# Patient Record
Sex: MALE | Race: WHITE | NOT HISPANIC OR LATINO | Employment: UNEMPLOYED | URBAN - METROPOLITAN AREA
[De-identification: names, ages, dates, MRNs, and addresses within clinical notes are randomized per-mention and may not be internally consistent; named-entity substitution may affect disease eponyms.]

---

## 2018-11-06 ENCOUNTER — APPOINTMENT (EMERGENCY)
Dept: RADIOLOGY | Facility: HOSPITAL | Age: 58
End: 2018-11-06
Payer: COMMERCIAL

## 2018-11-06 ENCOUNTER — HOSPITAL ENCOUNTER (EMERGENCY)
Facility: HOSPITAL | Age: 58
Discharge: HOME | End: 2018-11-06
Attending: EMERGENCY MEDICINE | Admitting: EMERGENCY MEDICINE
Payer: COMMERCIAL

## 2018-11-06 VITALS
HEIGHT: 70 IN | RESPIRATION RATE: 18 BRPM | BODY MASS INDEX: 30.06 KG/M2 | DIASTOLIC BLOOD PRESSURE: 90 MMHG | WEIGHT: 210 LBS | TEMPERATURE: 98.5 F | HEART RATE: 81 BPM | SYSTOLIC BLOOD PRESSURE: 145 MMHG | OXYGEN SATURATION: 98 %

## 2018-11-06 DIAGNOSIS — M10.9 GOUTY ARTHRITIS OF GREAT TOE: Primary | ICD-10-CM

## 2018-11-06 PROCEDURE — 99283 EMERGENCY DEPT VISIT LOW MDM: CPT

## 2018-11-06 PROCEDURE — 73630 X-RAY EXAM OF FOOT: CPT

## 2018-11-06 RX ORDER — IBUPROFEN 600 MG/1
600 TABLET ORAL EVERY 6 HOURS PRN
Qty: 30 TABLET | Refills: 0 | Status: SHIPPED | OUTPATIENT
Start: 2018-11-06 | End: 2018-11-30 | Stop reason: SDUPTHER

## 2018-11-06 RX ADMIN — IBUPROFEN 600 MG: 400 TABLET ORAL at 13:55

## 2018-11-06 NOTE — ED PROVIDER NOTES
History  Chief Complaint   Patient presents with    Foot Pain     According to the patient, his left foot began start swelling and began to become painful in last week  Patient is a 73-year-old male who says he has no significant past medical history says for the past several days the pain at his left 1st MTP  He says it hurts when he walks  He says that his left foot is not swollen  He has had no fevers chills  Patient at this time the pain becoming better but it has not resolved completely and now he is having some mild discomfort in the left ankle so presents emergency department  Patient thinks that he may have gout  None       Past Medical History:   Diagnosis Date    Osteoarthritis        History reviewed  No pertinent surgical history  History reviewed  No pertinent family history  I have reviewed and agree with the history as documented  Social History   Substance Use Topics    Smoking status: Current Every Day Smoker     Packs/day: 3 00     Types: Cigarettes    Smokeless tobacco: Never Used    Alcohol use Yes      Comment: occasionally        Review of Systems   Constitutional: Negative for chills, fatigue, fever and unexpected weight change  HENT: Negative for congestion and nosebleeds  Eyes: Negative for visual disturbance  Respiratory: Negative for chest tightness and shortness of breath  Cardiovascular: Negative for chest pain, palpitations and leg swelling  Gastrointestinal: Negative for abdominal pain, blood in stool, diarrhea, nausea and vomiting  Endocrine: Negative for cold intolerance and heat intolerance  Genitourinary: Negative for difficulty urinating  Musculoskeletal: Negative for arthralgias, back pain, gait problem, joint swelling and myalgias  Skin: Negative for rash  Neurological: Negative for dizziness, speech difficulty, weakness and headaches     Psychiatric/Behavioral: Negative for behavioral problems, confusion, self-injury and suicidal ideas  All other systems reviewed and are negative  Physical Exam  Physical Exam   Constitutional: He is oriented to person, place, and time  He appears well-developed and well-nourished  HENT:   Head: Normocephalic and atraumatic  Nose: Nose normal    Eyes: Pupils are equal, round, and reactive to light  EOM are normal    Neck: Normal range of motion  Neck supple  Cardiovascular: Normal rate, regular rhythm and normal heart sounds  Exam reveals no gallop and no friction rub  No murmur heard  Pulmonary/Chest: Effort normal and breath sounds normal  No respiratory distress  He has no wheezes  He has no rales  Abdominal: Soft  He exhibits no distension  There is no tenderness  There is no rebound and no guarding  Musculoskeletal: Normal range of motion  He exhibits tenderness (Left 1st MTP  It is mildly warm  There is mild erythema)  He exhibits no edema  Neurological: He is alert and oriented to person, place, and time  Skin: Skin is warm and dry  Psychiatric: He has a normal mood and affect  His behavior is normal  Judgment and thought content normal    Nursing note and vitals reviewed  Vital Signs  ED Triage Vitals [11/06/18 1222]   Temperature Pulse Respirations Blood Pressure SpO2   99 4 °F (37 4 °C) 94 18 (!) 178/98 99 %      Temp Source Heart Rate Source Patient Position - Orthostatic VS BP Location FiO2 (%)   Tympanic Monitor Lying Left arm --      Pain Score       8           Vitals:    11/06/18 1222 11/06/18 1349   BP: (!) 178/98 145/90   Pulse: 94 81   Patient Position - Orthostatic VS: Lying Lying       Visual Acuity      ED Medications  Medications   ibuprofen (MOTRIN) tablet 600 mg (not administered)       Diagnostic Studies  Results Reviewed     None                 XR foot 3+ views LEFT   Final Result by Elvira Vang (11/06 1329)    Mild degenerative changes are seen in the first metatarsophalangeal   joint  No acute fracture or subluxation is seen  Signed by Drew Quintero MD                 Procedures  Procedures       Phone Contacts  ED Phone Contact    ED Course  ED Course as of Nov 06 1356   Tue Nov 06, 2018   1339 Patient likely has gout  This exacerbation is at the tail end  Will discharge on a nonsteroidal and follow up with his PMD    1340 XR foot 3+ views LEFT                               [facility]  CritCare Time    Disposition  Final diagnoses:   Gouty arthritis of great toe     Time reflects when diagnosis was documented in both MDM as applicable and the Disposition within this note     Time User Action Codes Description Comment    11/6/2018  1:40 PM Mary Ann Isidra Add [M10 9] Gouty arthritis of great toe       ED Disposition     ED Disposition Condition Comment    Discharge  [address] discharge to home/self care  Condition at discharge: Good        Follow-up Information    None         Patient's Medications   Discharge Prescriptions    IBUPROFEN (MOTRIN) 600 MG TABLET    Take 1 tablet (600 mg total) by mouth every 6 (six) hours as needed for moderate pain       Start Date: 11/6/2018 End Date: --       Order Dose: 600 mg       Quantity: 30 tablet    Refills: 0     No discharge procedures on file      ED Provider  Electronically Signed by           Ihsan Hutchinson MD  11/06/18 3283

## 2018-11-06 NOTE — DISCHARGE INSTRUCTIONS
Gout   WHAT YOU NEED TO KNOW:   Gout is a form of arthritis that causes severe joint pain, redness, swelling, and stiffness  Acute gout pain starts suddenly, gets worse quickly, and stops on its own  Acute gout can become chronic and cause permanent damage to the joints  DISCHARGE INSTRUCTIONS:   Return to the emergency department if:   · You have severe pain in one or more of your joints that you cannot tolerate  · You have a fever or redness that spreads beyond the joint area  Contact your healthcare provider if:   · You have new symptoms, such as a rash, after you start gout treatment  · Your joint pain and swelling do not go away, even after treatment  · You are not urinating as much or as often as you usually do  · You have trouble taking your gout medicines  · You have questions or concerns about your condition or care  Medicines: You may need any of the following:  · Prescription pain medicine  may be given  Ask your healthcare provider how to take this medicine safely  Some prescription pain medicines contain acetaminophen  Do not take other medicines that contain acetaminophen without talking to your healthcare provider  Too much acetaminophen may cause liver damage  Prescription pain medicine may cause constipation  Ask your healthcare provider how to prevent or treat constipation  · NSAIDs , such as ibuprofen, help decrease swelling, pain, and fever  This medicine is available with or without a doctor's order  NSAIDs can cause stomach bleeding or kidney problems in certain people  If you take blood thinner medicine, always ask your healthcare provider if NSAIDs are safe for you  Always read the medicine label and follow directions  · Gout medicine  decreases joint pain and swelling  It may also be given to prevent new gout attacks  · Steroids  reduce inflammation and can help your joint stiffness and pain during gout attacks      · Uric acid medicine  may be given to reduce the amount of uric acid your body makes  Some medicines may help you pass more uric acid when you urinate  · Take your medicine as directed  Contact your healthcare provider if you think your medicine is not helping or if you have side effects  Tell him or her if you are allergic to any medicine  Keep a list of the medicines, vitamins, and herbs you take  Include the amounts, and when and why you take them  Bring the list or the pill bottles to follow-up visits  Carry your medicine list with you in case of an emergency  Follow up with your healthcare provider as directed:  Write down your questions so you remember to ask them during your visits  Manage gout:   · Rest your painful joint so it can heal   Your healthcare provider may recommend crutches or a walker if the affected joint is in a leg  · Apply ice to your joint  Ice decreases pain and swelling  Use an ice pack, or put crushed ice in a plastic bag  Cover the ice pack or bag with a towel before you apply it to your painful joint  Apply ice for 15 to 20 minutes every hour, or as directed  · Elevate your joint  Elevation helps reduce swelling and pain  Raise your joint above the level of your heart as often as you can  Prop your painful joint on pillows to keep it above your heart comfortably  · Go to physical therapy if directed  A physical therapist can teach you exercises to improve flexibility and range of motion  Prevent gout attacks:   · Do not eat high-purine foods  These foods include meats, seafood, asparagus, spinach, cauliflower, and some types of beans  Healthcare providers may tell you to eat more low-fat milk products, such as yogurt  Milk products may decrease your risk for gout attacks  Vitamin C and coffee may also help  Your healthcare provider or dietitian can help you create a meal plan  · Drink more liquids as directed  Liquids such as water help remove uric acid from your body   Ask how much liquid to drink each day and which liquids are best for you  · Manage your weight  Weight loss may decrease the amount of uric acid in your body  Exercise can help you lose weight  Talk to your healthcare provider about the best exercises for you  · Control your blood sugar level if you have diabetes  Keep your blood sugar level in a normal range  This can help prevent gout attacks  · Limit or do not drink alcohol as directed  Alcohol can trigger a gout attack  Alcohol also increases your risk for dehydration  Ask your healthcare provider if alcohol is safe for you  © 2017 [facility] [facility] Information is for End User's use only and may not be sold, redistributed or otherwise used for commercial purposes  All illustrations and images included in CareNotes® are the copyrighted property of A D A M [employer] Inc  or Gerry Estrada  The above information is an  only  It is not intended as medical advice for individual conditions or treatments  Talk to your doctor, nurse or pharmacist before following any medical regimen to see if it is safe and effective for you

## 2018-11-08 ENCOUNTER — OFFICE VISIT (OUTPATIENT)
Dept: FAMILY MEDICINE CLINIC | Facility: CLINIC | Age: 58
End: 2018-11-08
Payer: COMMERCIAL

## 2018-11-08 ENCOUNTER — DOCUMENTATION (OUTPATIENT)
Dept: FAMILY MEDICINE CLINIC | Facility: CLINIC | Age: 58
End: 2018-11-08

## 2018-11-08 VITALS
WEIGHT: 222 LBS | TEMPERATURE: 99.1 F | BODY MASS INDEX: 32.88 KG/M2 | DIASTOLIC BLOOD PRESSURE: 82 MMHG | HEART RATE: 97 BPM | SYSTOLIC BLOOD PRESSURE: 142 MMHG | OXYGEN SATURATION: 98 % | RESPIRATION RATE: 18 BRPM | HEIGHT: 69 IN

## 2018-11-08 DIAGNOSIS — Z00.00 ANNUAL PHYSICAL EXAM: ICD-10-CM

## 2018-11-08 DIAGNOSIS — Z13.220 SCREENING CHOLESTEROL LEVEL: ICD-10-CM

## 2018-11-08 DIAGNOSIS — M10.9 ACUTE GOUT INVOLVING TOE OF LEFT FOOT, UNSPECIFIED CAUSE: Primary | ICD-10-CM

## 2018-11-08 DIAGNOSIS — Z12.5 SCREENING PSA (PROSTATE SPECIFIC ANTIGEN): ICD-10-CM

## 2018-11-08 PROCEDURE — 99203 OFFICE O/P NEW LOW 30 MIN: CPT | Performed by: NURSE PRACTITIONER

## 2018-11-08 PROCEDURE — 99386 PREV VISIT NEW AGE 40-64: CPT | Performed by: NURSE PRACTITIONER

## 2018-11-08 PROCEDURE — 3008F BODY MASS INDEX DOCD: CPT | Performed by: NURSE PRACTITIONER

## 2018-11-08 RX ORDER — PREDNISONE 20 MG/1
20 TABLET ORAL 2 TIMES DAILY WITH MEALS
Qty: 10 TABLET | Refills: 0 | Status: SHIPPED | OUTPATIENT
Start: 2018-11-08 | End: 2018-11-13

## 2018-11-08 RX ORDER — IBUPROFEN 600 MG/1
600 TABLET ORAL EVERY 6 HOURS PRN
Qty: 90 TABLET | Refills: 1 | Status: SHIPPED | OUTPATIENT
Start: 2018-11-08 | End: 2019-05-07 | Stop reason: SDUPTHER

## 2018-11-08 NOTE — PROGRESS NOTES
HPI:  Ron Skaggs is a 63 y o  male here for his yearly health maintenance exam    There is no problem list on file for this patient  Past Medical History:   Diagnosis Date    Gout     Osteoarthritis                 Current Outpatient Prescriptions   Medication Sig Dispense Refill    ibuprofen (MOTRIN) 600 mg tablet Take 1 tablet (600 mg total) by mouth every 6 (six) hours as needed for moderate pain 30 tablet 0    ibuprofen (MOTRIN) 600 mg tablet Take 1 tablet (600 mg total) by mouth every 6 (six) hours as needed for mild pain or moderate pain 90 tablet 1    predniSONE 20 mg tablet Take 1 tablet (20 mg total) by mouth 2 (two) times a day with meals for 5 days 10 tablet 0     No current facility-administered medications for this visit  No Known Allergies    There is no immunization history on file for this patient  Patient Care Team:  Griffin Canavan as PCP - General (Family Medicine)    Review of Systems   Constitutional: Negative for activity change, diaphoresis, fatigue and fever  HENT: Negative for congestion, facial swelling, hearing loss, rhinorrhea, sinus pain, sinus pressure, sneezing, sore throat and voice change  Eyes: Negative for discharge and visual disturbance  Respiratory: Negative for cough, choking, chest tightness, shortness of breath, wheezing and stridor  Cardiovascular: Negative for chest pain, palpitations and leg swelling  Gastrointestinal: Negative for abdominal distention, abdominal pain, constipation, diarrhea, nausea and vomiting  Endocrine: Negative for polydipsia, polyphagia and polyuria  Genitourinary: Negative for difficulty urinating, dysuria, frequency and urgency  Musculoskeletal: Positive for arthralgias (see acute note- gout left great toe), gait problem and joint swelling  Negative for back pain, myalgias, neck pain and neck stiffness  Skin: Negative for color change, rash and wound     Neurological: Negative for dizziness, syncope, speech difficulty, weakness, light-headedness and headaches  Hematological: Negative for adenopathy  Does not bruise/bleed easily  Psychiatric/Behavioral: Negative for agitation, behavioral problems, confusion, hallucinations, sleep disturbance and suicidal ideas  The patient is not nervous/anxious  Physical Exam :  Physical Exam   Constitutional: He is oriented to person, place, and time  He appears well-developed and well-nourished  No distress  HENT:   Head: Normocephalic and atraumatic  Right Ear: Tympanic membrane, external ear and ear canal normal    Left Ear: Tympanic membrane, external ear and ear canal normal    Nose: Nose normal  Right sinus exhibits no maxillary sinus tenderness and no frontal sinus tenderness  Left sinus exhibits no maxillary sinus tenderness and no frontal sinus tenderness  Mouth/Throat: Uvula is midline, oropharynx is clear and moist and mucous membranes are normal  No oropharyngeal exudate  Eyes: Pupils are equal, round, and reactive to light  Conjunctivae and EOM are normal  Right eye exhibits no discharge  Left eye exhibits no discharge  Cardiovascular: Normal rate, regular rhythm and normal heart sounds  Exam reveals no gallop and no friction rub  No murmur heard  Pulmonary/Chest: Effort normal  No respiratory distress  He has wheezes (throughout)  He has no rales  Abdominal: Soft  Bowel sounds are normal  He exhibits distension (round, large abd)  Musculoskeletal: Normal range of motion  He exhibits tenderness (left foot/great toe- see acute note)  He exhibits no edema or deformity  Neurological: He is alert and oriented to person, place, and time  No cranial nerve deficit  Coordination normal    Skin: Skin is warm, dry and intact  No rash noted  He is not diaphoretic  No erythema  Psychiatric: He has a normal mood and affect   His speech is normal and behavior is normal  Judgment and thought content normal  Cognition and memory are normal    Nursing note and vitals reviewed  Reviewed Updated [facility] Prior Wellness Visits:   Last Health Maintenance visit information was reviewed, patient interviewed , no change since last HM visit yes  Last HM visit information was reviewed, patient interviewed and updates made to the record today yes    Assessment and Plan:  1  Annual physical exam     2  Acute gout involving toe of left foot, unspecified cause  predniSONE 20 mg tablet    ibuprofen (MOTRIN) 600 mg tablet    Comprehensive metabolic panel    Uric acid   3  Screening cholesterol level  Lipid panel   4  Screening PSA (prostate specific antigen)  PSA Total, Diagnostic       There are no preventive care reminders to display for this patient

## 2018-11-08 NOTE — PROGRESS NOTES
[address] [facility]        NAME: Mini Ferguson is a 63 y o  male  : 1960    MRN: [id number]  DATE: 2018  TIME: 10:45 AM    Assessment and Plan   Acute gout involving toe of left foot, unspecified cause [M10 9]  1  Acute gout involving toe of left foot, unspecified cause  predniSONE 20 mg tablet    ibuprofen (MOTRIN) 600 mg tablet    Comprehensive metabolic panel    Uric acid   2  Screening cholesterol level  Lipid panel   3  Annual physical exam     4  Screening PSA (prostate specific antigen)  PSA Total, Diagnostic         Patient Instructions     Patient Instructions   Start Prednisone as directed, continue Ibuprofen  Get labs drawn in 3 weeks from now- after 18- fast for 8-12 hours  If any problems, call or return           Chief Complaint     Chief Complaint   Patient presents with    Establish Care     Follow up ER Gout Right foot great toe         History of Present Illness       Patient is here today as a f/u from ED for gout attack  Has had this multiple times over the past few years  Has not seen a doctor in "years"  Is a heavy smoker and admits he hates coming to the doctor  Pt  Does agree to go for routine screening labs if he only has to go once  Review of Systems   Review of Systems   Constitutional: Negative for activity change, diaphoresis, fatigue and fever  HENT: Negative for congestion, facial swelling, hearing loss, rhinorrhea, sinus pain, sinus pressure, sneezing, sore throat and voice change  Eyes: Negative for discharge and visual disturbance  Respiratory: Negative for cough, choking, chest tightness, shortness of breath, wheezing and stridor  Cardiovascular: Negative for chest pain, palpitations and leg swelling  Gastrointestinal: Negative for abdominal distention, abdominal pain, constipation, diarrhea, nausea and vomiting  Endocrine: Negative for polydipsia, polyphagia and polyuria     Genitourinary: Negative for difficulty urinating, dysuria, frequency and urgency  Musculoskeletal: Positive for arthralgias (left ankle/foot/great toe), gait problem and joint swelling  Negative for back pain, myalgias, neck pain and neck stiffness  Skin: Negative for color change, rash and wound  Neurological: Negative for dizziness, syncope, speech difficulty, weakness, light-headedness and headaches  Hematological: Negative for adenopathy  Does not bruise/bleed easily  Psychiatric/Behavioral: Negative for agitation, behavioral problems, confusion, hallucinations, sleep disturbance and suicidal ideas  The patient is not nervous/anxious  Current Medications       Current Outpatient Prescriptions:     ibuprofen (MOTRIN) 600 mg tablet, Take 1 tablet (600 mg total) by mouth every 6 (six) hours as needed for moderate pain, Disp: 30 tablet, Rfl: 0    ibuprofen (MOTRIN) 600 mg tablet, Take 1 tablet (600 mg total) by mouth every 6 (six) hours as needed for mild pain or moderate pain, Disp: 90 tablet, Rfl: 1    predniSONE 20 mg tablet, Take 1 tablet (20 mg total) by mouth 2 (two) times a day with meals for 5 days, Disp: 10 tablet, Rfl: 0    Current Allergies     Allergies as of 11/08/2018    (No Known Allergies)            The following portions of the patient's history were reviewed and updated as appropriate: allergies, current medications, past family history, past medical history, past social history, past surgical history and problem list      Past Medical History:   Diagnosis Date    Gout     Osteoarthritis        History reviewed  No pertinent surgical history  Family History   Problem Relation Age of Onset    Arthritis Mother     Coronary artery disease Father          Medications have been verified          Objective   /82   Pulse 97   Temp 99 1 °F (37 3 °C) (Tympanic)   Resp 18   Ht 5' 9" (1 753 m)   Wt 101 kg (222 lb)   SpO2 98%   BMI 32 78 kg/m²        Physical Exam     Physical Exam   Constitutional: He is oriented to person, place, and time  He appears well-developed and well-nourished  No distress  HENT:   Head: Normocephalic and atraumatic  Right Ear: Tympanic membrane, external ear and ear canal normal    Left Ear: Tympanic membrane, external ear and ear canal normal    Nose: Nose normal  Right sinus exhibits no maxillary sinus tenderness and no frontal sinus tenderness  Left sinus exhibits no maxillary sinus tenderness and no frontal sinus tenderness  Mouth/Throat: Uvula is midline, oropharynx is clear and moist and mucous membranes are normal  No oropharyngeal exudate  Eyes: Pupils are equal, round, and reactive to light  Conjunctivae and EOM are normal  Right eye exhibits no discharge  Left eye exhibits no discharge  Cardiovascular: Normal rate, regular rhythm and normal heart sounds  Exam reveals no gallop and no friction rub  No murmur heard  Pulmonary/Chest: Effort normal  No respiratory distress  He has wheezes (throughout)  He has no rales  Abdominal: He exhibits distension (large round abdomen)  Musculoskeletal: Normal range of motion  He exhibits tenderness  He exhibits no edema or deformity  Left foot: There is bony tenderness and swelling (bounding pedal pulse left foot, redness of left great toe joint)  There is normal range of motion, no deformity and no laceration  Neurological: He is alert and oriented to person, place, and time  No cranial nerve deficit  Coordination normal    Skin: Skin is warm, dry and intact  No rash noted  He is not diaphoretic  No erythema  Psychiatric: He has a normal mood and affect  His speech is normal and behavior is normal  Judgment and thought content normal  Cognition and memory are normal    Nursing note and vitals reviewed

## 2018-11-08 NOTE — PATIENT INSTRUCTIONS
Start Prednisone as directed, continue Ibuprofen  Get labs drawn in 3 weeks from now- after 11/29/18- fast for 8-12 hours  If any problems, call or return

## 2018-11-23 ENCOUNTER — TRANSCRIBE ORDERS (OUTPATIENT)
Dept: ADMINISTRATIVE | Facility: HOSPITAL | Age: 58
End: 2018-11-23

## 2018-11-23 ENCOUNTER — LAB (OUTPATIENT)
Dept: LAB | Facility: HOSPITAL | Age: 58
End: 2018-11-23
Payer: COMMERCIAL

## 2018-11-23 DIAGNOSIS — M10.9 ACUTE GOUT INVOLVING TOE OF LEFT FOOT, UNSPECIFIED CAUSE: ICD-10-CM

## 2018-11-23 DIAGNOSIS — Z13.220 SCREENING CHOLESTEROL LEVEL: ICD-10-CM

## 2018-11-23 DIAGNOSIS — Z12.5 SCREENING PSA (PROSTATE SPECIFIC ANTIGEN): ICD-10-CM

## 2018-11-23 LAB
ALBUMIN SERPL BCP-MCNC: 4.4 G/DL (ref 3.5–5.7)
ALP SERPL-CCNC: 70 U/L (ref 40–150)
ALT SERPL W P-5'-P-CCNC: 17 U/L (ref 7–52)
ANION GAP SERPL CALCULATED.3IONS-SCNC: 6 MMOL/L (ref 4–13)
AST SERPL W P-5'-P-CCNC: 13 U/L (ref 13–39)
BILIRUB SERPL-MCNC: 0.3 MG/DL (ref 0.2–1)
BUN SERPL-MCNC: 19 MG/DL (ref 7–25)
CALCIUM SERPL-MCNC: 9.4 MG/DL (ref 8.6–10.5)
CHLORIDE SERPL-SCNC: 104 MMOL/L (ref 98–107)
CHOLEST SERPL-MCNC: 232 MG/DL (ref 0–200)
CO2 SERPL-SCNC: 25 MMOL/L (ref 21–31)
CREAT SERPL-MCNC: 1.11 MG/DL (ref 0.7–1.3)
GFR SERPL CREATININE-BSD FRML MDRD: 73 ML/MIN/1.73SQ M
GLUCOSE P FAST SERPL-MCNC: 97 MG/DL (ref 65–99)
HDLC SERPL-MCNC: 46 MG/DL (ref 40–60)
LDLC SERPL CALC-MCNC: 153 MG/DL (ref 0–100)
NONHDLC SERPL-MCNC: 186 MG/DL
POTASSIUM SERPL-SCNC: 4.3 MMOL/L (ref 3.5–5.5)
PROT SERPL-MCNC: 8 G/DL (ref 6.4–8.9)
PSA SERPL-MCNC: 0.6 NG/ML (ref 0–4)
SODIUM SERPL-SCNC: 135 MMOL/L (ref 134–143)
TRIGL SERPL-MCNC: 163 MG/DL (ref 44–166)
URATE SERPL-MCNC: 6.7 MG/DL (ref 2.3–7.6)

## 2018-11-23 PROCEDURE — 84550 ASSAY OF BLOOD/URIC ACID: CPT

## 2018-11-23 PROCEDURE — 84153 ASSAY OF PSA TOTAL: CPT

## 2018-11-23 PROCEDURE — 36415 COLL VENOUS BLD VENIPUNCTURE: CPT

## 2018-11-23 PROCEDURE — 80053 COMPREHEN METABOLIC PANEL: CPT

## 2018-11-23 PROCEDURE — 80061 LIPID PANEL: CPT

## 2018-11-30 ENCOUNTER — OFFICE VISIT (OUTPATIENT)
Dept: FAMILY MEDICINE CLINIC | Facility: CLINIC | Age: 58
End: 2018-11-30
Payer: COMMERCIAL

## 2018-11-30 VITALS
DIASTOLIC BLOOD PRESSURE: 78 MMHG | WEIGHT: 217 LBS | HEART RATE: 69 BPM | TEMPERATURE: 99.6 F | BODY MASS INDEX: 31.07 KG/M2 | SYSTOLIC BLOOD PRESSURE: 138 MMHG | RESPIRATION RATE: 20 BRPM | HEIGHT: 70 IN | OXYGEN SATURATION: 98 %

## 2018-11-30 DIAGNOSIS — Z12.11 COLON CANCER SCREENING: ICD-10-CM

## 2018-11-30 DIAGNOSIS — Z00.00 PREVENTATIVE HEALTH CARE: ICD-10-CM

## 2018-11-30 DIAGNOSIS — M1A.9XX0 CHRONIC GOUT INVOLVING TOE OF LEFT FOOT WITHOUT TOPHUS, UNSPECIFIED CAUSE: Primary | ICD-10-CM

## 2018-11-30 PROCEDURE — 3008F BODY MASS INDEX DOCD: CPT | Performed by: NURSE PRACTITIONER

## 2018-11-30 PROCEDURE — 99213 OFFICE O/P EST LOW 20 MIN: CPT | Performed by: NURSE PRACTITIONER

## 2018-11-30 RX ORDER — ALLOPURINOL 100 MG/1
100 TABLET ORAL DAILY
Qty: 90 TABLET | Refills: 0 | Status: SHIPPED | OUTPATIENT
Start: 2018-11-30 | End: 2019-01-07 | Stop reason: ALTCHOICE

## 2018-11-30 NOTE — PATIENT INSTRUCTIONS
Start Allopurinol as directed  Return to office in 3 months for [facility]  Encouraged Colonoscopy- pt  declines  Call or return for problems/concerns

## 2018-11-30 NOTE — PROGRESS NOTES
[address] [facility]        NAME: Ericka Mosher is a 63 y o  male  : 1960    MRN: [id number]  DATE: 2018  TIME: 3:22 PM    Assessment and Plan   Chronic gout involving toe of left foot without tophus, unspecified cause [M1A 9XX0]  1  Chronic gout involving toe of left foot without tophus, unspecified cause  allopurinol (ZYLOPRIM) 100 mg tablet   2  Colon cancer screening  Ambulatory referral to Gastroenterology   3  Preventative health care  Ambulatory referral to Gastroenterology         Patient Instructions     Patient Instructions   Start Allopurinol as directed  Return to office in 3 months for [facility]  Encouraged Colonoscopy- pt  declines  Call or return for problems/concerns          Chief Complaint     Chief Complaint   Patient presents with    Gout     Left foot and leg         History of Present Illness       Patient here to review recent labs and follow-up on left foot gout attack  Uric acid level high normal level  Foot pain and swelling improved since last visit  Patient not interested in colonoscopy screening at this time  Pt  Reports he has not had alcohol since his most recent gout attack  Reports his grandfather had many gout attacks        Review of Systems   Review of Systems   Constitutional: Negative for activity change, diaphoresis, fatigue and fever  HENT: Negative for congestion, facial swelling, hearing loss, rhinorrhea, sinus pain, sinus pressure, sneezing, sore throat and voice change  Eyes: Negative for discharge and visual disturbance  Respiratory: Negative for cough, choking, chest tightness, shortness of breath, wheezing and stridor  Cardiovascular: Negative for chest pain, palpitations and leg swelling  Gastrointestinal: Negative for abdominal distention, abdominal pain, constipation, diarrhea, nausea and vomiting  Endocrine: Negative for polydipsia, polyphagia and polyuria     Genitourinary: Negative for difficulty urinating, dysuria, frequency and urgency  Musculoskeletal: Negative for arthralgias, back pain, gait problem, joint swelling, myalgias, neck pain and neck stiffness  See hpi   Skin: Negative for color change, rash and wound  Neurological: Negative for dizziness, syncope, speech difficulty, weakness, light-headedness and headaches  Hematological: Negative for adenopathy  Does not bruise/bleed easily  Psychiatric/Behavioral: Negative for agitation, behavioral problems, confusion, hallucinations, sleep disturbance and suicidal ideas  The patient is not nervous/anxious  Current Medications       Current Outpatient Prescriptions:     ibuprofen (MOTRIN) 600 mg tablet, Take 1 tablet (600 mg total) by mouth every 6 (six) hours as needed for mild pain or moderate pain, Disp: 90 tablet, Rfl: 1    allopurinol (ZYLOPRIM) 100 mg tablet, Take 1 tablet (100 mg total) by mouth daily, Disp: 90 tablet, Rfl: 0    Current Allergies     Allergies as of 11/30/2018    (No Known Allergies)            The following portions of the patient's history were reviewed and updated as appropriate: allergies, current medications, past family history, past medical history, past social history, past surgical history and problem list      Past Medical History:   Diagnosis Date    Gout     Osteoarthritis        History reviewed  No pertinent surgical history  Family History   Problem Relation Age of Onset    Arthritis Mother     Coronary artery disease Father          Medications have been verified  Objective   /78   Pulse 69   Temp 99 6 °F (37 6 °C) (Tympanic)   Resp 20   Ht 5' 10" (1 778 m)   Wt 98 4 kg (217 lb)   SpO2 98%   BMI 31 14 kg/m²        Physical Exam     Physical Exam   Constitutional: He is oriented to person, place, and time  He appears well-developed and well-nourished  No distress  Cardiovascular: Normal rate, regular rhythm and normal heart sounds  No murmur heard    Pulmonary/Chest: Effort normal and breath sounds normal  No respiratory distress  He has no wheezes  Musculoskeletal: Normal range of motion  Feet:    Neurological: He is alert and oriented to person, place, and time  Skin: Skin is warm and dry  He is not diaphoretic  Psychiatric: He has a normal mood and affect  His behavior is normal  Judgment and thought content normal    Nursing note and vitals reviewed

## 2018-12-26 ENCOUNTER — HOSPITAL ENCOUNTER (EMERGENCY)
Facility: HOSPITAL | Age: 58
Discharge: HOME | End: 2018-12-26
Attending: EMERGENCY MEDICINE | Admitting: EMERGENCY MEDICINE
Payer: COMMERCIAL

## 2018-12-26 ENCOUNTER — APPOINTMENT (EMERGENCY)
Dept: RADIOLOGY | Facility: HOSPITAL | Age: 58
End: 2018-12-26
Payer: COMMERCIAL

## 2018-12-26 VITALS
OXYGEN SATURATION: 97 % | BODY MASS INDEX: 30.06 KG/M2 | RESPIRATION RATE: 17 BRPM | HEIGHT: 70 IN | HEART RATE: 91 BPM | TEMPERATURE: 98.5 F | WEIGHT: 210 LBS | SYSTOLIC BLOOD PRESSURE: 160 MMHG | DIASTOLIC BLOOD PRESSURE: 90 MMHG

## 2018-12-26 DIAGNOSIS — M25.462 KNEE EFFUSION, LEFT: ICD-10-CM

## 2018-12-26 DIAGNOSIS — M17.12 DEGENERATIVE JOINT DISEASE OF LEFT KNEE: Primary | ICD-10-CM

## 2018-12-26 PROCEDURE — 73564 X-RAY EXAM KNEE 4 OR MORE: CPT

## 2018-12-26 PROCEDURE — 99283 EMERGENCY DEPT VISIT LOW MDM: CPT

## 2018-12-26 NOTE — ED PROVIDER NOTES
History  Chief Complaint   Patient presents with    Knee Pain     No injury reported    Knee Swelling     Patient reports to the emergency department with complaint of left knee pain and swelling  Patient awoke with left knee pain and swelling 2 days ago in the morning and has had pain getting worse and swelling getting worse since  Patient has no increasing pain with weight-bearing of the left knee however the patient attempts flexion of the left knee she has pain to the posterior making  Patient cannot remember any injury  Patient does have gout and arthritis diagnosed in his left ankle recently  History provided by:  Patient      Prior to Admission Medications   Prescriptions Last Dose Informant Patient Reported? Taking?   allopurinol (ZYLOPRIM) 100 mg tablet   No No   Sig: Take 1 tablet (100 mg total) by mouth daily   ibuprofen (MOTRIN) 600 mg tablet   No No   Sig: Take 1 tablet (600 mg total) by mouth every 6 (six) hours as needed for mild pain or moderate pain      Facility-Administered Medications: None       Past Medical History:   Diagnosis Date    Gout     Osteoarthritis        History reviewed  No pertinent surgical history  Family History   Problem Relation Age of Onset    Arthritis Mother     Coronary artery disease Father      I have reviewed and agree with the history as documented  Social History   Substance Use Topics    Smoking status: Current Every Day Smoker     Packs/day: 3 00     Years: 15 00     Types: Cigarettes    Smokeless tobacco: Never Used    Alcohol use Yes      Comment: occasionally        Review of Systems   Constitutional: Negative for chills and fever  HENT: Negative for rhinorrhea and sore throat  Eyes: Negative for visual disturbance  Respiratory: Negative for cough and shortness of breath  Cardiovascular: Negative for chest pain and leg swelling  Gastrointestinal: Negative for abdominal pain, diarrhea, nausea and vomiting     Genitourinary: Negative for dysuria  Musculoskeletal: Positive for arthralgias and gait problem  Negative for back pain and myalgias  Left knee pain, worse with range of motion   Skin: Negative for rash  Neurological: Negative for dizziness and headaches  Psychiatric/Behavioral: Negative for confusion  All other systems reviewed and are negative  Physical Exam  Physical Exam   Constitutional: He is oriented to person, place, and time  He appears well-developed and well-nourished  HENT:   Nose: Nose normal    Mouth/Throat: Oropharynx is clear and moist  No oropharyngeal exudate  Eyes: Pupils are equal, round, and reactive to light  Conjunctivae and EOM are normal  No scleral icterus  Neck: Normal range of motion  Neck supple  No JVD present  No tracheal deviation present  Pulmonary/Chest: Effort normal  No respiratory distress  Musculoskeletal: He exhibits edema  He exhibits no tenderness or deformity  Limited range of motion of the left knee due to pain with any attempted flexion to the posterior knee  There is edema of the left knee anteriorly but no erythema and no tenderness to the anterior knee  There is no tenderness to the posterior knee with palpation only with flexion  Left hip and left ankle on the foot are unaffected  Neurological: He is alert and oriented to person, place, and time  No cranial nerve deficit or sensory deficit  He exhibits normal muscle tone  5/5 motor, nl sens   Skin: Skin is warm and dry  No rash noted  No erythema  Psychiatric: He has a normal mood and affect  His behavior is normal    Nursing note and vitals reviewed        Vital Signs  ED Triage Vitals [12/26/18 1044]   Temperature Pulse Respirations Blood Pressure SpO2   98 5 °F (36 9 °C) 91 17 160/90 97 %      Temp Source Heart Rate Source Patient Position - Orthostatic VS BP Location FiO2 (%)   Tympanic Monitor Sitting Left arm --      Pain Score       7           Vitals:    12/26/18 1044   BP: 160/90 Pulse: 91   Patient Position - Orthostatic VS: Sitting       Visual Acuity      ED Medications  Medications - No data to display    Diagnostic Studies  Results Reviewed     None                 XR knee 4+ vw left injury   Final Result by Denisse Watts (12/26 1211)   Joint effusion  Mild DJD behind the patella  Signed by Chacho Yoo MD                 Procedures  Procedures       Phone Contacts  ED Phone Contact    ED Course  ED Course as of Dec 26 1438   Wed Dec 26, 2018   1206 Four views of the left knee reviewed by me  We await radiologist's reading for disposition  MDM  CritCare Time    Disposition  Final diagnoses:   Degenerative joint disease of left knee   Knee effusion, left     Time reflects when diagnosis was documented in both MDM as applicable and the Disposition within this note     Time User Action Codes Description Comment    12/26/2018 12:18 PM Marilyn Pouch Add [M17 12] Degenerative joint disease of left knee     12/26/2018 12:18 PM [place] Pouch Add [M25 462] Knee effusion, left       ED Disposition     ED Disposition Condition Comment    Discharge  Aspen Tim discharge to home/self care  Condition at discharge: Stable        Follow-up Information     Follow up With Specialties Details Why [address], [address] [place] [address], Nurse Practitioner, Emergency Medicine In 3 days  [address] [facility] [address] [phone]            Discharge Medication List as of 12/26/2018 12:19 PM      CONTINUE these medications which have NOT CHANGED    Details   allopurinol (ZYLOPRIM) 100 mg tablet Take 1 tablet (100 mg total) by mouth daily, Starting Fri 11/30/2018, Normal      ibuprofen (MOTRIN) 600 mg tablet Take 1 tablet (600 mg total) by mouth every 6 (six) hours as needed for mild pain or moderate pain, Starting Thu 11/8/2018, Normal           No discharge procedures on file      ED Provider  Electronically Signed by           Donovan Boeck, DO  12/26/18 1430

## 2018-12-26 NOTE — DISCHARGE INSTRUCTIONS
Osteoarthritis   WHAT YOU NEED TO KNOW:   What is osteoarthritis? Osteoarthritis (OA) occurs when cartilage (tissue that cushions a joint) wears away slowly and causes the bones to rub together  OA is a long-term condition that often affects the hands, neck, lower back, knees, and hips  OA is also called arthrosis or degenerative joint disease  What increases my risk for OA? · Age older than 48 years    · A family history of OA    · Obesity, high blood pressure, or high blood sugar    · A joint injury or infection    · Repetitive movements of your joints at work or during sports  What are the signs and symptoms of OA? · Joint pain that gets worse when you move the joint     · Joint stiffness that decreases after you move the joint     · Decreased range of movement     · Hard, bony enlargement on your fingers or toes    · A grinding or cracking sound when you move your joint  How is OA diagnosed? X-rays are pictures of the bones in your joint  Contrast liquid may be injected into your joint before the x-ray  The liquid will help your joint show up better on the x-ray  Tell the healthcare provider if you have ever had an allergic reaction to contrast liquid  How is OA treated? The goals of treatment are to decrease pain, increase strength, and improve movement  You may need any of the following:  · Medicines:      ¨ Acetaminophen  is used to decrease pain  It is available without a doctor's order  Ask how much to take and how often to take it  Follow directions  Acetaminophen can cause liver damage if not taken correctly  ¨ NSAIDs , such as ibuprofen, help decrease swelling, pain, and fever  This medicine is available with or without a doctor's order  NSAIDs can cause stomach bleeding or kidney problems in certain people  If you take blood thinner medicine, always ask your healthcare provider if NSAIDs are safe for you  Always read the medicine label and follow directions      ¨ Capsaicin cream  may help decrease pain in your joint  ¨ Prescription pain medicine  may be given to decrease severe pain if other medicines do not work  Take the medicine as directed  Do not wait until the pain is severe before you take your medicine  ¨ A steroid injection  may be given if your symptoms get worse  · Physical therapy  may be used to teach you exercises to help improve movement and strength, and to decrease pain  · Surgery  may be needed if other treatments do not work  How can I manage my symptoms? · Stay active  Physical activity may reduce your pain and improve your ability to do daily activities  Avoid activities that cause pain  Ask your healthcare provider what type of exercise would be best for you  · Maintain a healthy weight  This helps decrease the strain on the joints in your back, hips, knees, ankles, and feet  Ask your healthcare provider how much you should weigh  Ask him to help you create a weight loss plan if you are overweight  · Use heat or ice  on your joints as directed  Heat and ice help decrease pain, swelling, and muscle spasms  Use a heating pad on a low setting or take a warm bath  Use an ice pack, or put crushed ice in a plastic bag  Cover it with a towel  · Massage  the muscles around the joint to relieve pain and stiffness  · Use a cane, crutches, or a walker  to protect and relieve pressure on your ankle, knee, and hip joints  You may also be prescribed shoe inserts to decrease pressure in your joints  · Wear flat or low-heeled shoes  This will help decrease pain and reduce pressure on your ankle, knee, and hip joints  When should I seek immediate care? · You have severe pain  · You cannot move your joint  When should I contact my healthcare provider? · You have a fever  · Your joint is red and tender  · You have questions or concerns about your condition or care  CARE AGREEMENT:   You have the right to help plan your care   Learn about your health condition and how it may be treated  Discuss treatment options with your caregivers to decide what care you want to receive  You always have the right to refuse treatment  The above information is an  only  It is not intended as medical advice for individual conditions or treatments  Talk to your doctor, nurse or pharmacist before following any medical regimen to see if it is safe and effective for you  © 2017 [employer] Information is for End User's use only and may not be sold, redistributed or otherwise used for commercial purposes  All illustrations and images included in CareNotes® are the copyrighted property of [employer]  or [employer]  Knee Immobilizer   WHAT YOU NEED TO KNOW:   A knee immobilizer limits knee movement  It is used after an injury or surgery to help your knee, muscles, or tendons heal    DISCHARGE INSTRUCTIONS:   How to safely use a knee immobilizer:   · Have your knee immobilizer fitted by your healthcare provider  It is important that your knee immobilizer is the right size for you and that it fits properly  · Wear your knee immobilizer as directed  It can be worn over your clothing  Check the fit of the knee immobilizer often  If it does not fit properly or slips out of place, it could cause further injury  · Use crutches as directed  You may need to avoid putting weight on your injured leg  Your healthcare provider will tell you if you need crutches and for how long  · Inspect your knee immobilizer often  Do not wear your knee immobilizer if it is damaged or broken  You may need to replace it if it becomes worn  · Ask your healthcare provider how to care for your knee immobilizer  You may be able to hand wash the fabric with mild soap and water  Do not place it in the washer or dryer  · Go to physical therapy as directed    A physical therapist can help you strengthen the muscles in your leg and help your knee heal   Contact your healthcare provider if:   · Your knee pain becomes worse when you wear your knee immobilizer  · Your skin is sore or raw after you wear your knee immobilizer  · Your leg feels numb or swells while you wear your knee immobilizer  · Your knee immobilizer is damaged  · You have questions or concerns about your condition or care  Return to the emergency department if:   · You have severe swelling or pain in your leg or knee  © 2017 [employer]  Information is for End User's use only and may not be sold, redistributed or otherwise used for commercial purposes  All illustrations and images included in CareNotes® are the copyrighted property of [employer] M [employer] Inc  or Gerry [employer]  The above information is an  only  It is not intended as medical advice for individual conditions or treatments  Talk to your doctor, nurse or pharmacist before following any medical regimen to see if it is safe and effective for you  Crutch Instructions   WHAT YOU NEED TO KNOW:   Crutches are tools that provide support and balance when you walk  You may need 1 or 2 crutches to help support your body weight  You may need crutches if you had surgery or an injury that affects your ability to walk  DISCHARGE INSTRUCTIONS:   How to use crutches safely:   · Support your weight with your arms and hands  Do not support your weight with your armpits  This could hurt the nerves that are in your underarms  Keep your elbow bent when the crutch is in place under your arm  · Walk slowly and carefully with crutches  Go up and down stairs and ramps slowly, and stop to rest when you feel tired  Get up slowly to a sitting or standing position  This will help prevent dizziness and fainting  Use your crutches only on firm ground  Use caution when you walk on ice or snow  Wet or waxed floors and smooth cement floors can be slippery   Watch out for small rugs or cords   How to walk with crutches:   · Place both crutches under your arms, and place your hands on the hand  of the crutches  Place your crutches slightly in front of you  · The top of the crutches should be about 2 fingers [id number] (about 1½ inches) below your armpits  Place your weight on your hands  The top of the crutches should not press into your armpits  · If you have one leg that is injured, keep it off the floor by bending your knee  · Lift the crutches and move them a step ahead of you  Put the rubber ends of the crutches firmly on the ground  Move the foot that is not injured between the crutches  Place that heel down first     · If you are using your crutches for balance, move your right foot and left crutch forward  Then move your left foot and right crutch forward  Keep walking this way  How to go up stairs with crutches:   · Face the stairs  Put the crutches close to the first step  · Push onto the crutches and put your uninjured leg on the first step  · Put your weight on your uninjured leg that is on the first step  Bring both crutches and the injured leg onto the step at the same time  · When you hold onto a railing with one arm, put both crutches under the other arm  Use the railing to help you go up stairs  How to go down stairs with crutches:   · Stand with the toes of your uninjured leg close to the edge of the step  · Bend the knee of your uninjured leg  Slowly lower both crutches along with the injured leg onto the next step  · Lean on your crutches  Slowly lower your uninjured leg onto the same step  · Place both crutches under one arm while you hold onto the railing with the other arm  How to sit in a chair with crutches:   · Turn and back up to the chair until you feel the edge of it against the back of your legs  Keep your injured leg forward  · Take your crutches out from under your arms   Sit while bending your uninjured knee        How to get up from a chair with crutches:   · Sit on the edge of your chair  · Push up with your hands using the crutches or arms of the chair  Put your weight on your uninjured foot as you get up  · Keep your injured leg bent at the knee and off the floor  Contact your healthcare provider if:   · Your crutches do not fit  · One crutch is longer than the other  · Your crutches break or get lost     · The rubber tips of your crutches are split or loose  · You get blisters or painful calluses on your hands or armpits  · Your armpit is red, sore, or has bumps or pimples  · Your arm muscles get weaker the longer you use the crutches  · You have questions or concerns about your condition or care  Return to the emergency department if:   · You have sudden numbness in a hand or arm  · Your fingers feel cold or have cramping pain  © 2017 [address] [facility] Information is for End User's use only and may not be sold, redistributed or otherwise used for commercial purposes  All illustrations and images included in CareNotes® are the copyrighted property of [employer]  or Gerry Estrada  The above information is an  only  It is not intended as medical advice for individual conditions or treatments  Talk to your doctor, nurse or pharmacist before following any medical regimen to see if it is safe and effective for you

## 2018-12-28 ENCOUNTER — OFFICE VISIT (OUTPATIENT)
Dept: FAMILY MEDICINE CLINIC | Facility: CLINIC | Age: 58
End: 2018-12-28
Payer: COMMERCIAL

## 2018-12-28 VITALS
RESPIRATION RATE: 18 BRPM | DIASTOLIC BLOOD PRESSURE: 84 MMHG | HEIGHT: 70 IN | OXYGEN SATURATION: 98 % | SYSTOLIC BLOOD PRESSURE: 138 MMHG | WEIGHT: 217 LBS | BODY MASS INDEX: 31.07 KG/M2 | HEART RATE: 75 BPM | TEMPERATURE: 99 F

## 2018-12-28 DIAGNOSIS — M25.462 EFFUSION OF BURSA OF LEFT KNEE: Primary | ICD-10-CM

## 2018-12-28 DIAGNOSIS — E66.09 CLASS 1 OBESITY DUE TO EXCESS CALORIES WITHOUT SERIOUS COMORBIDITY WITH BODY MASS INDEX (BMI) OF 31.0 TO 31.9 IN ADULT: ICD-10-CM

## 2018-12-28 PROCEDURE — 99213 OFFICE O/P EST LOW 20 MIN: CPT | Performed by: NURSE PRACTITIONER

## 2018-12-28 NOTE — PROGRESS NOTES
[facility]        NAME: Gadiel Jeff is a 63 y o  male  : 1960    MRN: [id number]  DATE: 2018  TIME: 10:03 AM    Assessment and Plan   Effusion of bursa of left knee [M25 462]  1  Effusion of bursa of left knee  Ambulatory referral to Sports Medicine   2  Class 1 obesity due to excess calories without serious comorbidity with body mass index (BMI) of 31 0 to 31 9 in adult           Patient Instructions     Patient Instructions   Referral to ortho if pain returns/continues  Wear brace for comfort  Call or return for problems/concerns          Chief Complaint     Chief Complaint   Patient presents with    Knee Pain     left knee pain, has pain         History of Present Illness       Pt  Recently seen in ER for left knee pain- woke up on [place] with pain and swelling in back of left knee  Has been wearing the brace they gave him, not using crutches  Pt  Reports the pain is improving  He had an xray- dx with DJD and left knee effusion        Review of Systems   Review of Systems   Constitutional: Negative for activity change, diaphoresis, fatigue and fever  HENT: Negative for congestion, facial swelling, hearing loss, rhinorrhea, sinus pain, sinus pressure, sneezing, sore throat and voice change  Eyes: Negative for discharge and visual disturbance  Respiratory: Negative for cough, choking, chest tightness, shortness of breath, wheezing and stridor  Cardiovascular: Negative for chest pain, palpitations and leg swelling  Gastrointestinal: Negative for abdominal distention, abdominal pain, constipation, diarrhea, nausea and vomiting  Endocrine: Negative for polydipsia, polyphagia and polyuria  Genitourinary: Negative for difficulty urinating, dysuria, frequency and urgency  Musculoskeletal: Positive for arthralgias, gait problem and joint swelling  Negative for back pain, myalgias, neck pain and neck stiffness     Skin: Negative for color change, rash and wound    Neurological: Negative for dizziness, syncope, speech difficulty, weakness, light-headedness and headaches  Hematological: Negative for adenopathy  Does not bruise/bleed easily  Psychiatric/Behavioral: Negative for agitation, behavioral problems, confusion, hallucinations, sleep disturbance and suicidal ideas  The patient is not nervous/anxious  Current Medications       Current Outpatient Medications:     ibuprofen (MOTRIN) 600 mg tablet, Take 1 tablet (600 mg total) by mouth every 6 (six) hours as needed for mild pain or moderate pain, Disp: 90 tablet, Rfl: 1    Current Allergies     Allergies as of 12/28/2018    (No Known Allergies)            The following portions of the patient's history were reviewed and updated as appropriate: allergies, current medications, past family history, past medical history, past social history, past surgical history and problem list      Past Medical History:   Diagnosis Date    Gout     Osteoarthritis        History reviewed  No pertinent surgical history  Family History   Problem Relation Age of Onset    Arthritis Mother     Coronary artery disease Father          Medications have been verified  Objective   /84 (BP Location: Right arm, Patient Position: Sitting, Cuff Size: Adult)   Pulse 75   Temp 99 °F (37 2 °C) (Tympanic)   Resp 18   Ht 5' 10" (1 778 m)   Wt 98 4 kg (217 lb)   SpO2 98%   BMI 31 14 kg/m²        Physical Exam     Physical Exam   Constitutional: He is oriented to person, place, and time  He appears well-developed and well-nourished  No distress  Cardiovascular: Normal rate, regular rhythm and normal heart sounds  No murmur heard  Pulmonary/Chest: Effort normal  No respiratory distress  He has no decreased breath sounds  He has wheezes in the right upper field, the right lower field, the left upper field and the left lower field     Musculoskeletal:        Left knee: He exhibits decreased range of motion and swelling (mild)  He exhibits no ecchymosis, no deformity, no laceration, no erythema, normal alignment, no LCL laxity, normal patellar mobility, no bony tenderness and no MCL laxity  No tenderness (denies tenderness at this time) found  Neurological: He is alert and oriented to person, place, and time  Skin: Skin is warm and dry  He is not diaphoretic  Psychiatric: He has a normal mood and affect  His behavior is normal  Judgment and thought content normal    Nursing note and vitals reviewed  BMI Counseling: Body mass index is 31 14 kg/m²  Discussed the patient's BMI with him  The BMI is above average  BMI counseling and education was provided to the patient  Nutrition recommendations include moderation in carbohydrate intake and increasing intake of lean protein

## 2018-12-28 NOTE — PATIENT INSTRUCTIONS
Referral to ortho if pain returns/continues  Wear brace for comfort  Call or return for problems/concerns

## 2019-01-03 ENCOUNTER — TELEPHONE (OUTPATIENT)
Dept: FAMILY MEDICINE CLINIC | Facility: CLINIC | Age: 59
End: 2019-01-03

## 2019-01-03 NOTE — TELEPHONE ENCOUNTER
John Anthony called just to let you know the medication you put on for his gout made his legs hurt real bad so he stopped taking it  He was happy to report the Gout has cleared up

## 2019-01-07 ENCOUNTER — OFFICE VISIT (OUTPATIENT)
Dept: OBGYN CLINIC | Facility: CLINIC | Age: 59
End: 2019-01-07
Payer: COMMERCIAL

## 2019-01-07 VITALS
DIASTOLIC BLOOD PRESSURE: 91 MMHG | RESPIRATION RATE: 16 BRPM | WEIGHT: 217 LBS | HEART RATE: 99 BPM | SYSTOLIC BLOOD PRESSURE: 139 MMHG | HEIGHT: 70 IN | BODY MASS INDEX: 31.07 KG/M2

## 2019-01-07 DIAGNOSIS — M25.462 KNEE EFFUSION, LEFT: ICD-10-CM

## 2019-01-07 DIAGNOSIS — M25.562 ACUTE PAIN OF LEFT KNEE: Primary | ICD-10-CM

## 2019-01-07 PROCEDURE — 99203 OFFICE O/P NEW LOW 30 MIN: CPT | Performed by: EMERGENCY MEDICINE

## 2019-01-07 NOTE — PROGRESS NOTES
Assessment/Plan:    Diagnoses and all orders for this visit:    Acute pain of left knee    Knee effusion, left  -     Ambulatory referral to Sports Medicine    Patient is much improved since onset  He does have effusion for which he declines aspiration today  Patient to return if worsening symptoms, follow up with PCP for gout treatment  DDx: Acute gouty arthritis vs OA vs Lyme  No signs septic arthritis    Return if symptoms worsen or fail to improve  Chief Complaint:   Left knee pain and swelling    Subjective:   Patient ID: Gaby Trevino is a 63 y o  male  NP presents for about 1-2 of left knee swelling and pain with no injury  He was evaluated in ER and with PCP, was taking allopurinol, ibuprofen and acetaminophen  Denied any fevers  He does have a hx of gout and OA  However, today he states he is much improved with no pain  He states he stopped taking allopurinol as he believes the pain was caused by this, and he notes migrating arthralgias  Review of Systems   Constitutional: Negative for fever  Respiratory: Negative for shortness of breath  Cardiovascular: Negative for chest pain  Gastrointestinal: Negative for abdominal pain  Musculoskeletal: Positive for arthralgias and joint swelling  Neurological: Negative for weakness  The following portions of the patient's chart were reviewed and updated as appropriate: Allergy:  No Known Allergies      Past Medical History:   Diagnosis Date    Gout     Osteoarthritis        History reviewed  No pertinent surgical history  Social History     Social History    Marital status: Single     Spouse name: N/A    Number of children: N/A    Years of education: N/A     Occupational History    Not on file       Social History Main Topics    Smoking status: Current Every Day Smoker     Packs/day: 3 00     Years: 15 00     Types: Cigarettes    Smokeless tobacco: Never Used    Alcohol use Yes      Comment: occasionally    Drug use: No    Sexual activity: Not on file     Other Topics Concern    Not on file     Social History Narrative    No narrative on file       Family History   Problem Relation Age of Onset    Arthritis Mother     Coronary artery disease Father        Medications:    Current Outpatient Prescriptions:     ibuprofen (MOTRIN) 600 mg tablet, Take 1 tablet (600 mg total) by mouth every 6 (six) hours as needed for mild pain or moderate pain (Patient not taking: Reported on 1/7/2019 ), Disp: 90 tablet, Rfl: 1    There is no problem list on file for this patient  Objective:  Right Knee Exam     Other   Swelling: none  Other tests: no effusion present      Left Knee Exam     Tenderness   The patient is experiencing no tenderness  Range of Motion   Extension: abnormal   Flexion: abnormal     Other   Erythema: absent  Sensation: normal  Pulse: present  Swelling: mild  Effusion: effusion present          Observations   Left Knee   Positive for effusion  Right Knee   Negative for effusion  Physical Exam   Musculoskeletal:        Right knee: He exhibits no effusion  Left knee: He exhibits effusion  Neurologic Exam    Procedures    I have personally reviewed pertinent films in PACS    Prior Xray Knee: + effusion

## 2019-05-07 DIAGNOSIS — M10.9 ACUTE GOUT INVOLVING TOE OF LEFT FOOT, UNSPECIFIED CAUSE: ICD-10-CM

## 2019-05-07 DIAGNOSIS — M17.10 OSTEOARTHRITIS OF KNEE, UNSPECIFIED LATERALITY, UNSPECIFIED OSTEOARTHRITIS TYPE: Primary | ICD-10-CM

## 2019-05-07 RX ORDER — IBUPROFEN 600 MG/1
600 TABLET ORAL EVERY 6 HOURS PRN
Qty: 90 TABLET | Refills: 1 | Status: SHIPPED | OUTPATIENT
Start: 2019-05-07 | End: 2019-11-29 | Stop reason: SDUPTHER

## 2019-10-16 ENCOUNTER — TELEPHONE (OUTPATIENT)
Dept: FAMILY MEDICINE CLINIC | Facility: CLINIC | Age: 59
End: 2019-10-16

## 2019-10-16 NOTE — TELEPHONE ENCOUNTER
Patient called he is having swelling and pain in knee same as last year what do you recommend the patient to do?   It only seems to happen in this kind of weather      please advice

## 2019-10-17 ENCOUNTER — TELEPHONE (OUTPATIENT)
Dept: FAMILY MEDICINE CLINIC | Facility: CLINIC | Age: 59
End: 2019-10-17

## 2019-10-17 NOTE — TELEPHONE ENCOUNTER
Patient called complaining of knee swelling  Wanted to know if he could take pain medication  I advised the patient he will need to have an evaluation  Appointment scheduled

## 2019-10-18 ENCOUNTER — OFFICE VISIT (OUTPATIENT)
Dept: FAMILY MEDICINE CLINIC | Facility: CLINIC | Age: 59
End: 2019-10-18
Payer: COMMERCIAL

## 2019-10-18 VITALS
SYSTOLIC BLOOD PRESSURE: 144 MMHG | RESPIRATION RATE: 18 BRPM | WEIGHT: 227 LBS | BODY MASS INDEX: 32.5 KG/M2 | TEMPERATURE: 98 F | DIASTOLIC BLOOD PRESSURE: 82 MMHG | OXYGEN SATURATION: 98 % | HEART RATE: 105 BPM | HEIGHT: 70 IN

## 2019-10-18 DIAGNOSIS — M10.9 ACUTE GOUT OF RIGHT KNEE, UNSPECIFIED CAUSE: Primary | ICD-10-CM

## 2019-10-18 DIAGNOSIS — Z12.11 SCREENING FOR COLON CANCER: ICD-10-CM

## 2019-10-18 DIAGNOSIS — Z11.59 ENCOUNTER FOR HEPATITIS C SCREENING TEST FOR LOW RISK PATIENT: ICD-10-CM

## 2019-10-18 DIAGNOSIS — E66.09 CLASS 1 OBESITY DUE TO EXCESS CALORIES WITHOUT SERIOUS COMORBIDITY WITH BODY MASS INDEX (BMI) OF 32.0 TO 32.9 IN ADULT: ICD-10-CM

## 2019-10-18 DIAGNOSIS — Z12.5 SCREENING FOR PROSTATE CANCER: ICD-10-CM

## 2019-10-18 PROCEDURE — 3008F BODY MASS INDEX DOCD: CPT | Performed by: NURSE PRACTITIONER

## 2019-10-18 PROCEDURE — 99213 OFFICE O/P EST LOW 20 MIN: CPT | Performed by: NURSE PRACTITIONER

## 2019-10-18 RX ORDER — PREDNISONE 20 MG/1
TABLET ORAL
Qty: 15 TABLET | Refills: 0 | Status: SHIPPED | OUTPATIENT
Start: 2019-10-18 | End: 2020-09-22

## 2019-10-18 NOTE — PATIENT INSTRUCTIONS
Prednisone and Ibuprofen as directed  Return or call for orthopedic consult if no improvement  Get stool sample kit done for colon cancer screening

## 2019-10-18 NOTE — PROGRESS NOTES
[address] [facility]        NAME: Avtar Oneal is a 63 y o  male  : 1960    MRN: [id number]  DATE: 2019  TIME: 12:45 PM    Assessment and Plan   Acute gout of right knee, unspecified cause [M10 9]  1  Acute gout of right knee, unspecified cause  predniSONE 20 mg tablet    Comprehensive metabolic panel    Uric acid   2  Screening for colon cancer  Occult Blood, Fecal Immunochemical    Lipid panel   3  Encounter for hepatitis C screening test for low risk patient     4  Screening for prostate cancer  PSA, total and free   5  Class 1 obesity due to excess calories without serious comorbidity with body mass index (BMI) of 32 0 to 32 9 in adult           Patient Instructions     Patient Instructions   Prednisone and Ibuprofen as directed  Return or call for orthopedic consult if no improvement  Get stool sample kit done for colon cancer screening            Chief Complaint     Chief Complaint   Patient presents with    Joint Swelling         History of Present Illness       Right knee pain and swelling x a few days, no injury  Has history of gout      Review of Systems   Review of Systems   Constitutional: Negative for activity change, diaphoresis, fatigue and fever  HENT: Negative for congestion, facial swelling, hearing loss, rhinorrhea, sinus pressure, sinus pain, sneezing, sore throat and voice change  Eyes: Negative for discharge and visual disturbance  Respiratory: Negative for cough, choking, chest tightness, shortness of breath, wheezing and stridor  Cardiovascular: Negative for chest pain, palpitations and leg swelling  Gastrointestinal: Negative for abdominal distention, abdominal pain, constipation, diarrhea, nausea and vomiting  Endocrine: Negative for polydipsia, polyphagia and polyuria  Genitourinary: Negative for difficulty urinating, dysuria, frequency and urgency  Musculoskeletal: Positive for arthralgias and gait problem   Negative for back pain, joint swelling, myalgias, neck pain and neck stiffness  Skin: Negative for color change, rash and wound  Neurological: Negative for dizziness, syncope, speech difficulty, weakness, light-headedness and headaches  Hematological: Negative for adenopathy  Does not bruise/bleed easily  Psychiatric/Behavioral: Negative for agitation, behavioral problems, confusion, hallucinations, sleep disturbance and suicidal ideas  The patient is not nervous/anxious  Current Medications       Current Outpatient Medications:     ibuprofen (MOTRIN) 600 mg tablet, Take 1 tablet (600 mg total) by mouth every 6 (six) hours as needed for mild pain or moderate pain, Disp: 90 tablet, Rfl: 1    predniSONE 20 mg tablet, 20mg PO twice daily x 5 days then 20mg PO daily x 5 days, Disp: 15 tablet, Rfl: 0    Current Allergies     Allergies as of 10/18/2019    (No Known Allergies)            The following portions of the patient's history were reviewed and updated as appropriate: allergies, current medications, past family history, past medical history, past social history, past surgical history and problem list      Past Medical History:   Diagnosis Date    Gout     Osteoarthritis        History reviewed  No pertinent surgical history  Family History   Problem Relation Age of Onset    Arthritis Mother     Coronary artery disease Father          Medications have been verified  Objective   /82   Pulse 105   Temp 98 °F (36 7 °C) (Tympanic)   Resp 18   Ht 5' 10" (1 778 m)   Wt 103 kg (227 lb)   SpO2 98%   BMI 32 57 kg/m²        Physical Exam     Physical Exam   Constitutional: He is oriented to person, place, and time  He appears well-developed and well-nourished  No distress  Cardiovascular: Normal rate, regular rhythm and normal heart sounds  No murmur heard  Pulmonary/Chest: Effort normal  No respiratory distress   He has wheezes in the right upper field, the right lower field, the left upper field and the left lower field  Musculoskeletal:        Right knee: He exhibits decreased range of motion, swelling and abnormal patellar mobility  He exhibits no ecchymosis, no deformity, no laceration, no erythema, normal alignment and no LCL laxity  Tenderness (posterior) found  Neurological: He is alert and oriented to person, place, and time  Skin: Skin is warm and dry  He is not diaphoretic  Psychiatric: He has a normal mood and affect  His behavior is normal  Judgment and thought content normal    Nursing note and vitals reviewed  PHQ-9 Depression Screening    PHQ-9:    Frequency of the following problems over the past two weeks:       Little interest or pleasure in doing things:  0 - not at all  Feeling down, depressed, or hopeless:  0 - not at all  PHQ-2 Score:  0         BMI Counseling: Body mass index is 32 57 kg/m²  The BMI is above normal  Nutrition recommendations include reducing fast food intake, consuming healthier snacks, decreasing soda and/or juice intake, moderation in carbohydrate intake and increasing intake of lean protein

## 2019-11-29 DIAGNOSIS — M17.10 OSTEOARTHRITIS OF KNEE, UNSPECIFIED LATERALITY, UNSPECIFIED OSTEOARTHRITIS TYPE: ICD-10-CM

## 2019-11-29 RX ORDER — IBUPROFEN 600 MG/1
600 TABLET ORAL EVERY 6 HOURS PRN
Qty: 90 TABLET | Refills: 1 | Status: SHIPPED | OUTPATIENT
Start: 2019-11-29 | End: 2020-09-29

## 2019-11-29 NOTE — TELEPHONE ENCOUNTER
Patient called having right knee pain and swelling again  He is requesting a refill of Motrin as it worked well for the pain

## 2020-08-18 ENCOUNTER — TELEMEDICINE (OUTPATIENT)
Dept: FAMILY MEDICINE CLINIC | Facility: CLINIC | Age: 60
End: 2020-08-18
Payer: COMMERCIAL

## 2020-08-18 DIAGNOSIS — R53.83 FATIGUE, UNSPECIFIED TYPE: ICD-10-CM

## 2020-08-18 DIAGNOSIS — I10 HYPERTENSION, UNSPECIFIED TYPE: Primary | ICD-10-CM

## 2020-08-18 DIAGNOSIS — Z72.0 TOBACCO ABUSE: ICD-10-CM

## 2020-08-18 PROCEDURE — 99213 OFFICE O/P EST LOW 20 MIN: CPT | Performed by: INTERNAL MEDICINE

## 2020-08-18 NOTE — PROGRESS NOTES
Virtual Brief Visit    Assessment/Plan:    Problem List Items Addressed This Visit     None      Visit Diagnoses     Hypertension, unspecified type    -  Primary    Reports elevated Home BP, 160/100  Will schedule for in-person RN visit for BP check here  Pt overdue for lab work as well  Advised to go to ER if chest pain, dizziness, headaches or focal weakness  Relevant Orders    CBC and differential    Comprehensive metabolic panel    Lipid Panel with Direct LDL reflex    Microalbumin / creatinine urine ratio    HEMOGLOBIN A1C W/ EAG ESTIMATION    Tobacco abuse      Counseled about smoking cessation       Fatigue, unspecified type        Unclear, no lightheadedness or SOB  Relevant Orders    CBC and differential    Comprehensive metabolic panel    TSH, 3rd generation with Free T4 reflex                Reason for visit is   Chief Complaint   Patient presents with    Virtual Brief Visit        Encounter provider Mireya Wallace MD    Provider located at [address] [place] [address] [id number]    Recent Visits  No visits were found meeting these conditions  Showing recent visits within past 7 days and meeting all other requirements     Today's Visits  Date Type Provider Dept   08/18/20 Avinash Perdue MD [facility]   Showing today's visits and meeting all other requirements     Future Appointments  No visits were found meeting these conditions  Showing future appointments within next 150 days and meeting all other requirements        After connecting through telephone, the patient was identified by name and date of birth  Lyudmila Cardozo was informed that this is a telemedicine visit and that the visit is being conducted through telephone  My office door was closed  No one else was in the room  He acknowledged consent and understanding of privacy and security of the platform   The patient has agreed to participate and understands he can discontinue the visit at any time  Patient is aware this is a billable service  Subjective    Chidi Licona is a 62 y o  male who presents for  Same, day sick visit due to fatigue and nausea  Pt reports symptoms initially began back in March, describes headache and stomach upset after smoking cigarettes  Tried cutting back and symptoms improved until recently  Reports feeling unwell last night, like he was "going to die or something"  Father check his vitals at home: Temp 98 1, /103, HR 89  Denies chest pain, lightheadedness, dyspnea or cough  Does report nausea but no abdominal pain, vomiting or diarrhea  No fevers, chills, sore throat, rhinorrhea  No sick contacts  Past Medical History:   Diagnosis Date    Gout     Osteoarthritis        No past surgical history on file  Current Outpatient Medications   Medication Sig Dispense Refill    ibuprofen (MOTRIN) 600 mg tablet Take 1 tablet (600 mg total) by mouth every 6 (six) hours as needed for mild pain or moderate pain 90 tablet 1    predniSONE 20 mg tablet 20mg PO twice daily x 5 days then 20mg PO daily x 5 days 15 tablet 0     No current facility-administered medications for this visit  No Known Allergies    Review of Systems   Constitutional: Positive for appetite change and fatigue  Negative for chills, diaphoresis and fever  Respiratory: Negative for cough, chest tightness, shortness of breath and wheezing  Cardiovascular: Negative for chest pain, palpitations and leg swelling  Gastrointestinal: Positive for nausea  Negative for abdominal distention, abdominal pain, blood in stool, constipation, diarrhea and vomiting  Neurological: Positive for headaches  Negative for dizziness, tremors, speech difficulty, weakness, light-headedness and numbness  There were no vitals filed for this visit        I spent 10 minutes directly with the patient during this visit    VIRTUAL VISIT [address] [place] acknowledges that he has consented to an online visit or consultation  He understands that the online visit is based solely on information provided by him, and that, in the absence of a face-to-face physical evaluation by the physician, the diagnosis he receives is both limited and provisional in terms of accuracy and completeness  This is not intended to replace a full medical face-to-face evaluation by the physician  Chris Zambrano understands and accepts these terms

## 2020-08-19 ENCOUNTER — LAB (OUTPATIENT)
Dept: LAB | Facility: CLINIC | Age: 60
End: 2020-08-19
Payer: COMMERCIAL

## 2020-08-19 ENCOUNTER — APPOINTMENT (OUTPATIENT)
Dept: LAB | Facility: CLINIC | Age: 60
End: 2020-08-19
Payer: COMMERCIAL

## 2020-08-19 DIAGNOSIS — Z12.5 SCREENING FOR PROSTATE CANCER: ICD-10-CM

## 2020-08-19 DIAGNOSIS — Z12.11 SCREENING FOR COLON CANCER: ICD-10-CM

## 2020-08-19 DIAGNOSIS — I10 HYPERTENSION, UNSPECIFIED TYPE: ICD-10-CM

## 2020-08-19 DIAGNOSIS — R53.83 FATIGUE, UNSPECIFIED TYPE: ICD-10-CM

## 2020-08-19 DIAGNOSIS — M10.9 ACUTE GOUT OF RIGHT KNEE, UNSPECIFIED CAUSE: ICD-10-CM

## 2020-08-19 LAB
ALBUMIN SERPL BCP-MCNC: 3.6 G/DL (ref 3.5–5)
ALP SERPL-CCNC: 97 U/L (ref 46–116)
ALT SERPL W P-5'-P-CCNC: 54 U/L (ref 12–78)
ANION GAP SERPL CALCULATED.3IONS-SCNC: 7 MMOL/L (ref 4–13)
AST SERPL W P-5'-P-CCNC: 27 U/L (ref 5–45)
BASOPHILS # BLD AUTO: 0.05 THOUSANDS/ΜL (ref 0–0.1)
BASOPHILS NFR BLD AUTO: 1 % (ref 0–1)
BILIRUB SERPL-MCNC: 0.27 MG/DL (ref 0.2–1)
BUN SERPL-MCNC: 13 MG/DL (ref 5–25)
CALCIUM SERPL-MCNC: 8.9 MG/DL (ref 8.3–10.1)
CHLORIDE SERPL-SCNC: 107 MMOL/L (ref 100–108)
CHOLEST SERPL-MCNC: 239 MG/DL (ref 50–200)
CO2 SERPL-SCNC: 25 MMOL/L (ref 21–32)
CREAT SERPL-MCNC: 1.12 MG/DL (ref 0.6–1.3)
CREAT UR-MCNC: 88.5 MG/DL
EOSINOPHIL # BLD AUTO: 0.11 THOUSAND/ΜL (ref 0–0.61)
EOSINOPHIL NFR BLD AUTO: 1 % (ref 0–6)
ERYTHROCYTE [DISTWIDTH] IN BLOOD BY AUTOMATED COUNT: 13.5 % (ref 11.6–15.1)
EST. AVERAGE GLUCOSE BLD GHB EST-MCNC: 123 MG/DL
GFR SERPL CREATININE-BSD FRML MDRD: 72 ML/MIN/1.73SQ M
GLUCOSE P FAST SERPL-MCNC: 115 MG/DL (ref 65–99)
HBA1C MFR BLD: 5.9 %
HCT VFR BLD AUTO: 40.2 % (ref 36.5–49.3)
HDLC SERPL-MCNC: 41 MG/DL
HEMOCCULT STL QL IA: POSITIVE
HGB BLD-MCNC: 13 G/DL (ref 12–17)
IMM GRANULOCYTES # BLD AUTO: 0.07 THOUSAND/UL (ref 0–0.2)
IMM GRANULOCYTES NFR BLD AUTO: 1 % (ref 0–2)
LDLC SERPL CALC-MCNC: 161 MG/DL (ref 0–100)
LYMPHOCYTES # BLD AUTO: 2.75 THOUSANDS/ΜL (ref 0.6–4.47)
LYMPHOCYTES NFR BLD AUTO: 26 % (ref 14–44)
MCH RBC QN AUTO: 31.3 PG (ref 26.8–34.3)
MCHC RBC AUTO-ENTMCNC: 32.3 G/DL (ref 31.4–37.4)
MCV RBC AUTO: 97 FL (ref 82–98)
MICROALBUMIN UR-MCNC: <5 MG/L (ref 0–20)
MICROALBUMIN/CREAT 24H UR: <6 MG/G CREATININE (ref 0–30)
MONOCYTES # BLD AUTO: 0.56 THOUSAND/ΜL (ref 0.17–1.22)
MONOCYTES NFR BLD AUTO: 5 % (ref 4–12)
NEUTROPHILS # BLD AUTO: 6.92 THOUSANDS/ΜL (ref 1.85–7.62)
NEUTS SEG NFR BLD AUTO: 66 % (ref 43–75)
NRBC BLD AUTO-RTO: 0 /100 WBCS
PLATELET # BLD AUTO: 315 THOUSANDS/UL (ref 149–390)
PMV BLD AUTO: 10.8 FL (ref 8.9–12.7)
POTASSIUM SERPL-SCNC: 4 MMOL/L (ref 3.5–5.3)
PROT SERPL-MCNC: 8.1 G/DL (ref 6.4–8.2)
RBC # BLD AUTO: 4.16 MILLION/UL (ref 3.88–5.62)
SODIUM SERPL-SCNC: 139 MMOL/L (ref 136–145)
TRIGL SERPL-MCNC: 186 MG/DL
TSH SERPL DL<=0.05 MIU/L-ACNC: 0.8 UIU/ML (ref 0.36–3.74)
URATE SERPL-MCNC: 8.2 MG/DL (ref 4.2–8)
WBC # BLD AUTO: 10.46 THOUSAND/UL (ref 4.31–10.16)

## 2020-08-19 PROCEDURE — 84550 ASSAY OF BLOOD/URIC ACID: CPT

## 2020-08-19 PROCEDURE — 84154 ASSAY OF PSA FREE: CPT

## 2020-08-19 PROCEDURE — 82043 UR ALBUMIN QUANTITATIVE: CPT | Performed by: INTERNAL MEDICINE

## 2020-08-19 PROCEDURE — 36415 COLL VENOUS BLD VENIPUNCTURE: CPT

## 2020-08-19 PROCEDURE — G0328 FECAL BLOOD SCRN IMMUNOASSAY: HCPCS

## 2020-08-19 PROCEDURE — 85025 COMPLETE CBC W/AUTO DIFF WBC: CPT

## 2020-08-19 PROCEDURE — 82570 ASSAY OF URINE CREATININE: CPT | Performed by: INTERNAL MEDICINE

## 2020-08-19 PROCEDURE — 80061 LIPID PANEL: CPT

## 2020-08-19 PROCEDURE — 84153 ASSAY OF PSA TOTAL: CPT

## 2020-08-19 PROCEDURE — 80053 COMPREHEN METABOLIC PANEL: CPT

## 2020-08-19 PROCEDURE — 83036 HEMOGLOBIN GLYCOSYLATED A1C: CPT

## 2020-08-19 PROCEDURE — 84443 ASSAY THYROID STIM HORMONE: CPT

## 2020-08-20 DIAGNOSIS — E78.2 MIXED HYPERLIPIDEMIA: ICD-10-CM

## 2020-08-20 DIAGNOSIS — R73.03 PREDIABETES: Primary | ICD-10-CM

## 2020-08-20 RX ORDER — ATORVASTATIN CALCIUM 10 MG/1
10 TABLET, FILM COATED ORAL
Qty: 30 TABLET | Refills: 0 | Status: SHIPPED | OUTPATIENT
Start: 2020-08-20 | End: 2020-09-29 | Stop reason: SDUPTHER

## 2020-08-20 NOTE — RESULT ENCOUNTER NOTE
Please advise pt that his labs show pre-diabetes, elevated cholesterol and elevated liver enzymes  I'd like him to start atorvastatin 10mg at bedtime to reduce risk of heart attack and stroke, as well as metformin daily with dinner  Recommend smoking cessation as well  Discuss further with PCP In September

## 2020-08-21 LAB
PSA FREE MFR SERPL: 30 %
PSA FREE SERPL-MCNC: 0.12 NG/ML
PSA SERPL-MCNC: 0.4 NG/ML (ref 0–4)

## 2020-09-03 ENCOUNTER — TELEPHONE (OUTPATIENT)
Dept: FAMILY MEDICINE CLINIC | Facility: CLINIC | Age: 60
End: 2020-09-03

## 2020-09-03 NOTE — TELEPHONE ENCOUNTER
Patient called explaining he takes Atorvastatin 10mg 1 daily and Metformin 500mg 1 daily with dinner  Problem is last few days he has not been sleeping and was wondering if he could take an over the counter sleep aid to help him gets some sleep  Any questions call him at [phone]  If you allow he can just pick some up at the store

## 2020-09-08 ENCOUNTER — OFFICE VISIT (OUTPATIENT)
Dept: FAMILY MEDICINE CLINIC | Facility: CLINIC | Age: 60
End: 2020-09-08
Payer: COMMERCIAL

## 2020-09-08 VITALS
HEIGHT: 70 IN | OXYGEN SATURATION: 98 % | BODY MASS INDEX: 33.21 KG/M2 | HEART RATE: 84 BPM | WEIGHT: 232 LBS | SYSTOLIC BLOOD PRESSURE: 134 MMHG | TEMPERATURE: 97.3 F | DIASTOLIC BLOOD PRESSURE: 82 MMHG | RESPIRATION RATE: 20 BRPM

## 2020-09-08 DIAGNOSIS — Z71.6 ENCOUNTER FOR TOBACCO USE CESSATION COUNSELING: ICD-10-CM

## 2020-09-08 DIAGNOSIS — I10 HYPERTENSION, UNSPECIFIED TYPE: ICD-10-CM

## 2020-09-08 DIAGNOSIS — Z12.11 COLON CANCER SCREENING: ICD-10-CM

## 2020-09-08 DIAGNOSIS — E66.09 CLASS 1 OBESITY DUE TO EXCESS CALORIES WITH SERIOUS COMORBIDITY AND BODY MASS INDEX (BMI) OF 33.0 TO 33.9 IN ADULT: ICD-10-CM

## 2020-09-08 DIAGNOSIS — R73.03 PREDIABETES: ICD-10-CM

## 2020-09-08 DIAGNOSIS — E78.2 MIXED HYPERLIPIDEMIA: ICD-10-CM

## 2020-09-08 DIAGNOSIS — Z11.59 ENCOUNTER FOR HEPATITIS C SCREENING TEST FOR LOW RISK PATIENT: ICD-10-CM

## 2020-09-08 DIAGNOSIS — Z00.00 ANNUAL PHYSICAL EXAM: Primary | ICD-10-CM

## 2020-09-08 PROCEDURE — 99396 PREV VISIT EST AGE 40-64: CPT | Performed by: NURSE PRACTITIONER

## 2020-09-08 PROCEDURE — 99214 OFFICE O/P EST MOD 30 MIN: CPT | Performed by: NURSE PRACTITIONER

## 2020-09-08 RX ORDER — BUPROPION HYDROCHLORIDE 150 MG/1
150 TABLET, EXTENDED RELEASE ORAL 2 TIMES DAILY
Qty: 180 TABLET | Refills: 3 | Status: SHIPPED | OUTPATIENT
Start: 2020-09-08 | End: 2020-10-22 | Stop reason: SDUPTHER

## 2020-09-08 NOTE — PROGRESS NOTES
[address] [place] [address] Primary Care        NAME: Ron Skaggs is a 62 y o  male  : 1960    MRN: [id number]  DATE: 2020  TIME: 10:15 AM    Assessment and Plan   Hypertension, unspecified type [I10]  1  Hypertension, unspecified type  Comprehensive metabolic panel   2  Encounter for hepatitis C screening test for low risk patient  Hepatitis C antibody   3  Encounter for tobacco use cessation counseling  buPROPion (WELLBUTRIN SR) 150 mg 12 hr tablet   4  Prediabetes  HEMOGLOBIN A1C W/ EAG ESTIMATION    Comprehensive metabolic panel   5  Mixed hyperlipidemia  Lipid panel   6  Colon cancer screening  Cologuard         Patient Instructions     Patient Instructions   Get bloodwork done after 2020- fast for 8-10 hours  Continue same medications  Wellbutrin SR twice daily as directed for smoking cessation  Medcheck/lab review in 4 months or sooner if needed    Meal Planning with Diabetes Exchanges   AMBULATORY CARE:   Diabetes exchanges  are servings of food that contain similar amounts of carbohydrate, fat, protein, and calories within a food group  The exchanges can be used to develop a healthy meal plan that helps to keep your blood sugar within the recommended levels  A meal plan with the right amount of carbohydrates is especially important  Your blood sugar naturally rises after you eat carbohydrates  Too many carbohydrates in 1 meal or snack can raise your blood sugar level  Carbohydrates are found in starches, fruit, milk, yogurt, and sweets  How to create a meal plan with exchanges:  A dietitian will work with you to develop a healthy meal plan that is right for you  This meal plan will include the amount of exchanges you can have from each food group throughout the day  Follow your meal plan by keeping track of the amount of exchanges you eat for each meal and snack  Your meal plan will be based on your age, weight, blood sugar levels, medicine, and activity level     Starch food group exchanges:  Each exchange below contains about 15 grams of carbohydrate , 3 grams of protein, 1 gram of fat, and 80 calories  · 1 ounce of white, whole wheat or rye bread (1 slice)    · 1 ounce of bagel (about ¼ of a bagel)    · 1 6-inch flour or corn tortilla or 1 4-inch pancake (about ¼ inch thick)    · ? cup of cooked pasta or rice    · ¾ cup of dry, ready-to-eat cereal with no sugar added     · ½ cup of cooked cereal, such as oatmeal    · 3 dylon cracker squares or 8 animal crackers    · 6 saltine-type crackers or     · 3 cups of popcorn or ¾ ounce of pretzels     · Starchy vegetables and cooked legumes:      ¨ ½ cup of corn, green peas, sweet potatoes, or mashed potatoes     ¨ ¼ of a large baked potato     ¨ 1 cup of acorn, butternut squash, or pumpkin     ¨ ½ cup of beans, lentils, or peas (such as har[place], kidney, or black-eyed)    ¨ ? cup of lima beans  Fruit group exchanges:  Each exchange contains about 15 grams of carbohydrate  and 60 calories  · 1 small (4 ounce) apple, banana orange, or nectarine    · ½ cup of canned or fresh fruit    · ½ cup (4 ounces) of unsweetened fruit juice    · 2 tablespoons of dried fruit  Milk group exchanges:  Each exchange contains about 12 grams of carbohydrate  and 8 grams of protein  The amount of fat and calories in each serving depends on the type of milk (such as whole, low-fat, or fat-free)  · 1 cup fat-free or low-fat milk    · ¾ cup of plain, nonfat yogurt    · 1 cup fat-free, flavored yogurt with artificial (no calorie) sweetener  Non-starchy vegetable group exchanges:  Each exchange contains about 5 grams of carbohydrate , 2 grams of protein, and 25 calories  Examples include beets, broccoli, cabbage, carrots, cauliflower, cucumber, mushrooms, tomatoes, and zucchini    · ½ cup of cooked vegetables or 1 cup of raw vegetables     · ½ cup of vegetable juice  Meat and meat substitute group exchanges:  Each exchange of a lean meat  listed below contains about 7 grams of protein, 0 to 3 grams of fat, and 45 calories  The meat and meat substitutes food group does not contain any carbohydrates  Medium and high-fat meats have more calories  · 1 ounce of chicken or turkey without skin, or 1 ounce of fish (not breaded or fried)     · 1 ounce of lean beef, pork, or lamb     · 1-inch cube or 1 ounce of low-fat cheese     · 2 egg whites or ¼ cup of egg substitute     · ½ cup of tofu  Sweets, desserts, and other carbohydrate group exchanges:   · Sweets and other desserts:  Each exchange has about 15 grams of carbohydrate   ¨ 1 ounce of mildred food cake or 2-inch square cake (unfrosted)    ¨ 2 small cookies     ¨ ½ cup of sugar-free, fat-free ice cream    ¨ 1 tablespoon of syrup, jam, jelly, table sugar, or honey    · Combination foods:     ¨ 1 cup of an entrée, such as lasagna, spaghetti with meatballs, macaroni and cheese, and chili with beans (each serving counts as 2 carbohydrate exchanges )     ¨ 1 cup of tomato or vegetable beef soup (each serving counts as 1 carbohydrate exchange )  Fat group exchanges:  Each exchange contains 5 grams of fat and 45 calories  · 1 teaspoon of oil (such as canola, olive, or corn oil)     · 6 almonds or cashews, 10 peanuts, or 4 pecan halves     · 2 tablespoons of avocado     · ½ tablespoon of peanut butter     · 1 teaspoon of regular margarine or 2 teaspoons of low-fat margarine     · 1 teaspoon of regular butter or 1 tablespoon of low-fat butter     · 1 teaspoon of regular mayonnaise or 1 tablespoon of low-fat mayonnaise     · 1 tablespoon of regular salad dressing or 2 tablespoons of low-fat salad dressing  Free foods: The foods on this list are called free foods because they have very few calories  Free foods usually do not increase your blood sugar if you limit them    · 1 tablespoon of catsup or taco sauce     · ¼ cup of salsa     · 2 tablespoons of sugar-free syrup or 2 teaspoons of light jam or jelly     · 1 tablespoon of fat-free salad dressing     · 4 tablespoons of fat-free margarine or fat-free mayonnaise     · Sugar-free drinks: diet soda, sugar-free drink mixes, or mineral water     · Low-sodium bouillon or fat-free broth     · Mustard     · Seasonings such as spices, herbs, and garlic     · Sugar-free gelatin without added fruit  Other healthy nutrition guidelines:   · Eat more fiber  Choose foods that are good sources of fiber, such as fruits, vegetables, and whole grains  Cereals that contain 5 or more grams of fiber per serving are good sources of fiber  Legumes such as garbanzo, harrington beans, kidney beans, and lentils are also good sources  · Limit fat  Ask your dietitian or healthcare provider how much fat you should eat each day  Choose foods low in fat, saturated fat, trans fat, and cholesterol  Examples include turkey or chicken without the skin, fish, lean cuts of meat, and beans  Low-fat dairy foods, such as low-fat or fat-free milk and low-fat yogurt are also good choices  Omega-3 fatty acids are healthy fats that are found in canola oil, soybean oil and fatty fish  [place], albacore tuna, and sardines are good sources of omega 3 fatty acids  Eat 2 servings of these types of fish each week  Do not eat fried fish  · Limit sugar  Sugar and sweets must be counted toward the carbohydrate exchanges that you can have within your meal plan  Limit sugar and sweets because they are usually also high in calories and fat  Eat smaller portions of sweets by sharing a dessert or asking for a child-size portion at a restaurant  · Limit sodium  (salt) to about 2,300 mg per day  You may need to eat even less sodium if you have certain medical conditions  Foods high in sodium include soy sauce, potato chips, and soup  · Limit alcohol  Ask your healthcare provider if it is safe for you to drink alcohol  If alcohol is safe for you to have, eat a meal when you drink alcohol   If you drink alcohol on an empty stomach, your blood sugar may drop to a low level  Women should limit alcohol to 1 drink per day  Men should limit alcohol to 2 drinks per day  A drink of alcohol is 5 ounces of wine, 12 ounces of beer, or 1½ ounces of liquor  Other ways to manage your diabetes:   · Control your blood sugar level  Test your blood sugar level regularly and keep a record of the results  Ask your healthcare provider when and how often to test your blood sugar  You may need to check your blood sugar level at least 3 times each day  · Talk to your healthcare provider about your weight  Ask if you need to lose weight, and how much you need to lose  If you are overweight, you may need to make other changes to lose weight  Ask your healthcare provider to help you create a weight loss program      · Exercise  can help to control your blood sugar levels and decrease your risk of heart disease  It can also help you lose or maintain your weight  Get at least 30 minutes of exercise, 5 times each week  Do resistance training (using weights) 2 times each week  Do not sit for longer than 90 minutes  Work with your healthcare provider to plan the best exercise program for you  Contact your healthcare provider if:   · You have high blood sugar levels during a certain time of day, or almost all of the time  · You often have low blood sugar levels  · You have questions or concerns about your condition or care  © 2017 [employer]  Information is for End User's use only and may not be sold, redistributed or otherwise used for commercial purposes  All illustrations and images included in CareNotes® are the copyrighted property of [employer]  or G[employer]  The above information is an  only  It is not intended as medical advice for individual conditions or treatments  Talk to your doctor, nurse or pharmacist before following any medical regimen to see if it is safe and effective for you              Chief Complaint Chief Complaint   Patient presents with    Annual Exam         History of Present Illness       [facility]  Requesting to start medication for smoking cigarettes- "I smoke as many as I can but then I feel sick"  Has been taking his Metformin and Atorvastatin as directed  "I don't drink water, I drink Iced Tea"  Discussed positive stool test- patient reports he doesn't see blood that it must have been something he ate- is willing to do the cologuard      Review of Systems   Review of Systems   Constitutional: Positive for fatigue  Negative for activity change, diaphoresis and fever  HENT: Negative for congestion, facial swelling, hearing loss, rhinorrhea, sinus pressure, sinus pain, sneezing, sore throat and voice change  Eyes: Negative for discharge and visual disturbance  Respiratory: Negative for cough, choking, chest tightness, shortness of breath, wheezing and stridor  Cardiovascular: Negative for chest pain, palpitations and leg swelling  Gastrointestinal: Negative for abdominal distention, abdominal pain, constipation, diarrhea, nausea and vomiting  Endocrine: Negative for polydipsia, polyphagia and polyuria  Genitourinary: Negative for difficulty urinating, dysuria, frequency and urgency  Musculoskeletal: Negative for arthralgias, back pain, gait problem, joint swelling, myalgias, neck pain and neck stiffness  Skin: Negative for color change, rash and wound  Neurological: Negative for dizziness, syncope, speech difficulty, weakness, light-headedness and headaches  Hematological: Negative for adenopathy  Does not bruise/bleed easily  Psychiatric/Behavioral: Negative for agitation, behavioral problems, confusion, hallucinations, sleep disturbance and suicidal ideas  The patient is not nervous/anxious            Current Medications       Current Outpatient Medications:     atorvastatin (LIPITOR) 10 mg tablet, Take 1 tablet (10 mg total) by mouth daily at bedtime, Disp: 30 tablet, Rfl: 0    buPROPion (WELLBUTRIN SR) 150 mg 12 hr tablet, Take 1 tablet (150 mg total) by mouth 2 (two) times a day, Disp: 180 tablet, Rfl: 3    ibuprofen (MOTRIN) 600 mg tablet, Take 1 tablet (600 mg total) by mouth every 6 (six) hours as needed for mild pain or moderate pain, Disp: 90 tablet, Rfl: 1    metFORMIN (GLUCOPHAGE) 500 mg tablet, Take 1 tablet (500 mg total) by mouth daily with dinner, Disp: 30 tablet, Rfl: 0    predniSONE 20 mg tablet, 20mg PO twice daily x 5 days then 20mg PO daily x 5 days, Disp: 15 tablet, Rfl: 0    Current Allergies     Allergies as of 09/08/2020    (No Known Allergies)            The following portions of the patient's history were reviewed and updated as appropriate: allergies, current medications, past family history, past medical history, past social history, past surgical history and problem list      Past Medical History:   Diagnosis Date    Gout     Osteoarthritis        No past surgical history on file  Family History   Problem Relation Age of Onset    Arthritis Mother     Coronary artery disease Father          Medications have been verified  Objective   /82   Pulse 84   Temp (!) 97 3 °F (36 3 °C) (Tympanic)   Resp 20   Ht 5' 10" (1 778 m)   Wt 105 kg (232 lb)   SpO2 98%   BMI 33 29 kg/m²        Physical Exam     Physical Exam  Vitals signs and nursing note reviewed  Constitutional:       General: He is not in acute distress  Appearance: He is well-developed  He is not diaphoretic  Neck:      Musculoskeletal: Normal range of motion and neck supple  Thyroid: No thyromegaly  Trachea: No tracheal deviation  Cardiovascular:      Rate and Rhythm: Normal rate and regular rhythm  Heart sounds: Normal heart sounds  No murmur  Comments: Negative carotid bruits bilateral  Pulmonary:      Effort: Pulmonary effort is normal  No respiratory distress  Breath sounds: Wheezing present     Musculoskeletal: Normal range of motion  General: No tenderness or deformity  Skin:     General: Skin is warm and dry  Neurological:      Mental Status: He is alert and oriented to person, place, and time  Psychiatric:         Speech: Speech normal          Behavior: Behavior normal          Thought Content:  Thought content normal          Judgment: Judgment normal

## 2020-09-08 NOTE — PROGRESS NOTES
HPI:  Sandy Guzman is a 62 y o  male here for his yearly health maintenance exam    Patient Active Problem List   Diagnosis    Gout     Past Medical History:   Diagnosis Date    Gout     Osteoarthritis        BMI Counseling: Body mass index is 33 29 kg/m²  The BMI is above normal  Nutrition recommendations include decreasing portion sizes, encouraging healthy choices of fruits and vegetables, decreasing fast food intake, consuming healthier snacks, limiting drinks that contain sugar, moderation in carbohydrate intake and increasing intake of lean protein  PHQ-9 Depression Screening    PHQ-9:    Frequency of the following problems over the past two weeks:       Little interest or pleasure in doing things:  0 - not at all  Feeling down, depressed, or hopeless:  0 - not at all  PHQ-2 Score:  0           Current Outpatient Medications   Medication Sig Dispense Refill    atorvastatin (LIPITOR) 10 mg tablet Take 1 tablet (10 mg total) by mouth daily at bedtime 30 tablet 0    buPROPion (WELLBUTRIN SR) 150 mg 12 hr tablet Take 1 tablet (150 mg total) by mouth 2 (two) times a day 180 tablet 3    ibuprofen (MOTRIN) 600 mg tablet Take 1 tablet (600 mg total) by mouth every 6 (six) hours as needed for mild pain or moderate pain 90 tablet 1    metFORMIN (GLUCOPHAGE) 500 mg tablet Take 1 tablet (500 mg total) by mouth daily with dinner 30 tablet 0    predniSONE 20 mg tablet 20mg PO twice daily x 5 days then 20mg PO daily x 5 days 15 tablet 0     No current facility-administered medications for this visit  No Known Allergies    There is no immunization history on file for this patient  Patient Care Team:  Millie Sanchez as PCP - General (Family Medicine)    Review of Systems   Constitutional: Positive for fatigue  Negative for activity change, diaphoresis and fever     HENT: Negative for congestion, facial swelling, hearing loss, rhinorrhea, sinus pressure, sinus pain, sneezing, sore throat and voice change  Eyes: Negative for discharge and visual disturbance  Respiratory: Negative for cough, choking, chest tightness, shortness of breath, wheezing and stridor  Cardiovascular: Negative for chest pain, palpitations and leg swelling  Gastrointestinal: Negative for abdominal distention, abdominal pain, constipation, diarrhea, nausea and vomiting  Endocrine: Negative for polydipsia, polyphagia and polyuria  Genitourinary: Negative for difficulty urinating, dysuria, frequency and urgency  Musculoskeletal: Negative for arthralgias, back pain, gait problem, joint swelling, myalgias, neck pain and neck stiffness  Skin: Negative for color change, rash and wound  Neurological: Negative for dizziness, syncope, speech difficulty, weakness, light-headedness and headaches  Hematological: Negative for adenopathy  Does not bruise/bleed easily  Psychiatric/Behavioral: Negative for agitation, behavioral problems, confusion, hallucinations, sleep disturbance and suicidal ideas  The patient is not nervous/anxious  Physical Exam :  Physical Exam  Vitals signs and nursing note reviewed  Constitutional:       General: He is not in acute distress  Appearance: He is well-developed  He is not diaphoretic  Neck:      Musculoskeletal: Normal range of motion and neck supple  Thyroid: No thyromegaly  Trachea: No tracheal deviation  Cardiovascular:      Rate and Rhythm: Normal rate and regular rhythm  Heart sounds: Normal heart sounds, S1 normal and S2 normal  No murmur  Comments: Negative carotid bruit b/l  Pulmonary:      Effort: Pulmonary effort is normal  No respiratory distress  Breath sounds: Wheezing present  Musculoskeletal: Normal range of motion  General: No tenderness or deformity  Skin:     General: Skin is warm and dry  Neurological:      Mental Status: He is alert and oriented to person, place, and time     Psychiatric:         Mood and Affect: Mood normal          Speech: Speech normal          Behavior: Behavior normal          Thought Content: Thought content normal          Judgment: Judgment normal            Reviewed Updated [facility] Prior Wellness Visits:   Last Health Maintenance visit information was reviewed, patient interviewed , no change since last HM visit no  Last HM visit information was reviewed, patient interviewed and updates made to the record today no    Assessment and Plan:  1  Annual physical exam     2  Encounter for hepatitis C screening test for low risk patient  Hepatitis C antibody   3  Encounter for tobacco use cessation counseling  buPROPion (WELLBUTRIN SR) 150 mg 12 hr tablet   4  Prediabetes  HEMOGLOBIN A1C W/ EAG ESTIMATION    Comprehensive metabolic panel   5  Mixed hyperlipidemia  Lipid panel   6  Hypertension, unspecified type  Comprehensive metabolic panel   7  Colon cancer screening  Cologuastephanie   8   Class 1 obesity due to excess calories with serious comorbidity and body mass index (BMI) of 33 0 to 33 9 in adult         Health Maintenance Due   Topic Date Due    Hepatitis C Screening  1960    Pneumococcal Vaccine: Pediatrics (0 to 5 Years) and At-Risk Patients (6 to 59 Years) (1 of 1 - PPSV23) 04/22/1967    HIV Screening  04/22/1976    DTaP,Tdap,and Td Vaccines (1 - Tdap) 04/22/1982    Annual Physical  11/08/2019    Influenza Vaccine  07/01/2020    Depression Screening PHQ  10/18/2020    BMI: Followup Plan  01/10/2021

## 2020-09-08 NOTE — PATIENT INSTRUCTIONS
Get bloodwork done after 12/8/2020- fast for 8-10 hours  Continue same medications  Wellbutrin SR twice daily as directed for smoking cessation  Medcheck/lab review in 4 months or sooner if needed    Meal Planning with Diabetes Exchanges   AMBULATORY CARE:   Diabetes exchanges  are servings of food that contain similar amounts of carbohydrate, fat, protein, and calories within a food group  The exchanges can be used to develop a healthy meal plan that helps to keep your blood sugar within the recommended levels  A meal plan with the right amount of carbohydrates is especially important  Your blood sugar naturally rises after you eat carbohydrates  Too many carbohydrates in 1 meal or snack can raise your blood sugar level  Carbohydrates are found in starches, fruit, milk, yogurt, and sweets  How to create a meal plan with exchanges:  A dietitian will work with you to develop a healthy meal plan that is right for you  This meal plan will include the amount of exchanges you can have from each food group throughout the day  Follow your meal plan by keeping track of the amount of exchanges you eat for each meal and snack  Your meal plan will be based on your age, weight, blood sugar levels, medicine, and activity level  Starch food group exchanges:  Each exchange below contains about 15 grams of carbohydrate , 3 grams of protein, 1 gram of fat, and 80 calories  · 1 ounce of white, whole wheat or rye bread (1 slice)    · 1 ounce of bagel (about ¼ of a bagel)    · 1 6-inch flour or corn tortilla or 1 4-inch pancake (about ¼ inch thick)    · ?  cup of cooked pasta or rice    · ¾ cup of dry, ready-to-eat cereal with no sugar added     · ½ cup of cooked cereal, such as oatmeal    · 3 dylon cracker squares or 8 animal crackers    · 6 saltine-type crackers or     · 3 cups of popcorn or ¾ ounce of pretzels     · Starchy vegetables and cooked legumes:      ¨ ½ cup of corn, green peas, sweet potatoes, or mashed potatoes ¨ ¼ of a large baked potato     ¨ 1 cup of acorn, butternut squash, or pumpkin     ¨ ½ cup of beans, lentils, or peas (such as harrington, kidney, or black-eyed)    ¨ ? cup of lima beans  Fruit group exchanges:  Each exchange contains about 15 grams of carbohydrate  and 60 calories  · 1 small (4 ounce) apple, banana orange, or nectarine    · ½ cup of canned or fresh fruit    · ½ cup (4 ounces) of unsweetened fruit juice    · 2 tablespoons of dried fruit  Milk group exchanges:  Each exchange contains about 12 grams of carbohydrate  and 8 grams of protein  The amount of fat and calories in each serving depends on the type of milk (such as whole, low-fat, or fat-free)  · 1 cup fat-free or low-fat milk    · ¾ cup of plain, nonfat yogurt    · 1 cup fat-free, flavored yogurt with artificial (no calorie) sweetener  Non-starchy vegetable group exchanges:  Each exchange contains about 5 grams of carbohydrate , 2 grams of protein, and 25 calories  Examples include beets, broccoli, cabbage, carrots, cauliflower, cucumber, mushrooms, tomatoes, and zucchini  · ½ cup of cooked vegetables or 1 cup of raw vegetables     · ½ cup of vegetable juice  Meat and meat substitute group exchanges:  Each exchange of a lean meat  listed below contains about 7 grams of protein, 0 to 3 grams of fat, and 45 calories  The meat and meat substitutes food group does not contain any carbohydrates  Medium and high-fat meats have more calories  · 1 ounce of chicken or turkey without skin, or 1 ounce of fish (not breaded or fried)     · 1 ounce of lean beef, pork, or lamb     · 1-inch cube or 1 ounce of low-fat cheese     · 2 egg whites or ¼ cup of egg substitute     · ½ cup of tofu  Sweets, desserts, and other carbohydrate group exchanges:   · Sweets and other desserts:  Each exchange has about 15 grams of carbohydrate       ¨ 1 ounce of mildred food cake or 2-inch square cake (unfrosted)    ¨ 2 small cookies     ¨ ½ cup of sugar-free, fat-free ice cream    ¨ 1 tablespoon of syrup, jam, jelly, table sugar, or honey    · Combination foods:     ¨ 1 cup of an entrée, such as lasagna, spaghetti with meatballs, macaroni and cheese, and chili with beans (each serving counts as 2 carbohydrate exchanges )     ¨ 1 cup of tomato or vegetable beef soup (each serving counts as 1 carbohydrate exchange )  Fat group exchanges:  Each exchange contains 5 grams of fat and 45 calories  · 1 teaspoon of oil (such as canola, olive, or corn oil)     · 6 almonds or cashews, 10 peanuts, or 4 pecan halves     · 2 tablespoons of avocado     · ½ tablespoon of peanut butter     · 1 teaspoon of regular margarine or 2 teaspoons of low-fat margarine     · 1 teaspoon of regular butter or 1 tablespoon of low-fat butter     · 1 teaspoon of regular mayonnaise or 1 tablespoon of low-fat mayonnaise     · 1 tablespoon of regular salad dressing or 2 tablespoons of low-fat salad dressing  Free foods: The foods on this list are called free foods because they have very few calories  Free foods usually do not increase your blood sugar if you limit them  · 1 tablespoon of catsup or taco sauce     · ¼ cup of salsa     · 2 tablespoons of sugar-free syrup or 2 teaspoons of light jam or jelly     · 1 tablespoon of fat-free salad dressing     · 4 tablespoons of fat-free margarine or fat-free mayonnaise     · Sugar-free drinks: diet soda, sugar-free drink mixes, or mineral water     · Low-sodium bouillon or fat-free broth     · Mustard     · Seasonings such as spices, herbs, and garlic     · Sugar-free gelatin without added fruit  Other healthy nutrition guidelines:   · Eat more fiber  Choose foods that are good sources of fiber, such as fruits, vegetables, and whole grains  Cereals that contain 5 or more grams of fiber per serving are good sources of fiber  Legumes such as garbanzo, harrington beans, kidney beans, and lentils are also good sources  · Limit fat    Ask your dietitian or healthcare provider how much fat you should eat each day  Choose foods low in fat, saturated fat, trans fat, and cholesterol  Examples include turkey or chicken without the skin, fish, lean cuts of meat, and beans  Low-fat dairy foods, such as low-fat or fat-free milk and low-fat yogurt are also good choices  Omega-3 fatty acids are healthy fats that are found in canola oil, soybean oil and fatty fish  [place], albacore tuna, and sardines are good sources of omega 3 fatty acids  Eat 2 servings of these types of fish each week  Do not eat fried fish  · Limit sugar  Sugar and sweets must be counted toward the carbohydrate exchanges that you can have within your meal plan  Limit sugar and sweets because they are usually also high in calories and fat  Eat smaller portions of sweets by sharing a dessert or asking for a child-size portion at a restaurant  · Limit sodium  (salt) to about 2,300 mg per day  You may need to eat even less sodium if you have certain medical conditions  Foods high in sodium include soy sauce, potato chips, and soup  · Limit alcohol  Ask your healthcare provider if it is safe for you to drink alcohol  If alcohol is safe for you to have, eat a meal when you drink alcohol  If you drink alcohol on an empty stomach, your blood sugar may drop to a low level  Women should limit alcohol to 1 drink per day  Men should limit alcohol to 2 drinks per day  A drink of alcohol is 5 ounces of wine, 12 ounces of beer, or 1½ ounces of liquor  Other ways to manage your diabetes:   · Control your blood sugar level  Test your blood sugar level regularly and keep a record of the results  Ask your healthcare provider when and how often to test your blood sugar  You may need to check your blood sugar level at least 3 times each day  · Talk to your healthcare provider about your weight  Ask if you need to lose weight, and how much you need to lose  If you are overweight, you may need to make other changes to lose weight   Ask your healthcare provider to help you create a weight loss program      · Exercise  can help to control your blood sugar levels and decrease your risk of heart disease  It can also help you lose or maintain your weight  Get at least 30 minutes of exercise, 5 times each week  Do resistance training (using weights) 2 times each week  Do not sit for longer than 90 minutes  Work with your healthcare provider to plan the best exercise program for you  Contact your healthcare provider if:   · You have high blood sugar levels during a certain time of day, or almost all of the time  · You often have low blood sugar levels  · You have questions or concerns about your condition or care  © 2017 [facility] Information is for End User's use only and may not be sold, redistributed or otherwise used for commercial purposes  All illustrations and images included in CareNotes® are the copyrighted property of A D A M , Inc  or Gerry Estrada  The above information is an  only  It is not intended as medical advice for individual conditions or treatments  Talk to your doctor, nurse or pharmacist before following any medical regimen to see if it is safe and effective for you

## 2020-09-22 ENCOUNTER — OFFICE VISIT (OUTPATIENT)
Dept: FAMILY MEDICINE CLINIC | Facility: CLINIC | Age: 60
End: 2020-09-22
Payer: COMMERCIAL

## 2020-09-22 VITALS
OXYGEN SATURATION: 97 % | DIASTOLIC BLOOD PRESSURE: 88 MMHG | HEART RATE: 84 BPM | TEMPERATURE: 97.9 F | BODY MASS INDEX: 33.64 KG/M2 | RESPIRATION RATE: 18 BRPM | WEIGHT: 235 LBS | HEIGHT: 70 IN | SYSTOLIC BLOOD PRESSURE: 148 MMHG

## 2020-09-22 DIAGNOSIS — Z72.0 TOBACCO ABUSE: Primary | ICD-10-CM

## 2020-09-22 DIAGNOSIS — R03.0 ELEVATED BP WITHOUT DIAGNOSIS OF HYPERTENSION: ICD-10-CM

## 2020-09-22 PROCEDURE — 99214 OFFICE O/P EST MOD 30 MIN: CPT | Performed by: NURSE PRACTITIONER

## 2020-09-22 NOTE — PROGRESS NOTES
[address] [place] [address] Primary Care        NAME: Josie Farnsworth is a 62 y o  male  : 1960    MRN: [id number]  DATE: 2020  TIME: 2:31 PM    Assessment and Plan   Tobacco abuse [Z72 0]  1  Tobacco abuse     2  Elevated BP without diagnosis of hypertension       1  Tobacco abuse  Tobacco cessation discussed  Continue wellbutrin    2  Elevated BP without diagnosis of hypertension  Recheck BP at nurse visit next week  Follow up with PCP if elevated next week  Sora[place] completed  Patient Instructions     Patient Instructions     Give Wellbutrin a few more weeks to start working  Make an active effort to stop smoking  Follow up next week for a nurse visit BP check  If your BP is elevated at the nurse visit, you should schedule a follow up visit with [facility] before January  Cigarette Smoking and Your Health   AMBULATORY CARE:   Risks to your health if you smoke:  Nicotine and other chemicals found in tobacco damage every cell in your body  Even if you are a light smoker, you have an increased risk for cancer, heart disease, and lung disease  If you are pregnant or have diabetes, smoking increases your risk for complications  Benefits to your health if you stop smoking:   · You decrease respiratory symptoms such as coughing, wheezing, and shortness of breath  · You reduce your risk for cancers of the lung, mouth, throat, kidney, bladder, pancreas, stomach, and cervix  If you already have cancer, you increase the benefits of chemotherapy  You also reduce your risk for cancer returning or a second cancer from developing  · You reduce your risk for heart disease, blood clots, heart attack, and stroke  · You reduce your risk for lung infections, and diseases such as pneumonia, asthma, chronic bronchitis, and emphysema  · Your circulation improves  More oxygen can be delivered to your body   If you have diabetes, you lower your risk for complications, such as kidney, artery, and eye diseases  You also lower your risk for nerve damage  Nerve damage can lead to amputations, poor vision, and blindness  · You improve your body's ability to heal and to fight infections  Benefits to the health of others if you stop smoking:  Tobacco is harmful to nonsmokers who breathe in your secondhand smoke  The following are ways the health of others around you may improve when you stop smoking:  · You lower the risks for lung cancer and heart disease in nonsmoking adults  · If you are pregnant, you lower the risk for miscarriage, early delivery, low birth weight, and stillbirth  You also lower your baby's risk for SIDS, obesity, developmental delay, and neurobehavioral problems, such as ADHD  · If you have children, you lower their risk for ear infections, colds, pneumonia, bronchitis, and asthma  For more information and support to stop smoking:   · Smokefree  gov  Phone: 8- 738 - 626-6912  Web Address: www [employer]  Follow up with your healthcare provider as directed:  Write down your questions so you remember to ask them during your visits  © 2017 [facility] Information is for End User's use only and may not be sold, redistributed or otherwise used for commercial purposes  All illustrations and images included in CareNotes® are the copyrighted property of [employer] M [employer]  or Gerry Estrada  The above information is an  only  It is not intended as medical advice for individual conditions or treatments  Talk to your doctor, nurse or pharmacist before following any medical regimen to see if it is safe and effective for you  Chief Complaint     Chief Complaint   Patient presents with    Follow-up     Pt reports he feels "hung over" from smoking cigarettes, doesn't feel that wellbutrin is helping  History of Present Illness       Patient here for follow up visit for smoking   Reports that when he smokes a bunch of cigarette then he feel sick and hangover, reports "I smoked about 6 this morning "  Patient reports he hasn't smoked any cigarettes in the last 3 hours  Elevated BP today, rechecked in office with BP of 148/88  No history of HTN  Checks his BP with his fathers cuff  Brought in cologuard test with him today  Review of Systems   Review of Systems   Constitutional: Negative for activity change, appetite change, chills, fatigue and fever  HENT: Negative for congestion, ear pain, nosebleeds, rhinorrhea and sore throat  Eyes: Negative for photophobia, pain, redness and visual disturbance  Respiratory: Negative for cough, shortness of breath and wheezing  Cardiovascular: Negative  Negative for chest pain  Gastrointestinal: Negative  Negative for abdominal pain, constipation, diarrhea and vomiting  Endocrine: Negative  Genitourinary: Negative for difficulty urinating, dysuria and flank pain  Musculoskeletal: Negative  Skin: Negative for color change and rash  Neurological: Negative for dizziness, weakness, numbness and headaches  Hematological: Negative for adenopathy  Psychiatric/Behavioral: Negative for agitation and confusion  The patient is not nervous/anxious          PHQ-9 Depression Screening    PHQ-9:    Frequency of the following problems over the past two weeks:       Little interest or pleasure in doing things:  0 - not at all  Feeling down, depressed, or hopeless:  0 - not at all  PHQ-2 Score:  0        Current Medications       Current Outpatient Medications:     atorvastatin (LIPITOR) 10 mg tablet, Take 1 tablet (10 mg total) by mouth daily at bedtime, Disp: 30 tablet, Rfl: 0    buPROPion (WELLBUTRIN SR) 150 mg 12 hr tablet, Take 1 tablet (150 mg total) by mouth 2 (two) times a day, Disp: 180 tablet, Rfl: 3    ibuprofen (MOTRIN) 600 mg tablet, Take 1 tablet (600 mg total) by mouth every 6 (six) hours as needed for mild pain or moderate pain, Disp: 90 tablet, Rfl: 1    metFORMIN (GLUCOPHAGE) 500 mg tablet, Take 1 tablet (500 mg total) by mouth daily with dinner, Disp: 30 tablet, Rfl: 0    Current Allergies     Allergies as of 09/22/2020    (No Known Allergies)            The following portions of the patient's history were reviewed and updated as appropriate: allergies, current medications, past family history, past medical history, past social history, past surgical history and problem list      Past Medical History:   Diagnosis Date    Gout     Osteoarthritis        History reviewed  No pertinent surgical history  Family History   Problem Relation Age of Onset    Arthritis Mother     Coronary artery disease Father          Medications have been verified  Objective   /88   Pulse 84   Temp 97 9 °F (36 6 °C) (Temporal)   Resp 18   Ht 5' 10" (1 778 m)   Wt 107 kg (235 lb)   SpO2 97%   BMI 33 72 kg/m²        Physical Exam     Physical Exam  Vitals signs and nursing note reviewed  Constitutional:       General: He is not in acute distress  Appearance: Normal appearance  He is well-developed  He is not ill-appearing  Eyes:      General: Lids are normal    Cardiovascular:      Rate and Rhythm: Normal rate and regular rhythm  Heart sounds: Normal heart sounds, S1 normal and S2 normal  No murmur  No friction rub  No gallop  Pulmonary:      Effort: Pulmonary effort is normal  No respiratory distress  Breath sounds: Normal breath sounds  No decreased breath sounds or wheezing  Musculoskeletal: Normal range of motion  General: No tenderness or deformity  Skin:     General: Skin is warm  Findings: No erythema or rash  Neurological:      Mental Status: He is alert and oriented to person, place, and time  Psychiatric:         Behavior: Behavior normal  Behavior is cooperative  Thought Content:  Thought content normal

## 2020-09-22 NOTE — PATIENT INSTRUCTIONS
Give Wellbutrin a few more weeks to start working  Make an active effort to stop smoking  Follow up next week for a nurse visit BP check  If your BP is elevated at the nurse visit, you should schedule a follow up visit with [facility] before January  Cigarette Smoking and Your Health   AMBULATORY CARE:   Risks to your health if you smoke:  Nicotine and other chemicals found in tobacco damage every cell in your body  Even if you are a light smoker, you have an increased risk for cancer, heart disease, and lung disease  If you are pregnant or have diabetes, smoking increases your risk for complications  Benefits to your health if you stop smoking:   · You decrease respiratory symptoms such as coughing, wheezing, and shortness of breath  · You reduce your risk for cancers of the lung, mouth, throat, kidney, bladder, pancreas, stomach, and cervix  If you already have cancer, you increase the benefits of chemotherapy  You also reduce your risk for cancer returning or a second cancer from developing  · You reduce your risk for heart disease, blood clots, heart attack, and stroke  · You reduce your risk for lung infections, and diseases such as pneumonia, asthma, chronic bronchitis, and emphysema  · Your circulation improves  More oxygen can be delivered to your body  If you have diabetes, you lower your risk for complications, such as kidney, artery, and eye diseases  You also lower your risk for nerve damage  Nerve damage can lead to amputations, poor vision, and blindness  · You improve your body's ability to heal and to fight infections  Benefits to the health of others if you stop smoking:  Tobacco is harmful to nonsmokers who breathe in your secondhand smoke  The following are ways the health of others around you may improve when you stop smoking:  · You lower the risks for lung cancer and heart disease in nonsmoking adults       · If you are pregnant, you lower the risk for miscarriage, early delivery, low birth weight, and stillbirth  You also lower your baby's risk for SIDS, obesity, developmental delay, and neurobehavioral problems, such as ADHD  · If you have children, you lower their risk for ear infections, colds, pneumonia, bronchitis, and asthma  For more information and support to stop smoking:   · Smokefree  gov  Phone: 7- 973 - 477-4423  Web Address: www [place]  Follow up with your healthcare provider as directed:  Write down your questions so you remember to ask them during your visits  © 2017 [address] [employer] Information is for End User's use only and may not be sold, redistributed or otherwise used for commercial purposes  All illustrations and images included in CareNotes® are the copyrighted property of [employer]  or Gerry Estrada  The above information is an  only  It is not intended as medical advice for individual conditions or treatments  Talk to your doctor, nurse or pharmacist before following any medical regimen to see if it is safe and effective for you

## 2020-09-29 ENCOUNTER — OFFICE VISIT (OUTPATIENT)
Dept: FAMILY MEDICINE CLINIC | Facility: CLINIC | Age: 60
End: 2020-09-29
Payer: COMMERCIAL

## 2020-09-29 VITALS — DIASTOLIC BLOOD PRESSURE: 100 MMHG | SYSTOLIC BLOOD PRESSURE: 140 MMHG

## 2020-09-29 DIAGNOSIS — R73.03 PREDIABETES: ICD-10-CM

## 2020-09-29 DIAGNOSIS — I10 ESSENTIAL HYPERTENSION: Primary | ICD-10-CM

## 2020-09-29 DIAGNOSIS — E78.2 MIXED HYPERLIPIDEMIA: ICD-10-CM

## 2020-09-29 PROCEDURE — 99211 OFF/OP EST MAY X REQ PHY/QHP: CPT | Performed by: INTERNAL MEDICINE

## 2020-09-29 PROCEDURE — 4004F PT TOBACCO SCREEN RCVD TLK: CPT | Performed by: INTERNAL MEDICINE

## 2020-09-29 PROCEDURE — 3080F DIAST BP >= 90 MM HG: CPT | Performed by: INTERNAL MEDICINE

## 2020-09-29 RX ORDER — ATORVASTATIN CALCIUM 10 MG/1
10 TABLET, FILM COATED ORAL
Qty: 30 TABLET | Refills: 3 | Status: SHIPPED | OUTPATIENT
Start: 2020-09-29 | End: 2021-01-20 | Stop reason: SDUPTHER

## 2020-09-29 RX ORDER — AMLODIPINE BESYLATE 5 MG/1
5 TABLET ORAL DAILY
Qty: 30 TABLET | Refills: 3 | Status: SHIPPED | OUTPATIENT
Start: 2020-09-29 | End: 2020-11-10

## 2020-10-22 ENCOUNTER — OFFICE VISIT (OUTPATIENT)
Dept: FAMILY MEDICINE CLINIC | Facility: CLINIC | Age: 60
End: 2020-10-22
Payer: COMMERCIAL

## 2020-10-22 VITALS
WEIGHT: 232.2 LBS | DIASTOLIC BLOOD PRESSURE: 88 MMHG | OXYGEN SATURATION: 98 % | TEMPERATURE: 98.6 F | HEART RATE: 89 BPM | HEIGHT: 70 IN | SYSTOLIC BLOOD PRESSURE: 134 MMHG | RESPIRATION RATE: 18 BRPM | BODY MASS INDEX: 33.24 KG/M2

## 2020-10-22 DIAGNOSIS — F33.0 MILD EPISODE OF RECURRENT MAJOR DEPRESSIVE DISORDER (HCC): ICD-10-CM

## 2020-10-22 DIAGNOSIS — I10 ESSENTIAL HYPERTENSION: Primary | ICD-10-CM

## 2020-10-22 DIAGNOSIS — Z71.6 ENCOUNTER FOR TOBACCO USE CESSATION COUNSELING: ICD-10-CM

## 2020-10-22 PROCEDURE — 99214 OFFICE O/P EST MOD 30 MIN: CPT | Performed by: NURSE PRACTITIONER

## 2020-10-22 RX ORDER — BUPROPION HYDROCHLORIDE 200 MG/1
200 TABLET, EXTENDED RELEASE ORAL 2 TIMES DAILY
Qty: 180 TABLET | Refills: 1 | Status: SHIPPED | OUTPATIENT
Start: 2020-10-22 | End: 2021-01-20 | Stop reason: SDUPTHER

## 2020-10-26 DIAGNOSIS — R11.0 NAUSEA: Primary | ICD-10-CM

## 2020-10-26 RX ORDER — ONDANSETRON 4 MG/1
4 TABLET, FILM COATED ORAL EVERY 8 HOURS PRN
Qty: 20 TABLET | Refills: 0 | Status: SHIPPED | OUTPATIENT
Start: 2020-10-26 | End: 2020-11-16 | Stop reason: SDUPTHER

## 2020-11-05 ENCOUNTER — OFFICE VISIT (OUTPATIENT)
Dept: FAMILY MEDICINE CLINIC | Facility: CLINIC | Age: 60
End: 2020-11-05
Payer: COMMERCIAL

## 2020-11-05 VITALS
TEMPERATURE: 97.3 F | WEIGHT: 227.4 LBS | RESPIRATION RATE: 20 BRPM | DIASTOLIC BLOOD PRESSURE: 92 MMHG | OXYGEN SATURATION: 99 % | HEIGHT: 70 IN | HEART RATE: 81 BPM | SYSTOLIC BLOOD PRESSURE: 146 MMHG | BODY MASS INDEX: 32.56 KG/M2

## 2020-11-05 DIAGNOSIS — L03.113 CELLULITIS OF RIGHT HAND: ICD-10-CM

## 2020-11-05 DIAGNOSIS — M10.041 ACUTE IDIOPATHIC GOUT OF RIGHT HAND: Primary | ICD-10-CM

## 2020-11-05 PROCEDURE — 4004F PT TOBACCO SCREEN RCVD TLK: CPT | Performed by: NURSE PRACTITIONER

## 2020-11-05 PROCEDURE — 99214 OFFICE O/P EST MOD 30 MIN: CPT | Performed by: NURSE PRACTITIONER

## 2020-11-05 RX ORDER — CEPHALEXIN 500 MG/1
500 CAPSULE ORAL EVERY 8 HOURS SCHEDULED
Qty: 30 CAPSULE | Refills: 0 | Status: SHIPPED | OUTPATIENT
Start: 2020-11-05 | End: 2020-11-10

## 2020-11-05 RX ORDER — PREDNISONE 20 MG/1
20 TABLET ORAL 2 TIMES DAILY WITH MEALS
Qty: 10 TABLET | Refills: 0 | Status: SHIPPED | OUTPATIENT
Start: 2020-11-05 | End: 2020-11-10

## 2020-11-10 ENCOUNTER — OFFICE VISIT (OUTPATIENT)
Dept: FAMILY MEDICINE CLINIC | Facility: CLINIC | Age: 60
End: 2020-11-10
Payer: COMMERCIAL

## 2020-11-10 VITALS
SYSTOLIC BLOOD PRESSURE: 144 MMHG | HEART RATE: 88 BPM | DIASTOLIC BLOOD PRESSURE: 88 MMHG | OXYGEN SATURATION: 98 % | BODY MASS INDEX: 32.81 KG/M2 | TEMPERATURE: 97.5 F | RESPIRATION RATE: 20 BRPM | WEIGHT: 229.2 LBS | HEIGHT: 70 IN

## 2020-11-10 DIAGNOSIS — I10 ESSENTIAL HYPERTENSION: Primary | ICD-10-CM

## 2020-11-10 DIAGNOSIS — M79.641 RIGHT HAND PAIN: ICD-10-CM

## 2020-11-10 PROCEDURE — 99213 OFFICE O/P EST LOW 20 MIN: CPT | Performed by: NURSE PRACTITIONER

## 2020-11-10 RX ORDER — AMLODIPINE BESYLATE 10 MG/1
10 TABLET ORAL DAILY
Qty: 90 TABLET | Refills: 3 | Status: SHIPPED | OUTPATIENT
Start: 2020-11-10 | End: 2021-05-11

## 2020-11-16 DIAGNOSIS — R11.0 NAUSEA: ICD-10-CM

## 2020-11-16 RX ORDER — ONDANSETRON 4 MG/1
4 TABLET, FILM COATED ORAL EVERY 8 HOURS PRN
Qty: 20 TABLET | Refills: 0 | Status: SHIPPED | OUTPATIENT
Start: 2020-11-16 | End: 2021-04-23 | Stop reason: SDUPTHER

## 2020-11-24 ENCOUNTER — OFFICE VISIT (OUTPATIENT)
Dept: FAMILY MEDICINE CLINIC | Facility: CLINIC | Age: 60
End: 2020-11-24
Payer: COMMERCIAL

## 2020-11-24 VITALS
HEART RATE: 95 BPM | TEMPERATURE: 96.7 F | SYSTOLIC BLOOD PRESSURE: 138 MMHG | DIASTOLIC BLOOD PRESSURE: 84 MMHG | HEIGHT: 70 IN | OXYGEN SATURATION: 99 % | BODY MASS INDEX: 33.3 KG/M2 | RESPIRATION RATE: 20 BRPM | WEIGHT: 232.6 LBS

## 2020-11-24 DIAGNOSIS — I10 ESSENTIAL HYPERTENSION: Primary | ICD-10-CM

## 2020-11-24 DIAGNOSIS — E78.00 HYPERCHOLESTEREMIA: ICD-10-CM

## 2020-11-24 PROCEDURE — 99213 OFFICE O/P EST LOW 20 MIN: CPT | Performed by: NURSE PRACTITIONER

## 2020-11-24 PROCEDURE — 3008F BODY MASS INDEX DOCD: CPT | Performed by: NURSE PRACTITIONER

## 2020-12-10 DIAGNOSIS — M10.9 GOUT, UNSPECIFIED CAUSE, UNSPECIFIED CHRONICITY, UNSPECIFIED SITE: Primary | ICD-10-CM

## 2020-12-11 RX ORDER — ALLOPURINOL 100 MG/1
100 TABLET ORAL DAILY
Qty: 30 TABLET | Refills: 5 | Status: SHIPPED | OUTPATIENT
Start: 2020-12-11 | End: 2021-05-11

## 2020-12-11 RX ORDER — PREDNISONE 10 MG/1
10 TABLET ORAL 2 TIMES DAILY WITH MEALS
Qty: 10 TABLET | Refills: 0 | Status: SHIPPED | OUTPATIENT
Start: 2020-12-11 | End: 2021-01-08

## 2021-01-06 ENCOUNTER — LAB (OUTPATIENT)
Dept: LAB | Facility: CLINIC | Age: 61
End: 2021-01-06
Payer: COMMERCIAL

## 2021-01-06 DIAGNOSIS — Z11.59 ENCOUNTER FOR HEPATITIS C SCREENING TEST FOR LOW RISK PATIENT: ICD-10-CM

## 2021-01-06 DIAGNOSIS — R73.03 PREDIABETES: ICD-10-CM

## 2021-01-06 DIAGNOSIS — I10 HYPERTENSION, UNSPECIFIED TYPE: ICD-10-CM

## 2021-01-06 DIAGNOSIS — E78.2 MIXED HYPERLIPIDEMIA: ICD-10-CM

## 2021-01-06 LAB
ALBUMIN SERPL BCP-MCNC: 3.7 G/DL (ref 3.5–5)
ALP SERPL-CCNC: 100 U/L (ref 46–116)
ALT SERPL W P-5'-P-CCNC: 24 U/L (ref 12–78)
ANION GAP SERPL CALCULATED.3IONS-SCNC: 5 MMOL/L (ref 4–13)
AST SERPL W P-5'-P-CCNC: 15 U/L (ref 5–45)
BILIRUB SERPL-MCNC: 0.37 MG/DL (ref 0.2–1)
BUN SERPL-MCNC: 13 MG/DL (ref 5–25)
CALCIUM SERPL-MCNC: 9.2 MG/DL (ref 8.3–10.1)
CHLORIDE SERPL-SCNC: 105 MMOL/L (ref 100–108)
CHOLEST SERPL-MCNC: 217 MG/DL (ref 50–200)
CO2 SERPL-SCNC: 26 MMOL/L (ref 21–32)
CREAT SERPL-MCNC: 1.28 MG/DL (ref 0.6–1.3)
EST. AVERAGE GLUCOSE BLD GHB EST-MCNC: 126 MG/DL
GFR SERPL CREATININE-BSD FRML MDRD: 61 ML/MIN/1.73SQ M
GLUCOSE P FAST SERPL-MCNC: 123 MG/DL (ref 65–99)
HBA1C MFR BLD: 6 %
HCV AB SER QL: NORMAL
HDLC SERPL-MCNC: 40 MG/DL
LDLC SERPL CALC-MCNC: 151 MG/DL (ref 0–100)
NONHDLC SERPL-MCNC: 177 MG/DL
POTASSIUM SERPL-SCNC: 4 MMOL/L (ref 3.5–5.3)
PROT SERPL-MCNC: 8.4 G/DL (ref 6.4–8.2)
SODIUM SERPL-SCNC: 136 MMOL/L (ref 136–145)
TRIGL SERPL-MCNC: 129 MG/DL

## 2021-01-06 PROCEDURE — 3044F HG A1C LEVEL LT 7.0%: CPT | Performed by: NURSE PRACTITIONER

## 2021-01-06 PROCEDURE — 80053 COMPREHEN METABOLIC PANEL: CPT

## 2021-01-06 PROCEDURE — 86803 HEPATITIS C AB TEST: CPT

## 2021-01-06 PROCEDURE — 36415 COLL VENOUS BLD VENIPUNCTURE: CPT

## 2021-01-06 PROCEDURE — 83036 HEMOGLOBIN GLYCOSYLATED A1C: CPT

## 2021-01-06 PROCEDURE — 80061 LIPID PANEL: CPT

## 2021-01-08 ENCOUNTER — OFFICE VISIT (OUTPATIENT)
Dept: FAMILY MEDICINE CLINIC | Facility: CLINIC | Age: 61
End: 2021-01-08
Payer: COMMERCIAL

## 2021-01-08 VITALS
WEIGHT: 238 LBS | RESPIRATION RATE: 20 BRPM | HEIGHT: 70 IN | HEART RATE: 88 BPM | TEMPERATURE: 98.8 F | DIASTOLIC BLOOD PRESSURE: 88 MMHG | BODY MASS INDEX: 34.07 KG/M2 | SYSTOLIC BLOOD PRESSURE: 138 MMHG | OXYGEN SATURATION: 99 %

## 2021-01-08 DIAGNOSIS — E11.9 CONTROLLED TYPE 2 DIABETES MELLITUS WITHOUT COMPLICATION, WITHOUT LONG-TERM CURRENT USE OF INSULIN (HCC): Primary | ICD-10-CM

## 2021-01-08 DIAGNOSIS — M1A.09X0 CHRONIC GOUT OF MULTIPLE SITES, UNSPECIFIED CAUSE: ICD-10-CM

## 2021-01-08 DIAGNOSIS — E11.9 DIABETIC EYE EXAM (HCC): ICD-10-CM

## 2021-01-08 DIAGNOSIS — I10 ESSENTIAL HYPERTENSION: ICD-10-CM

## 2021-01-08 DIAGNOSIS — E66.09 CLASS 1 OBESITY DUE TO EXCESS CALORIES WITH SERIOUS COMORBIDITY AND BODY MASS INDEX (BMI) OF 34.0 TO 34.9 IN ADULT: ICD-10-CM

## 2021-01-08 DIAGNOSIS — E78.00 HYPERCHOLESTEREMIA: ICD-10-CM

## 2021-01-08 DIAGNOSIS — Z01.00 DIABETIC EYE EXAM (HCC): ICD-10-CM

## 2021-01-08 DIAGNOSIS — E11.9 ENCOUNTER FOR DIABETIC FOOT EXAM (HCC): ICD-10-CM

## 2021-01-08 LAB
CREAT UR-MCNC: 97.3 MG/DL
MICROALBUMIN UR-MCNC: 5.6 MG/L (ref 0–20)
MICROALBUMIN/CREAT 24H UR: 6 MG/G CREATININE (ref 0–30)

## 2021-01-08 PROCEDURE — 3075F SYST BP GE 130 - 139MM HG: CPT | Performed by: NURSE PRACTITIONER

## 2021-01-08 PROCEDURE — 3061F NEG MICROALBUMINURIA REV: CPT | Performed by: NURSE PRACTITIONER

## 2021-01-08 PROCEDURE — 3725F SCREEN DEPRESSION PERFORMED: CPT | Performed by: NURSE PRACTITIONER

## 2021-01-08 PROCEDURE — 4004F PT TOBACCO SCREEN RCVD TLK: CPT | Performed by: NURSE PRACTITIONER

## 2021-01-08 PROCEDURE — 99214 OFFICE O/P EST MOD 30 MIN: CPT | Performed by: NURSE PRACTITIONER

## 2021-01-08 PROCEDURE — 3008F BODY MASS INDEX DOCD: CPT | Performed by: NURSE PRACTITIONER

## 2021-01-08 PROCEDURE — 3079F DIAST BP 80-89 MM HG: CPT | Performed by: NURSE PRACTITIONER

## 2021-01-08 PROCEDURE — 82570 ASSAY OF URINE CREATININE: CPT | Performed by: NURSE PRACTITIONER

## 2021-01-08 PROCEDURE — 82043 UR ALBUMIN QUANTITATIVE: CPT | Performed by: NURSE PRACTITIONER

## 2021-01-08 NOTE — PATIENT INSTRUCTIONS
Get bloodwork done in 3 months after 4/8/21  Return here for 4 month medcheck and lab review or call sooner for problems/concerns  Continue medications as directed, continue to avoid sugar and carbohydrates  Continue smoking cessation and decreasing amount of cigarettes per day  Get diabetic eye exam as scheduled 2/2/21 at 2:00pm

## 2021-01-08 NOTE — PROGRESS NOTES
[address] [place] [address] Primary Care        NAME: Guanaco Sanabria is a 62 y o  male  : 1960    MRN: [id number]  DATE: 2021  TIME: 10:12 AM    Assessment and Plan   Controlled type 2 diabetes mellitus without complication, without long-term current use of insulin (HCC) [E11 9]  1  Controlled type 2 diabetes mellitus without complication, without long-term current use of insulin (HCC)  Microalbumin / creatinine urine ratio    HEMOGLOBIN A1C W/ EAG ESTIMATION    Comprehensive metabolic panel   2  Diabetic eye exam [facility])  Ambulatory referral to Optometry   3  Encounter for diabetic foot exam ([facility] [address] )     4  Hypercholesteremia  Lipid panel   5  Essential hypertension     6  Chronic gout of multiple sites, unspecified cause  Uric acid   7   Class 1 obesity due to excess calories with serious comorbidity and body mass index (BMI) of 34 0 to 34 9 in adult           Patient Instructions     Patient Instructions   Get bloodwork done in 3 months after 21  Return here for 4 month medcheck and lab review or call sooner for problems/concerns  Continue medications as directed, continue to avoid sugar and carbohydrates  Continue smoking cessation and decreasing amount of cigarettes per day  Get diabetic eye exam as scheduled 21 at 2:00pm          Chief Complaint     Chief Complaint   Patient presents with    Follow-up     4 month follow up labs         History of Present Illness       Here for medcheck and lab review-    DM- is taking Metformin 500mg with dinner, Hga1c is 6 0  HTN- is taking Amlodipine 10mg daily, BP today 138/88  High Cholesterol- is taking his Atorvastatin 10mg, Lipids are improved, Triglycerides are much better  Gout- is not taking Allopurinol, will repeat uric acid level with next labs  Tobacco Use- has decreased amount with Wellbutrin- takes it twice a day- his sleep has improved not getting up to smoke all night- is happy with the results      Review of Systems   Review of Systems Constitutional: Negative for activity change, diaphoresis, fatigue and fever  HENT: Negative for congestion, facial swelling, hearing loss, rhinorrhea, sinus pressure, sinus pain, sneezing, sore throat and voice change  Eyes: Negative for discharge and visual disturbance  Respiratory: Negative for cough, choking, chest tightness, shortness of breath, wheezing and stridor  Cardiovascular: Negative for chest pain, palpitations and leg swelling  Gastrointestinal: Negative for abdominal distention, abdominal pain, constipation, diarrhea, nausea and vomiting  Endocrine: Negative for polydipsia, polyphagia and polyuria  Genitourinary: Negative for difficulty urinating, dysuria, frequency and urgency  Musculoskeletal: Negative for arthralgias, back pain, gait problem, joint swelling, myalgias, neck pain and neck stiffness  Skin: Negative for color change, rash and wound  Neurological: Negative for dizziness, syncope, speech difficulty, weakness, light-headedness and headaches  Hematological: Negative for adenopathy  Does not bruise/bleed easily  Psychiatric/Behavioral: Negative for agitation, behavioral problems, confusion, hallucinations, sleep disturbance and suicidal ideas  The patient is not nervous/anxious            Current Medications       Current Outpatient Medications:     amLODIPine (NORVASC) 10 mg tablet, Take 1 tablet (10 mg total) by mouth daily, Disp: 90 tablet, Rfl: 3    atorvastatin (LIPITOR) 10 mg tablet, Take 1 tablet (10 mg total) by mouth daily at bedtime, Disp: 30 tablet, Rfl: 3    buPROPion (WELLBUTRIN SR) 200 MG 12 hr tablet, Take 1 tablet (200 mg total) by mouth 2 (two) times a day, Disp: 180 tablet, Rfl: 1    metFORMIN (GLUCOPHAGE) 500 mg tablet, Take 1 tablet (500 mg total) by mouth daily with dinner, Disp: 30 tablet, Rfl: 3    ondansetron (ZOFRAN) 4 mg tablet, Take 1 tablet (4 mg total) by mouth every 8 (eight) hours as needed for nausea or vomiting, Disp: 20 tablet, Rfl: 0    allopurinol (ZYLOPRIM) 100 mg tablet, Take 1 tablet (100 mg total) by mouth daily (Patient not taking: Reported on 1/8/2021), Disp: 30 tablet, Rfl: 5    Current Allergies     Allergies as of 01/08/2021    (No Known Allergies)            The following portions of the patient's history were reviewed and updated as appropriate: allergies, current medications, past family history, past medical history, past social history, past surgical history and problem list      Past Medical History:   Diagnosis Date    Gout     Osteoarthritis        No past surgical history on file  Family History   Problem Relation Age of Onset    Arthritis Mother     Coronary artery disease Father          Medications have been verified  BMI Counseling: Body mass index is 34 15 kg/m²  The BMI is above normal  Nutrition recommendations include decreasing portion sizes, encouraging healthy choices of fruits and vegetables, decreasing fast food intake, consuming healthier snacks, limiting drinks that contain sugar, moderation in carbohydrate intake and increasing intake of lean protein  Objective   /88   Pulse 88   Temp 98 8 °F (37 1 °C)   Resp 20   Ht 5' 10" (1 778 m)   Wt 108 kg (238 lb)   SpO2 99%   BMI 34 15 kg/m²        Physical Exam     Physical Exam  Vitals signs and nursing note reviewed  Constitutional:       General: He is not in acute distress  Appearance: Normal appearance  He is well-developed  He is obese  He is not diaphoretic  Neck:      Musculoskeletal: Normal range of motion and neck supple  Thyroid: No thyromegaly  Trachea: No tracheal deviation  Cardiovascular:      Rate and Rhythm: Normal rate and regular rhythm  Pulses: no weak pulses          Dorsalis pedis pulses are 2+ on the right side and 2+ on the left side  Heart sounds: Normal heart sounds  No murmur  Pulmonary:      Effort: Pulmonary effort is normal  No respiratory distress  Breath sounds: Normal breath sounds  No wheezing  Musculoskeletal: Normal range of motion  General: No tenderness or deformity  Right lower leg: No edema  Left lower leg: No edema  Feet:      Right foot:      Skin integrity: Dry skin present  No ulcer, skin breakdown, erythema, warmth or callus  Left foot:      Skin integrity: Dry skin present  No ulcer, skin breakdown, erythema, warmth or callus  Skin:     General: Skin is warm and dry  Neurological:      Mental Status: He is alert and oriented to person, place, and time  Psychiatric:         Mood and Affect: Mood normal          Speech: Speech normal          Behavior: Behavior normal          Thought Content: Thought content normal          Judgment: Judgment normal            Patient's shoes and socks removed  Right Foot/Ankle   Right Foot Inspection  Skin Exam: skin normal, skin intact and dry skin no warmth, no callus, no erythema, no maceration, no abnormal color, no pre-ulcer, no ulcer and no callus                          Toe Exam: ROM and strength within normal limits  Sensory       Monofilament testing: intact  Vascular  Capillary refills: < 3 seconds  The right DP pulse is 2+  Left Foot/Ankle  Left Foot Inspection  Skin Exam: skin normal, skin intact and dry skinno warmth, no erythema, no maceration, normal color, no pre-ulcer, no ulcer and no callus                         Toe Exam: ROM and strength within normal limits                   Sensory       Monofilament: intact  Vascular  Capillary refills: < 3 seconds  The left DP pulse is 2+  Assign Risk Category:  No deformity present; No loss of protective sensation;  No weak pulses       Risk: 0

## 2021-01-20 DIAGNOSIS — R73.03 PREDIABETES: ICD-10-CM

## 2021-01-20 DIAGNOSIS — Z71.6 ENCOUNTER FOR TOBACCO USE CESSATION COUNSELING: ICD-10-CM

## 2021-01-20 DIAGNOSIS — E78.2 MIXED HYPERLIPIDEMIA: ICD-10-CM

## 2021-01-20 NOTE — TELEPHONE ENCOUNTER
Received call from patient requesting refills of Wellburtin, metformin and atorvastatin to be sent to [facility]  Please advise

## 2021-01-21 RX ORDER — ATORVASTATIN CALCIUM 10 MG/1
10 TABLET, FILM COATED ORAL
Qty: 30 TABLET | Refills: 3 | Status: SHIPPED | OUTPATIENT
Start: 2021-01-21 | End: 2021-05-11 | Stop reason: SDUPTHER

## 2021-01-21 RX ORDER — BUPROPION HYDROCHLORIDE 200 MG/1
200 TABLET, EXTENDED RELEASE ORAL 2 TIMES DAILY
Qty: 180 TABLET | Refills: 1 | Status: SHIPPED | OUTPATIENT
Start: 2021-01-21 | End: 2021-05-11 | Stop reason: SDUPTHER

## 2021-03-09 LAB
LEFT EYE DIABETIC RETINOPATHY: NORMAL
RIGHT EYE DIABETIC RETINOPATHY: NORMAL

## 2021-04-08 ENCOUNTER — APPOINTMENT (OUTPATIENT)
Dept: LAB | Facility: CLINIC | Age: 61
End: 2021-04-08
Payer: COMMERCIAL

## 2021-04-08 DIAGNOSIS — E11.9 CONTROLLED TYPE 2 DIABETES MELLITUS WITHOUT COMPLICATION, WITHOUT LONG-TERM CURRENT USE OF INSULIN (HCC): ICD-10-CM

## 2021-04-08 DIAGNOSIS — M1A.09X0 CHRONIC GOUT OF MULTIPLE SITES, UNSPECIFIED CAUSE: ICD-10-CM

## 2021-04-08 DIAGNOSIS — E78.00 HYPERCHOLESTEREMIA: ICD-10-CM

## 2021-04-08 LAB
ALBUMIN SERPL BCP-MCNC: 3.8 G/DL (ref 3.5–5)
ALP SERPL-CCNC: 85 U/L (ref 46–116)
ALT SERPL W P-5'-P-CCNC: 25 U/L (ref 12–78)
ANION GAP SERPL CALCULATED.3IONS-SCNC: 8 MMOL/L (ref 4–13)
AST SERPL W P-5'-P-CCNC: 17 U/L (ref 5–45)
BILIRUB SERPL-MCNC: 0.32 MG/DL (ref 0.2–1)
BUN SERPL-MCNC: 15 MG/DL (ref 5–25)
CALCIUM SERPL-MCNC: 8.7 MG/DL (ref 8.3–10.1)
CHLORIDE SERPL-SCNC: 108 MMOL/L (ref 100–108)
CHOLEST SERPL-MCNC: 164 MG/DL (ref 50–200)
CO2 SERPL-SCNC: 21 MMOL/L (ref 21–32)
CREAT SERPL-MCNC: 1.2 MG/DL (ref 0.6–1.3)
EST. AVERAGE GLUCOSE BLD GHB EST-MCNC: 120 MG/DL
GFR SERPL CREATININE-BSD FRML MDRD: 66 ML/MIN/1.73SQ M
GLUCOSE P FAST SERPL-MCNC: 112 MG/DL (ref 65–99)
HBA1C MFR BLD: 5.8 %
HDLC SERPL-MCNC: 48 MG/DL
LDLC SERPL CALC-MCNC: 103 MG/DL (ref 0–100)
NONHDLC SERPL-MCNC: 116 MG/DL
POTASSIUM SERPL-SCNC: 4.3 MMOL/L (ref 3.5–5.3)
PROT SERPL-MCNC: 7.9 G/DL (ref 6.4–8.2)
SODIUM SERPL-SCNC: 137 MMOL/L (ref 136–145)
TRIGL SERPL-MCNC: 66 MG/DL
URATE SERPL-MCNC: 5.9 MG/DL (ref 4.2–8)

## 2021-04-08 PROCEDURE — 83036 HEMOGLOBIN GLYCOSYLATED A1C: CPT

## 2021-04-08 PROCEDURE — 80061 LIPID PANEL: CPT

## 2021-04-08 PROCEDURE — 80053 COMPREHEN METABOLIC PANEL: CPT

## 2021-04-08 PROCEDURE — 84550 ASSAY OF BLOOD/URIC ACID: CPT

## 2021-04-08 PROCEDURE — 36415 COLL VENOUS BLD VENIPUNCTURE: CPT

## 2021-04-23 DIAGNOSIS — R11.0 NAUSEA: ICD-10-CM

## 2021-04-23 RX ORDER — ONDANSETRON 4 MG/1
4 TABLET, FILM COATED ORAL EVERY 8 HOURS PRN
Qty: 20 TABLET | Refills: 0 | Status: SHIPPED | OUTPATIENT
Start: 2021-04-23 | End: 2021-08-31 | Stop reason: SDUPTHER

## 2021-04-23 NOTE — TELEPHONE ENCOUNTER
Pt needs refill on his ondansetron takes 4 mg every 8 hrs prn # [place]      Please advise    [facility]:[phone]

## 2021-05-11 ENCOUNTER — OFFICE VISIT (OUTPATIENT)
Dept: FAMILY MEDICINE CLINIC | Facility: CLINIC | Age: 61
End: 2021-05-11
Payer: COMMERCIAL

## 2021-05-11 VITALS
TEMPERATURE: 98.5 F | HEART RATE: 86 BPM | HEIGHT: 70 IN | DIASTOLIC BLOOD PRESSURE: 80 MMHG | SYSTOLIC BLOOD PRESSURE: 146 MMHG | BODY MASS INDEX: 30.58 KG/M2 | RESPIRATION RATE: 20 BRPM | WEIGHT: 213.6 LBS | OXYGEN SATURATION: 98 %

## 2021-05-11 DIAGNOSIS — Z71.6 ENCOUNTER FOR TOBACCO USE CESSATION COUNSELING: ICD-10-CM

## 2021-05-11 DIAGNOSIS — I10 ESSENTIAL HYPERTENSION: ICD-10-CM

## 2021-05-11 DIAGNOSIS — R73.03 PREDIABETES: ICD-10-CM

## 2021-05-11 DIAGNOSIS — M1A.09X0 CHRONIC GOUT OF MULTIPLE SITES, UNSPECIFIED CAUSE: Primary | ICD-10-CM

## 2021-05-11 DIAGNOSIS — E78.2 MIXED HYPERLIPIDEMIA: ICD-10-CM

## 2021-05-11 PROCEDURE — 3008F BODY MASS INDEX DOCD: CPT | Performed by: NURSE PRACTITIONER

## 2021-05-11 PROCEDURE — 3079F DIAST BP 80-89 MM HG: CPT | Performed by: NURSE PRACTITIONER

## 2021-05-11 PROCEDURE — 4004F PT TOBACCO SCREEN RCVD TLK: CPT | Performed by: NURSE PRACTITIONER

## 2021-05-11 PROCEDURE — 3725F SCREEN DEPRESSION PERFORMED: CPT | Performed by: NURSE PRACTITIONER

## 2021-05-11 PROCEDURE — 3077F SYST BP >= 140 MM HG: CPT | Performed by: NURSE PRACTITIONER

## 2021-05-11 PROCEDURE — 99214 OFFICE O/P EST MOD 30 MIN: CPT | Performed by: NURSE PRACTITIONER

## 2021-05-11 RX ORDER — ATORVASTATIN CALCIUM 10 MG/1
10 TABLET, FILM COATED ORAL
Qty: 90 TABLET | Refills: 3 | Status: SHIPPED | OUTPATIENT
Start: 2021-05-11 | End: 2022-02-21 | Stop reason: SDUPTHER

## 2021-05-11 RX ORDER — BUPROPION HYDROCHLORIDE 200 MG/1
200 TABLET, EXTENDED RELEASE ORAL 2 TIMES DAILY
Qty: 180 TABLET | Refills: 1 | Status: SHIPPED | OUTPATIENT
Start: 2021-05-11 | End: 2021-08-31

## 2021-05-11 RX ORDER — AMLODIPINE BESYLATE AND BENAZEPRIL HYDROCHLORIDE 10; 20 MG/1; MG/1
1 CAPSULE ORAL DAILY
Qty: 90 CAPSULE | Refills: 1 | Status: SHIPPED | OUTPATIENT
Start: 2021-05-11 | End: 2021-08-31

## 2021-05-11 NOTE — PATIENT INSTRUCTIONS
Get bloodwork before next office visit in August  Stop plain Amlodipine and start combination pill- Amlodipine/Benazopril  Continue all other medications  4 month medcheck or call sooner if any concerns

## 2021-05-11 NOTE — PROGRESS NOTES
[facility] Primary Care        NAME: Oletha Sandifer is a 62 y o  male  : 1960    MRN: [id number]  DATE: May 11, 2021  TIME: 12:01 PM    Assessment and Plan   Chronic gout of multiple sites, unspecified cause [M1A 09X0]  1  Chronic gout of multiple sites, unspecified cause  Uric acid    CBC and differential   2  Mixed hyperlipidemia  atorvastatin (LIPITOR) 10 mg tablet    Lipid panel   3  Encounter for tobacco use cessation counseling  buPROPion (WELLBUTRIN SR) 200 MG 12 hr tablet   4  Prediabetes  metFORMIN (GLUCOPHAGE) 500 mg tablet    HEMOGLOBIN A1C W/ EAG ESTIMATION   5  Essential hypertension  Comprehensive metabolic panel    CBC and differential    amLODIPine-benazepril (LOTREL) 10-20 MG per capsule         Patient Instructions     Patient Instructions   Get bloodwork before next office visit in August  Stop plain Amlodipine and start combination pill- Amlodipine/Benazopril  Continue all other medications  4 month medcheck or call sooner if any concerns          Chief Complaint     Chief Complaint   Patient presents with    Follow-up         History of Present Illness       Here today for f/u-   Wellbutrin- is down to 2ppd from 5ppd  Amlodipine- is taking as directed  Allopurinol- he reports this makes his knee pain worse so he stopped taking it  Metformin- is taking 500mg daily, last HgA1c is 5 8      Review of Systems   Review of Systems   Constitutional: Negative for activity change, diaphoresis, fatigue and fever  HENT: Negative for congestion, facial swelling, hearing loss, rhinorrhea, sinus pressure, sinus pain, sneezing, sore throat and voice change  Eyes: Negative for discharge and visual disturbance  Respiratory: Negative for cough, choking, chest tightness, shortness of breath, wheezing and stridor  Cardiovascular: Negative for chest pain, palpitations and leg swelling     Gastrointestinal: Negative for abdominal distention, abdominal pain, constipation, diarrhea, nausea and vomiting  Endocrine: Negative for polydipsia, polyphagia and polyuria  Genitourinary: Negative for difficulty urinating, dysuria, frequency and urgency  Musculoskeletal: Negative for arthralgias, back pain, gait problem, joint swelling, myalgias, neck pain and neck stiffness  Skin: Negative for color change, rash and wound  Neurological: Negative for dizziness, syncope, speech difficulty, weakness, light-headedness and headaches  Hematological: Negative for adenopathy  Does not bruise/bleed easily  Psychiatric/Behavioral: Negative for agitation, behavioral problems, confusion, hallucinations, sleep disturbance and suicidal ideas  The patient is not nervous/anxious  Current Medications       Current Outpatient Medications:     atorvastatin (LIPITOR) 10 mg tablet, Take 1 tablet (10 mg total) by mouth daily at bedtime, Disp: 90 tablet, Rfl: 3    buPROPion (WELLBUTRIN SR) 200 MG 12 hr tablet, Take 1 tablet (200 mg total) by mouth 2 (two) times a day, Disp: 180 tablet, Rfl: 1    metFORMIN (GLUCOPHAGE) 500 mg tablet, Take 1 tablet (500 mg total) by mouth daily with dinner, Disp: 90 tablet, Rfl: 3    ondansetron (ZOFRAN) 4 mg tablet, Take 1 tablet (4 mg total) by mouth every 8 (eight) hours as needed for nausea or vomiting, Disp: 20 tablet, Rfl: 0    amLODIPine-benazepril (LOTREL) 10-20 MG per capsule, Take 1 capsule by mouth daily, Disp: 90 capsule, Rfl: 1    Current Allergies     Allergies as of 05/11/2021    (No Known Allergies)            The following portions of the patient's history were reviewed and updated as appropriate: allergies, current medications, past family history, past medical history, past social history, past surgical history and problem list      Past Medical History:   Diagnosis Date    Gout     Osteoarthritis        History reviewed  No pertinent surgical history      Family History   Problem Relation Age of Onset    Arthritis Mother     Coronary artery disease Father Medications have been verified  Objective   /80   Pulse 86   Temp 98 5 °F (36 9 °C) (Tympanic)   Resp 20   Ht 5' 10" (1 778 m)   Wt 96 9 kg (213 lb 9 6 oz)   SpO2 98%   BMI 30 65 kg/m²        Physical Exam     Physical Exam  Vitals signs and nursing note reviewed  Constitutional:       General: He is not in acute distress  Appearance: Normal appearance  He is well-developed  He is not diaphoretic  Neck:      Musculoskeletal: Normal range of motion and neck supple  Thyroid: No thyromegaly  Trachea: No tracheal deviation  Cardiovascular:      Rate and Rhythm: Normal rate and regular rhythm  Heart sounds: Normal heart sounds  No murmur  Pulmonary:      Effort: Pulmonary effort is normal  No respiratory distress  Breath sounds: Normal breath sounds  No wheezing  Musculoskeletal: Normal range of motion  General: No tenderness or deformity  Skin:     General: Skin is warm and dry  Neurological:      General: No focal deficit present  Mental Status: He is alert and oriented to person, place, and time  Psychiatric:         Mood and Affect: Mood normal          Speech: Speech normal          Behavior: Behavior normal          Thought Content:  Thought content normal          Judgment: Judgment normal

## 2021-07-05 ENCOUNTER — NURSE TRIAGE (OUTPATIENT)
Dept: OTHER | Facility: OTHER | Age: 61
End: 2021-07-05

## 2021-07-05 NOTE — TELEPHONE ENCOUNTER
Regarding: Low blood pressure  ----- Message from Loco Lal sent at 7/5/2021  3:05 PM EDT -----  "I am on new blood pressure medication, and my blood pressure is really low    Should I stop taking it?"

## 2021-07-05 NOTE — TELEPHONE ENCOUNTER
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

## 2021-07-06 ENCOUNTER — TELEPHONE (OUTPATIENT)
Dept: FAMILY MEDICINE CLINIC | Facility: CLINIC | Age: 61
End: 2021-07-06

## 2021-07-06 NOTE — TELEPHONE ENCOUNTER
Please schedule him with available provider for both- he should bring a copy of his Bps from home and if he took his medication  Thanks!

## 2021-07-06 NOTE — TELEPHONE ENCOUNTER
He said yesterday his blood pressure was 90/60 pulse was 115 he said he was working outside   This morning it was 133/over 80 something, pulse was down to the 80's   He wants to know what he should do with his meds  Also want to get something to check his sugars

## 2021-07-06 NOTE — TELEPHONE ENCOUNTER
Called and spoke with pt   While trying to schedule phone cut out twice and connection was lost      Will try again later

## 2021-07-07 DIAGNOSIS — E11.9 CONTROLLED TYPE 2 DIABETES MELLITUS WITHOUT COMPLICATION, WITHOUT LONG-TERM CURRENT USE OF INSULIN (HCC): Primary | ICD-10-CM

## 2021-07-07 NOTE — TELEPHONE ENCOUNTER
Called and spoke with patient  He said he is feeling much better  His BP was 123/80 and pulse was 120  He thinks it was all from being in the heat  He does not want an appointment right now  He would like to get a glucometer  I will send refill request for one

## 2021-07-08 RX ORDER — LANCETS
EACH MISCELLANEOUS 2 TIMES DAILY
Qty: 100 EACH | Refills: 2 | Status: SHIPPED | OUTPATIENT
Start: 2021-07-08

## 2021-07-08 RX ORDER — LANCETS 28 GAUGE
EACH MISCELLANEOUS
Qty: 100 EACH | Refills: 3 | Status: SHIPPED | OUTPATIENT
Start: 2021-07-08

## 2021-07-08 RX ORDER — BLOOD-GLUCOSE METER
1 KIT MISCELLANEOUS
Qty: 1 EACH | Refills: 0 | Status: SHIPPED | OUTPATIENT
Start: 2021-07-08

## 2021-07-08 NOTE — TELEPHONE ENCOUNTER
Called and spoke with Ed at [facility] in regards to this  He actually stated that his plan will not cover Freestyle but will cover OneTouch Ultra/OneTouch Verio or Contour/Contour Next  Ed said they do have a free Contour Next meter that he can give to patient without script and would just need an order for microlet lancets, as the test strip order was sent in as a generic, so he can use that script  Okay to call in for contour next and microlet lancets? Please advise

## 2021-07-08 NOTE — TELEPHONE ENCOUNTER
Script for microlet lancets sent back for approval      Will call pharmacy and inform of change to glucometer brand

## 2021-08-30 ENCOUNTER — TELEPHONE (OUTPATIENT)
Dept: FAMILY MEDICINE CLINIC | Facility: CLINIC | Age: 61
End: 2021-08-30

## 2021-08-30 NOTE — TELEPHONE ENCOUNTER
Pt called and stated on message that his Knee is swollen and feels it is the gout    He is asking for medication to be called in      Please advise    Phone: [phone]  Asking for a call back

## 2021-08-31 ENCOUNTER — APPOINTMENT (OUTPATIENT)
Dept: LAB | Facility: CLINIC | Age: 61
End: 2021-08-31
Payer: COMMERCIAL

## 2021-08-31 ENCOUNTER — OFFICE VISIT (OUTPATIENT)
Dept: FAMILY MEDICINE CLINIC | Facility: CLINIC | Age: 61
End: 2021-08-31
Payer: COMMERCIAL

## 2021-08-31 ENCOUNTER — APPOINTMENT (OUTPATIENT)
Dept: RADIOLOGY | Facility: CLINIC | Age: 61
End: 2021-08-31
Payer: COMMERCIAL

## 2021-08-31 VITALS
OXYGEN SATURATION: 98 % | HEIGHT: 70 IN | HEART RATE: 86 BPM | RESPIRATION RATE: 20 BRPM | DIASTOLIC BLOOD PRESSURE: 76 MMHG | SYSTOLIC BLOOD PRESSURE: 136 MMHG | WEIGHT: 214 LBS | TEMPERATURE: 99.9 F | BODY MASS INDEX: 30.64 KG/M2

## 2021-08-31 DIAGNOSIS — E11.9 CONTROLLED TYPE 2 DIABETES MELLITUS WITHOUT COMPLICATION, WITHOUT LONG-TERM CURRENT USE OF INSULIN (HCC): Primary | ICD-10-CM

## 2021-08-31 DIAGNOSIS — M25.561 ACUTE PAIN OF RIGHT KNEE: ICD-10-CM

## 2021-08-31 DIAGNOSIS — I10 ESSENTIAL HYPERTENSION: ICD-10-CM

## 2021-08-31 DIAGNOSIS — R73.03 PREDIABETES: ICD-10-CM

## 2021-08-31 DIAGNOSIS — E78.2 MIXED HYPERLIPIDEMIA: ICD-10-CM

## 2021-08-31 DIAGNOSIS — M1A.09X0 CHRONIC GOUT OF MULTIPLE SITES, UNSPECIFIED CAUSE: ICD-10-CM

## 2021-08-31 DIAGNOSIS — Z71.6 ENCOUNTER FOR TOBACCO USE CESSATION COUNSELING: ICD-10-CM

## 2021-08-31 DIAGNOSIS — R11.0 NAUSEA: ICD-10-CM

## 2021-08-31 LAB
ALBUMIN SERPL BCP-MCNC: 3.6 G/DL (ref 3.5–5)
ALP SERPL-CCNC: 75 U/L (ref 46–116)
ALT SERPL W P-5'-P-CCNC: 23 U/L (ref 12–78)
ANION GAP SERPL CALCULATED.3IONS-SCNC: 6 MMOL/L (ref 4–13)
AST SERPL W P-5'-P-CCNC: 15 U/L (ref 5–45)
BASOPHILS # BLD AUTO: 0.05 THOUSANDS/ΜL (ref 0–0.1)
BASOPHILS NFR BLD AUTO: 0 % (ref 0–1)
BILIRUB SERPL-MCNC: 0.3 MG/DL (ref 0.2–1)
BUN SERPL-MCNC: 14 MG/DL (ref 5–25)
CALCIUM SERPL-MCNC: 8.8 MG/DL (ref 8.3–10.1)
CHLORIDE SERPL-SCNC: 110 MMOL/L (ref 100–108)
CHOLEST SERPL-MCNC: 181 MG/DL (ref 50–200)
CO2 SERPL-SCNC: 22 MMOL/L (ref 21–32)
CREAT SERPL-MCNC: 0.83 MG/DL (ref 0.6–1.3)
EOSINOPHIL # BLD AUTO: 0.05 THOUSAND/ΜL (ref 0–0.61)
EOSINOPHIL NFR BLD AUTO: 0 % (ref 0–6)
ERYTHROCYTE [DISTWIDTH] IN BLOOD BY AUTOMATED COUNT: 13.4 % (ref 11.6–15.1)
EST. AVERAGE GLUCOSE BLD GHB EST-MCNC: 114 MG/DL
GFR SERPL CREATININE-BSD FRML MDRD: 96 ML/MIN/1.73SQ M
GLUCOSE P FAST SERPL-MCNC: 102 MG/DL (ref 65–99)
HBA1C MFR BLD: 5.6 %
HCT VFR BLD AUTO: 40.7 % (ref 36.5–49.3)
HDLC SERPL-MCNC: 48 MG/DL
HGB BLD-MCNC: 13.4 G/DL (ref 12–17)
IMM GRANULOCYTES # BLD AUTO: 0.05 THOUSAND/UL (ref 0–0.2)
IMM GRANULOCYTES NFR BLD AUTO: 0 % (ref 0–2)
LDLC SERPL CALC-MCNC: 122 MG/DL (ref 0–100)
LYMPHOCYTES # BLD AUTO: 2.31 THOUSANDS/ΜL (ref 0.6–4.47)
LYMPHOCYTES NFR BLD AUTO: 20 % (ref 14–44)
MCH RBC QN AUTO: 33 PG (ref 26.8–34.3)
MCHC RBC AUTO-ENTMCNC: 32.9 G/DL (ref 31.4–37.4)
MCV RBC AUTO: 100 FL (ref 82–98)
MONOCYTES # BLD AUTO: 0.59 THOUSAND/ΜL (ref 0.17–1.22)
MONOCYTES NFR BLD AUTO: 5 % (ref 4–12)
NEUTROPHILS # BLD AUTO: 8.8 THOUSANDS/ΜL (ref 1.85–7.62)
NEUTS SEG NFR BLD AUTO: 75 % (ref 43–75)
NONHDLC SERPL-MCNC: 133 MG/DL
NRBC BLD AUTO-RTO: 0 /100 WBCS
PLATELET # BLD AUTO: 292 THOUSANDS/UL (ref 149–390)
PMV BLD AUTO: 11.2 FL (ref 8.9–12.7)
POTASSIUM SERPL-SCNC: 4.3 MMOL/L (ref 3.5–5.3)
PROT SERPL-MCNC: 8.1 G/DL (ref 6.4–8.2)
RBC # BLD AUTO: 4.06 MILLION/UL (ref 3.88–5.62)
SODIUM SERPL-SCNC: 138 MMOL/L (ref 136–145)
TRIGL SERPL-MCNC: 54 MG/DL
URATE SERPL-MCNC: 5.8 MG/DL (ref 4.2–8)
WBC # BLD AUTO: 11.85 THOUSAND/UL (ref 4.31–10.16)

## 2021-08-31 PROCEDURE — 84550 ASSAY OF BLOOD/URIC ACID: CPT

## 2021-08-31 PROCEDURE — 73562 X-RAY EXAM OF KNEE 3: CPT

## 2021-08-31 PROCEDURE — 4004F PT TOBACCO SCREEN RCVD TLK: CPT | Performed by: NURSE PRACTITIONER

## 2021-08-31 PROCEDURE — 80061 LIPID PANEL: CPT

## 2021-08-31 PROCEDURE — 83036 HEMOGLOBIN GLYCOSYLATED A1C: CPT

## 2021-08-31 PROCEDURE — 3008F BODY MASS INDEX DOCD: CPT | Performed by: NURSE PRACTITIONER

## 2021-08-31 PROCEDURE — 99214 OFFICE O/P EST MOD 30 MIN: CPT | Performed by: NURSE PRACTITIONER

## 2021-08-31 PROCEDURE — 3078F DIAST BP <80 MM HG: CPT | Performed by: NURSE PRACTITIONER

## 2021-08-31 PROCEDURE — 80053 COMPREHEN METABOLIC PANEL: CPT

## 2021-08-31 PROCEDURE — 36415 COLL VENOUS BLD VENIPUNCTURE: CPT

## 2021-08-31 PROCEDURE — 3075F SYST BP GE 130 - 139MM HG: CPT | Performed by: NURSE PRACTITIONER

## 2021-08-31 PROCEDURE — 85025 COMPLETE CBC W/AUTO DIFF WBC: CPT

## 2021-08-31 PROCEDURE — 3044F HG A1C LEVEL LT 7.0%: CPT | Performed by: NURSE PRACTITIONER

## 2021-08-31 RX ORDER — AMLODIPINE BESYLATE AND BENAZEPRIL HYDROCHLORIDE 5; 10 MG/1; MG/1
1 CAPSULE ORAL DAILY
Qty: 90 CAPSULE | Refills: 3 | Status: SHIPPED | OUTPATIENT
Start: 2021-08-31 | End: 2022-01-11 | Stop reason: ALTCHOICE

## 2021-08-31 RX ORDER — ONDANSETRON 4 MG/1
4 TABLET, FILM COATED ORAL EVERY 8 HOURS PRN
Qty: 20 TABLET | Refills: 0 | Status: SHIPPED | OUTPATIENT
Start: 2021-08-31 | End: 2021-12-13

## 2021-08-31 RX ORDER — BUPROPION HYDROCHLORIDE 100 MG/1
100 TABLET ORAL 2 TIMES DAILY
Qty: 180 TABLET | Refills: 1 | Status: SHIPPED | OUTPATIENT
Start: 2021-08-31 | End: 2022-03-14

## 2021-08-31 NOTE — PATIENT INSTRUCTIONS
Xray right knee  Restart Wellbutrin half the dose- 100mg twice daily  Restart Amlodipine/Benazepril half the dose 5/10mg daily  Get labs done- will consider restarting Allopurinol and Metformin if needed based on results  Keep appointment in September for follow up  Call sooner for problems/concerns

## 2021-08-31 NOTE — PROGRESS NOTES
[address] [place] [address] Primary Care        NAME: Alem Stovall is a 62 y o  male  : 1960    MRN: [id number]  DATE: 2021  TIME: 10:50 AM    Assessment and Plan   Controlled type 2 diabetes mellitus without complication, without long-term current use of insulin ([facility]) [E11 9]  1  Controlled type 2 diabetes mellitus without complication, without long-term current use of insulin ([facility] [address] )     2  Encounter for tobacco use cessation counseling  buPROPion (WELLBUTRIN) 100 mg tablet   3  Acute pain of right knee  XR knee 3 vw right non injury   4  Essential hypertension  amLODIPine-benazepril (LOTREL 5-10) 5-10 MG per capsule         Patient Instructions     Patient Instructions   Xray right knee  Restart Wellbutrin half the dose- 100mg twice daily  Restart Amlodipine/Benazepril half the dose 5/10mg daily  Get labs done- will consider restarting Allopurinol and Metformin if needed based on results  Keep appointment in September for follow up  Call sooner for problems/concerns          Chief Complaint     Chief Complaint   Patient presents with    Right Knee Swelling     For one week,         History of Present Illness       Here for right knee pain- he stopped almost all of his medications 6 weeks ago  He felt like he was dizzy all the time and his blood sugars were sometimes in the 50s  He reports his sugar this morning was 115  He has not taking his Metformin for 6 weeks      Review of Systems   Review of Systems   Constitutional: Negative for activity change, diaphoresis, fatigue and fever  HENT: Negative for congestion, facial swelling, hearing loss, rhinorrhea, sinus pressure, sinus pain, sneezing, sore throat and voice change  Eyes: Negative for discharge and visual disturbance  Respiratory: Negative for cough, choking, chest tightness, shortness of breath, wheezing and stridor  Cardiovascular: Negative for chest pain, palpitations and leg swelling     Gastrointestinal: Negative for abdominal distention, abdominal pain, constipation, diarrhea, nausea and vomiting  Endocrine: Negative for polydipsia, polyphagia and polyuria  Genitourinary: Negative for difficulty urinating, dysuria, frequency and urgency  Musculoskeletal: Positive for arthralgias and joint swelling  Negative for back pain, gait problem, myalgias, neck pain and neck stiffness  Skin: Negative for color change, rash and wound  Neurological: Negative for dizziness, syncope, speech difficulty, weakness, light-headedness and headaches  Hematological: Negative for adenopathy  Does not bruise/bleed easily  Psychiatric/Behavioral: Negative for agitation, behavioral problems, confusion, hallucinations, sleep disturbance and suicidal ideas  The patient is not nervous/anxious            Current Medications       Current Outpatient Medications:     glucose blood test strip, Use as instructed, Disp: 100 each, Rfl: 3    glucose monitoring kit (FREESTYLE) monitoring kit, Use 1 each 2 (two) times a day before meals, Disp: 1 each, Rfl: 0    Lancets (freestyle) lancets, Use as instructed, Disp: 100 each, Rfl: 3    amLODIPine-benazepril (LOTREL 5-10) 5-10 MG per capsule, Take 1 capsule by mouth daily, Disp: 90 capsule, Rfl: 3    atorvastatin (LIPITOR) 10 mg tablet, Take 1 tablet (10 mg total) by mouth daily at bedtime (Patient not taking: Reported on 8/31/2021), Disp: 90 tablet, Rfl: 3    buPROPion (WELLBUTRIN) 100 mg tablet, Take 1 tablet (100 mg total) by mouth 2 (two) times a day, Disp: 180 tablet, Rfl: 1    metFORMIN (GLUCOPHAGE) 500 mg tablet, Take 1 tablet (500 mg total) by mouth daily with dinner (Patient not taking: Reported on 8/31/2021), Disp: 90 tablet, Rfl: 3    Microlet Lancets MISC, Use 2 (two) times a day (Patient not taking: Reported on 8/31/2021), Disp: 100 each, Rfl: 2    ondansetron (ZOFRAN) 4 mg tablet, Take 1 tablet (4 mg total) by mouth every 8 (eight) hours as needed for nausea or vomiting (Patient not taking: Reported on 8/31/2021), Disp: 20 tablet, Rfl: 0    Current Allergies     Allergies as of 08/31/2021    (No Known Allergies)            The following portions of the patient's history were reviewed and updated as appropriate: allergies, current medications, past family history, past medical history, past social history, past surgical history and problem list      Past Medical History:   Diagnosis Date    Gout     Osteoarthritis        History reviewed  No pertinent surgical history  Family History   Problem Relation Age of Onset    Arthritis Mother     Coronary artery disease Father          Medications have been verified  Objective   /76   Pulse 86   Temp 99 9 °F (37 7 °C) (Tympanic)   Resp 20   Ht 5' 10" (1 778 m)   Wt 97 1 kg (214 lb)   SpO2 98%   BMI 30 71 kg/m²        Physical Exam     Physical Exam  Vitals and nursing note reviewed  Constitutional:       General: He is not in acute distress  Appearance: He is well-developed  He is not diaphoretic  Neck:      Thyroid: No thyromegaly  Trachea: No tracheal deviation  Cardiovascular:      Rate and Rhythm: Normal rate and regular rhythm  Heart sounds: Normal heart sounds  No murmur heard  Pulmonary:      Effort: Pulmonary effort is normal  No respiratory distress  Breath sounds: Normal breath sounds  No wheezing  Musculoskeletal:         General: Swelling (right knee- mild) present  No tenderness (right knee is nontender in office) or deformity  Normal range of motion  Cervical back: Normal range of motion and neck supple  Skin:     General: Skin is warm and dry  Neurological:      Mental Status: He is alert and oriented to person, place, and time  Psychiatric:         Mood and Affect: Mood normal          Speech: Speech normal          Behavior: Behavior normal          Thought Content:  Thought content normal          Judgment: Judgment normal

## 2021-08-31 NOTE — TELEPHONE ENCOUNTER
Patient requesting refill(s) of: ondansetron 4 mg Q8H PRN    Last filled: 4/23/2021 #20 x 0  Last appt:8/31/2021  Next appt:9/20/2021  [facility]: [facility]

## 2021-08-31 NOTE — TELEPHONE ENCOUNTER
Pt called and needs a refill on his zofran 4 mg takes 1 Q 8 hrs   # 20    He stated he is still taking as needed    Please advise    [facility]:[phone]

## 2021-09-02 DIAGNOSIS — M25.561 ACUTE PAIN OF RIGHT KNEE: Primary | ICD-10-CM

## 2021-09-07 ENCOUNTER — TELEPHONE (OUTPATIENT)
Dept: FAMILY MEDICINE CLINIC | Facility: CLINIC | Age: 61
End: 2021-09-07

## 2021-09-07 DIAGNOSIS — M25.561 ACUTE PAIN OF RIGHT KNEE: Primary | ICD-10-CM

## 2021-09-07 NOTE — TELEPHONE ENCOUNTER
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

## 2021-09-07 NOTE — TELEPHONE ENCOUNTER
Called patient and he stated that he is experiencing pain in his right knee (8/10) and would like to have something sent over to [facility] patient does have a appt with Ortho ([facility]) on 9/23/21 he states swelling has gone down and pain does get better with movement but the pain still returns,

## 2021-09-08 RX ORDER — MELOXICAM 7.5 MG/1
7.5 TABLET ORAL DAILY
Qty: 30 TABLET | Refills: 0 | Status: SHIPPED | OUTPATIENT
Start: 2021-09-08 | End: 2022-02-01

## 2021-09-08 NOTE — TELEPHONE ENCOUNTER
Chart reviewed, kidney function looks good, can try Mobic which is a stronger NSAID  Can't take with any other NSAID (so no Aleve/ibuprofen/Advil/Naproxen), and can't take if he's had a history of GI bleeding  Please make sure of this, will send to pharmacy for him

## 2021-09-08 NOTE — TELEPHONE ENCOUNTER
Spoke with patient regarding recommendations as per provider  Patient verbalized understanding  No history of GI bleed  Is willing to try Mobic and is aware to not take other NSAIDS while taking it  Patient request script sent to [facility]

## 2021-09-13 ENCOUNTER — APPOINTMENT (EMERGENCY)
Dept: RADIOLOGY | Facility: HOSPITAL | Age: 61
End: 2021-09-13
Payer: COMMERCIAL

## 2021-09-13 ENCOUNTER — TELEPHONE (OUTPATIENT)
Dept: FAMILY MEDICINE CLINIC | Facility: CLINIC | Age: 61
End: 2021-09-13

## 2021-09-13 ENCOUNTER — HOSPITAL ENCOUNTER (EMERGENCY)
Facility: HOSPITAL | Age: 61
Discharge: HOME | End: 2021-09-13
Attending: EMERGENCY MEDICINE | Admitting: EMERGENCY MEDICINE
Payer: COMMERCIAL

## 2021-09-13 VITALS
HEART RATE: 90 BPM | DIASTOLIC BLOOD PRESSURE: 72 MMHG | TEMPERATURE: 100 F | OXYGEN SATURATION: 97 % | BODY MASS INDEX: 29.27 KG/M2 | WEIGHT: 204 LBS | RESPIRATION RATE: 18 BRPM | SYSTOLIC BLOOD PRESSURE: 127 MMHG

## 2021-09-13 DIAGNOSIS — M25.461 KNEE EFFUSION, RIGHT: Primary | ICD-10-CM

## 2021-09-13 PROCEDURE — 73564 X-RAY EXAM KNEE 4 OR MORE: CPT

## 2021-09-13 PROCEDURE — 99285 EMERGENCY DEPT VISIT HI MDM: CPT | Performed by: EMERGENCY MEDICINE

## 2021-09-13 PROCEDURE — 99283 EMERGENCY DEPT VISIT LOW MDM: CPT

## 2021-09-13 RX ORDER — COLCHICINE 0.6 MG/1
0.6 TABLET ORAL ONCE
Status: COMPLETED | OUTPATIENT
Start: 2021-09-13 | End: 2021-09-13

## 2021-09-13 RX ORDER — COLCHICINE 0.6 MG/1
1.2 TABLET ORAL ONCE
Status: COMPLETED | OUTPATIENT
Start: 2021-09-13 | End: 2021-09-13

## 2021-09-13 RX ADMIN — COLCHICINE 0.6 MG: 0.6 TABLET, FILM COATED ORAL at 16:48

## 2021-09-13 RX ADMIN — COLCHICINE 1.2 MG: 0.6 TABLET, FILM COATED ORAL at 16:02

## 2021-09-13 NOTE — ED PROVIDER NOTES
History  Chief Complaint   Patient presents with    Knee Swelling     Several days of right knee effusion, no symptomatic fever/chills, no n/v, no trauma  Able to range with discomfort, and to FWB  Has had gout in other joints  No PMH MDRO, IVDA, other recent infections  Sent here for therapeutic tap, as ortho told him it would be 2 weeks, but I have told him that with ortho there is option for therapeutic injection also  Prior to Admission Medications   Prescriptions Last Dose Informant Patient Reported? Taking? Lancets (freestyle) lancets   No No   Sig: Use as instructed   Microlet Lancets MISC   No No   Sig: Use 2 (two) times a day   Patient not taking: Reported on 8/31/2021   amLODIPine-benazepril (LOTREL 5-10) 5-10 MG per capsule   No No   Sig: Take 1 capsule by mouth daily   atorvastatin (LIPITOR) 10 mg tablet   No No   Sig: Take 1 tablet (10 mg total) by mouth daily at bedtime   Patient not taking: Reported on 8/31/2021   buPROPion (WELLBUTRIN) 100 mg tablet   No No   Sig: Take 1 tablet (100 mg total) by mouth 2 (two) times a day   glucose blood test strip   No No   Sig: Use as instructed   glucose monitoring kit (FREESTYLE) monitoring kit   No No   Sig: Use 1 each 2 (two) times a day before meals   meloxicam (MOBIC) 7 5 mg tablet   No No   Sig: Take 1 tablet (7 5 mg total) by mouth daily   metFORMIN (GLUCOPHAGE) 500 mg tablet   No No   Sig: Take 1 tablet (500 mg total) by mouth daily with dinner   Patient not taking: Reported on 8/31/2021   ondansetron (ZOFRAN) 4 mg tablet   No No   Sig: Take 1 tablet (4 mg total) by mouth every 8 (eight) hours as needed for nausea or vomiting      Facility-Administered Medications: None       Past Medical History:   Diagnosis Date    Gout     Osteoarthritis        History reviewed  No pertinent surgical history      Family History   Problem Relation Age of Onset    Arthritis Mother     Coronary artery disease Father      I have reviewed and agree with the history as documented  E-Cigarette/Vaping    E-Cigarette Use Never User      E-Cigarette/Vaping Substances     Social History     Tobacco Use    Smoking status: Current Every Day Smoker     Packs/day: 3 00     Years: 15 00     Pack years: 45 00     Types: Cigarettes    Smokeless tobacco: Never Used    Tobacco comment: not as much   Vaping Use    Vaping Use: Never used   Substance Use Topics    Alcohol use: Not Currently     Comment: occasionally    Drug use: No       Review of Systems   Constitutional: Negative for chills and fever  HENT: Negative for rhinorrhea  Eyes: Negative for visual disturbance  Respiratory: Negative for shortness of breath  Cardiovascular: Negative for chest pain  Gastrointestinal: Negative for abdominal pain, diarrhea, nausea and vomiting  Endocrine: Negative for polydipsia  Genitourinary: Negative for dysuria, frequency and hematuria  Musculoskeletal: Positive for joint swelling  Negative for neck stiffness  Skin: Negative for rash  Allergic/Immunologic: Negative for immunocompromised state  Neurological: Negative for speech difficulty, weakness and numbness  Psychiatric/Behavioral: Negative for suicidal ideas  Physical Exam  Physical Exam  Constitutional:       General: He is not in acute distress  HENT:      Head: Normocephalic and atraumatic  Mouth/Throat:      Pharynx: Oropharynx is clear  Eyes:      Conjunctiva/sclera: Conjunctivae normal    Cardiovascular:      Rate and Rhythm: Regular rhythm  Heart sounds: Normal heart sounds  Pulmonary:      Effort: Pulmonary effort is normal       Breath sounds: Normal breath sounds  Abdominal:      General: Bowel sounds are normal       Palpations: Abdomen is soft  There is no mass  Tenderness: There is no abdominal tenderness  There is no guarding  Musculoskeletal:         General: Swelling present  No tenderness  Cervical back: Normal range of motion and neck supple  Right lower leg: No edema  Left lower leg: No edema  Comments: Right knee ROM 45 deg with mild discomfort  Slightly warm but not hot  Large effusion  Stable, no deformity  NVI  Skin:     General: Skin is warm and dry  Neurological:      General: No focal deficit present  Mental Status: He is alert and oriented to person, place, and time  Psychiatric:         Mood and Affect: Mood normal          Vital Signs  ED Triage Vitals [09/13/21 1500]   Temperature Pulse Respirations Blood Pressure SpO2   100 °F (37 8 °C) 90 18 127/72 97 %      Temp Source Heart Rate Source Patient Position - Orthostatic VS BP Location FiO2 (%)   Tympanic Monitor Sitting Left arm --      Pain Score       5           Vitals:    09/13/21 1500   BP: 127/72   Pulse: 90   Patient Position - Orthostatic VS: Sitting         Visual Acuity      ED Medications  Medications   colchicine (COLCRYS) tablet 1 2 mg (1 2 mg Oral Given 9/13/21 1602)   colchicine (COLCRYS) tablet 0 6 mg (0 6 mg Oral Given 9/13/21 1648)       Diagnostic Studies  Results Reviewed     None                 XR knee 4+ vw right injury   ED Interpretation by Marlon Pedro MD (09/13 1624)   No fx/dl                 Procedures  Procedures         ED Course                                           MDM  Number of Diagnoses or Management Options  Diagnosis management comments: Knee effusion, not suggestive of septic joint, taking meloxicam, will try colchicine here in ed      Disposition  Final diagnoses:   Knee effusion, right     Time reflects when diagnosis was documented in both MDM as applicable and the Disposition within this note     Time User Action Codes Description Comment    9/13/2021  4:25 PM Rafi Alicia [facility] Knee effusion, right       ED Disposition     ED Disposition Condition Date/Time Comment    Discharge Stable Mon Sep 13, 2021  4:25 PM [address] discharge to home/self care              Follow-up Information     Follow up With Specialties Details Why [address], [address] [facility], Nurse Practitioner, Emergency Medicine Schedule an appointment as soon as possible for a visit in 2 days  [address] [facility]      Kofi Harris MD Orthopedic Surgery Schedule an appointment as soon as possible for a visit in 2 days  [phone] [facility]            Discharge Medication List as of 9/13/2021  4:46 PM      CONTINUE these medications which have NOT CHANGED    Details   amLODIPine-benazepril (LOTREL 5-10) 5-10 MG per capsule Take 1 capsule by mouth daily, Starting Tue 8/31/2021, Normal      atorvastatin (LIPITOR) 10 mg tablet Take 1 tablet (10 mg total) by mouth daily at bedtime, Starting Tue 5/11/2021, Normal      buPROPion (WELLBUTRIN) 100 mg tablet Take 1 tablet (100 mg total) by mouth 2 (two) times a day, Starting Tue 8/31/2021, Normal      glucose blood test strip Use as instructed, Normal      glucose monitoring kit (FREESTYLE) monitoring kit Use 1 each 2 (two) times a day before meals, Starting Thu 7/8/2021, Normal      !! Lancets (freestyle) lancets Use as instructed, Normal      meloxicam (MOBIC) 7 5 mg tablet Take 1 tablet (7 5 mg total) by mouth daily, Starting Wed 9/8/2021, Normal      metFORMIN (GLUCOPHAGE) 500 mg tablet Take 1 tablet (500 mg total) by mouth daily with dinner, Starting Tue 5/11/2021, Normal      !! Microlet Lancets MISC Use 2 (two) times a day, Starting Thu 7/8/2021, Normal      ondansetron (ZOFRAN) 4 mg tablet Take 1 tablet (4 mg total) by mouth every 8 (eight) hours as needed for nausea or vomiting, Starting Tue 8/31/2021, Normal       !! - Potential duplicate medications found  Please discuss with provider  No discharge procedures on file      PDMP Review     None          ED Provider  Electronically Signed by           Delos Goodell, MD  09/13/21 7706

## 2021-09-13 NOTE — TELEPHONE ENCOUNTER
Patient called due to Being seen a few weeks ago for a swollen knee and was given some medication  Patient stated the knee is still swollen and medication is not helping  Can't drive knee is to swollen to move from gas to brakes, wants to know what you suggest or more to recommend?  Please call Patient and let him know [phone]

## 2021-09-13 NOTE — TELEPHONE ENCOUNTER
He has an effusion that needs to be drained- his appointment with Ortho is 9/23- the only other place to get this done would be the ER- ask him if he agreeable to go to the ER to have this done?

## 2021-09-14 ENCOUNTER — OFFICE VISIT (OUTPATIENT)
Dept: OBGYN CLINIC | Facility: CLINIC | Age: 61
End: 2021-09-14
Payer: COMMERCIAL

## 2021-09-14 VITALS
HEIGHT: 70 IN | BODY MASS INDEX: 28.77 KG/M2 | SYSTOLIC BLOOD PRESSURE: 104 MMHG | HEART RATE: 83 BPM | WEIGHT: 201 LBS | DIASTOLIC BLOOD PRESSURE: 69 MMHG

## 2021-09-14 DIAGNOSIS — M25.461 EFFUSION OF RIGHT KNEE: Primary | ICD-10-CM

## 2021-09-14 LAB
APPEARANCE FLD: ABNORMAL
COLOR FLD: ABNORMAL
CRYSTALS SNV QL MICRO: NORMAL
GLUCOSE FLD-MCNC: 20 MG/DL
LYMPHOCYTES # SNV MANUAL: 18 %
MONOCYTES NFR SNV MANUAL: 1 %
NEUTROPHILS NFR SNV MANUAL: 80 %
NEUTS BAND NFR SNV: 1 %
SITE: ABNORMAL
TOTAL CELLS COUNTED SPEC: 100
WBC # FLD MANUAL: ABNORMAL /UL (ref 0–200)

## 2021-09-14 PROCEDURE — 89051 BODY FLUID CELL COUNT: CPT | Performed by: PHYSICIAN ASSISTANT

## 2021-09-14 PROCEDURE — 3074F SYST BP LT 130 MM HG: CPT | Performed by: ORTHOPAEDIC SURGERY

## 2021-09-14 PROCEDURE — 3078F DIAST BP <80 MM HG: CPT | Performed by: ORTHOPAEDIC SURGERY

## 2021-09-14 PROCEDURE — 87070 CULTURE OTHR SPECIMN AEROBIC: CPT | Performed by: PHYSICIAN ASSISTANT

## 2021-09-14 PROCEDURE — 99203 OFFICE O/P NEW LOW 30 MIN: CPT | Performed by: ORTHOPAEDIC SURGERY

## 2021-09-14 PROCEDURE — 20610 DRAIN/INJ JOINT/BURSA W/O US: CPT | Performed by: ORTHOPAEDIC SURGERY

## 2021-09-14 PROCEDURE — 82945 GLUCOSE OTHER FLUID: CPT | Performed by: ORTHOPAEDIC SURGERY

## 2021-09-14 PROCEDURE — 89060 EXAM SYNOVIAL FLUID CRYSTALS: CPT | Performed by: PHYSICIAN ASSISTANT

## 2021-09-14 PROCEDURE — 87205 SMEAR GRAM STAIN: CPT | Performed by: PHYSICIAN ASSISTANT

## 2021-09-14 NOTE — PROGRESS NOTES
Assessment/Plan:  Assessment/Plan   Diagnoses and all orders for this visit:    Effusion of right knee  -     Large joint arthrocentesis: R knee        Reviewed today's physical exam and x-ray findings with patient at time of visit  While has x-rays only demonstrate mild osteoarthritis, he does have a significant effusion  Successful aspiration of 46 cc of cloudy blood-tinged fluid was drawn from the knee  Patient experienced hot flashes, sweating, nausea, and moments of decreased responsiveness for approximately 5 minutes following the procedure  Aspirate is being sent to the lab for evaluation  the right knee was aspirated of 46 cc of cloudy/ blood tinged fluid  All the fluid was sent for culture and analysis  After the aspiration, the patient became diaphoretic  It is a possibility he may have sustained a vasovagal  Episode  He did feel better after the aspiration, but cautiously he was referred to the emergency room via EMS  Return back in 1 week to discuss results or sooner if there is any problems    Subjective:   Patient ID: Niesha Gordillo is a 62 y o  male  HPI    Patient presents today for evaluation of right knee effusion  Patient denies any specific incident of injury, but notes he experienced significant swelling, pain, and difficulty with weight-bearing activity approximately 2 weeks ago  He was seen as local [address] [place] [address] ED most recently on 9/13/2021  X-rays were taken and read as negative for any acute osseous abnormality  He was then referred to Orthopedics for further evaluation  On today's presentation he reports large knee swelling, stiffness, and pain with attempted motion  He denies any feelings of instability  Patient states that he spends a lot of time on his feet caring for his parents, and working  He denies any associated bruising, numbness, or tingling      The following portions of the patient's history were reviewed and updated as appropriate: allergies, current medications, past family history, past medical history, past social history, past surgical history and problem list     Past Medical History:   Diagnosis Date    Gout     Osteoarthritis      History reviewed  No pertinent surgical history  Family History   Problem Relation Age of Onset    Arthritis Mother     Coronary artery disease Father      Social History     Socioeconomic History    Marital status: Single     Spouse name: None    Number of children: None    Years of education: None    Highest education level: None   Occupational History    None   Tobacco Use    Smoking status: Current Every Day Smoker     Packs/day: 3 00     Years: 15 00     Pack years: 45 00     Types: Cigarettes    Smokeless tobacco: Never Used    Tobacco comment: not as much   Vaping Use    Vaping Use: Never used   Substance and Sexual Activity    Alcohol use: Not Currently     Comment: occasionally    Drug use: No    Sexual activity: None   Other Topics Concern    None   Social History Narrative    None     Social Determinants of Health     Financial Resource Strain:     Difficulty of Paying Living Expenses:    Food Insecurity:     Worried About Running Out of Food in the Last Year:     Ran Out of Food in the Last Year:    Transportation Needs:     Lack of Transportation (Medical):      Lack of Transportation (Non-Medical):    Physical Activity:     Days of Exercise per Week:     Minutes of Exercise per Session:    Stress:     Feeling of Stress :    Social Connections:     Frequency of Communication with Friends and Family:     Frequency of Social Gatherings with Friends and Family:     Attends Che[employer] Services:     Active Member of Clubs or Organizations:     Attends Club or Organization Meetings:     Marital Status:    Intimate Partner Violence:     Fear of Current or Ex-Partner:     Emotionally Abused:     Physically Abused:     Sexually Abused:        Current Outpatient Medications:    amLODIPine-benazepril (LOTREL 5-10) 5-10 MG per capsule, Take 1 capsule by mouth daily, Disp: 90 capsule, Rfl: 3    buPROPion (WELLBUTRIN) 100 mg tablet, Take 1 tablet (100 mg total) by mouth 2 (two) times a day, Disp: 180 tablet, Rfl: 1    glucose blood test strip, Use as instructed, Disp: 100 each, Rfl: 3    glucose monitoring kit (FREESTYLE) monitoring kit, Use 1 each 2 (two) times a day before meals, Disp: 1 each, Rfl: 0    Lancets (freestyle) lancets, Use as instructed, Disp: 100 each, Rfl: 3    meloxicam (MOBIC) 7 5 mg tablet, Take 1 tablet (7 5 mg total) by mouth daily, Disp: 30 tablet, Rfl: 0    ondansetron (ZOFRAN) 4 mg tablet, Take 1 tablet (4 mg total) by mouth every 8 (eight) hours as needed for nausea or vomiting, Disp: 20 tablet, Rfl: 0    atorvastatin (LIPITOR) 10 mg tablet, Take 1 tablet (10 mg total) by mouth daily at bedtime (Patient not taking: Reported on 8/31/2021), Disp: 90 tablet, Rfl: 3    metFORMIN (GLUCOPHAGE) 500 mg tablet, Take 1 tablet (500 mg total) by mouth daily with dinner (Patient not taking: Reported on 8/31/2021), Disp: 90 tablet, Rfl: 3    Microlet Lancets MISC, Use 2 (two) times a day (Patient not taking: Reported on 8/31/2021), Disp: 100 each, Rfl: 2  No current facility-administered medications for this visit  No Known Allergies      Review of Systems   Constitutional: Negative for chills, fever and unexpected weight change  HENT: Negative for hearing loss, nosebleeds and sore throat  Eyes: Negative for pain, redness and visual disturbance  Respiratory: Negative for cough, shortness of breath and wheezing  Cardiovascular: Negative for chest pain, palpitations and leg swelling  Gastrointestinal: Negative for abdominal pain, nausea and vomiting  Endocrine: Negative for polydipsia and polyuria  Genitourinary: Negative for dysuria and hematuria  Musculoskeletal:        As noted in HPI   Skin: Negative for rash and wound     Neurological: Negative for dizziness, numbness and headaches  Psychiatric/Behavioral: Negative for decreased concentration and suicidal ideas  The patient is not nervous/anxious  Objective:  /77 (BP Location: Right arm, Patient Position: Sitting, Cuff Size: Standard)   Pulse 99   Ht 5' 10" (1 778 m)   Wt 91 2 kg (201 lb)   BMI 28 84 kg/m²     Ortho Exam   Right knee -   Patient ambulates with  antalgic gait pattern  Uses no assistive device  No anatomical deformity  Skin is warm and dry to touch with no signs of erythema, ecchymosis, infection  Mild soft tissue swelling, Large effusion noted  ROM: 10°-90°,   TTP over  Medial joint line  Flexor and extensor mechanisms intact  Knee is stable to varus and valgus stress  - Lachman's  - Anterior Drawer, - Posterior Drawer  - medial French's, - lateral French's  - Pivot Shift  Calf compartments are soft and supple  2+ TP and DP pulses with brisk capillary refill to the toes  Sural, saphenous, tibial, superficial and deep peroneal motor and sensory distributions intact  Sensation light touch intact distally    Physical Exam  Constitutional:       Appearance: He is well-developed  HENT:      Right Ear: External ear normal       Left Ear: External ear normal       Nose: Nose normal    Eyes:      Conjunctiva/sclera: Conjunctivae normal       Pupils: Pupils are equal, round, and reactive to light  Pulmonary:      Effort: Pulmonary effort is normal    Musculoskeletal:      Cervical back: Normal range of motion  Comments:  As noted in HPI   Skin:     General: Skin is warm and dry  Neurological:      Mental Status: He is alert and oriented to person, place, and time  Psychiatric:         Behavior: Behavior normal          Thought Content:  Thought content normal          Judgment: Judgment normal          Imaging:   Attending Physician has personally reviewed pertinent imaging in PACS, impression is as follows:    Review of radiographic series taken 9/13/2021 of the Right knee shows mild DJD with large anterior fusion    Large joint arthrocentesis: R knee  Universal Protocol:  Consent: Verbal consent obtained  Risks and benefits: risks, benefits and alternatives were discussed  Consent given by: patient  Time out: Immediately prior to procedure a "time out" was called to verify the correct patient, procedure, equipment, support staff and site/side marked as required    Timeout called at: 9/14/2021 1:16 PM   Patient understanding: patient states understanding of the procedure being performed  Site marked: the operative site was marked  Patient identity confirmed: verbally with patient    Supporting Documentation  Indications: pain and joint swelling   Procedure Details  Location: knee - R knee  Preparation: Patient was prepped and draped in the usual sterile fashion  Needle size: 16 G  Ultrasound guidance: no  Approach: anterolateral    Aspirate amount: 46 mL  Aspirate: cloudy and blood-tinged    Patient tolerance: patient tolerated the procedure well with no immediate complications  Dressing:  Sterile dressing applied     Patient remained co hand through the procedure, experienced dizziness and nausea following the procedure          Scribe Attestation    I,:  Satinder Ferraro am acting as a scribe while in the presence of the attending physician :       I,:  Vince Luna DO personally performed the services described in this documentation    as scribed in my presence :

## 2021-09-16 ENCOUNTER — TELEPHONE (OUTPATIENT)
Dept: OBGYN CLINIC | Facility: CLINIC | Age: 61
End: 2021-09-16

## 2021-09-17 LAB
BACTERIA SPEC BFLD CULT: NO GROWTH
GRAM STN SPEC: NORMAL
GRAM STN SPEC: NORMAL

## 2021-09-20 ENCOUNTER — OFFICE VISIT (OUTPATIENT)
Dept: FAMILY MEDICINE CLINIC | Facility: CLINIC | Age: 61
End: 2021-09-20
Payer: COMMERCIAL

## 2021-09-20 VITALS
SYSTOLIC BLOOD PRESSURE: 122 MMHG | WEIGHT: 205 LBS | HEIGHT: 70 IN | RESPIRATION RATE: 20 BRPM | HEART RATE: 78 BPM | DIASTOLIC BLOOD PRESSURE: 76 MMHG | OXYGEN SATURATION: 98 % | TEMPERATURE: 98.6 F | BODY MASS INDEX: 29.35 KG/M2

## 2021-09-20 DIAGNOSIS — Z72.0 TOBACCO ABUSE: ICD-10-CM

## 2021-09-20 DIAGNOSIS — E11.9 CONTROLLED TYPE 2 DIABETES MELLITUS WITHOUT COMPLICATION, WITHOUT LONG-TERM CURRENT USE OF INSULIN (HCC): ICD-10-CM

## 2021-09-20 DIAGNOSIS — E66.3 OVERWEIGHT WITH BODY MASS INDEX (BMI) OF 29 TO 29.9 IN ADULT: ICD-10-CM

## 2021-09-20 DIAGNOSIS — E78.00 HYPERCHOLESTEREMIA: ICD-10-CM

## 2021-09-20 DIAGNOSIS — I10 ESSENTIAL HYPERTENSION: ICD-10-CM

## 2021-09-20 DIAGNOSIS — Z00.00 ANNUAL PHYSICAL EXAM: Primary | ICD-10-CM

## 2021-09-20 PROCEDURE — 4004F PT TOBACCO SCREEN RCVD TLK: CPT | Performed by: NURSE PRACTITIONER

## 2021-09-20 PROCEDURE — 99214 OFFICE O/P EST MOD 30 MIN: CPT | Performed by: NURSE PRACTITIONER

## 2021-09-20 PROCEDURE — 99396 PREV VISIT EST AGE 40-64: CPT | Performed by: NURSE PRACTITIONER

## 2021-09-20 PROCEDURE — 3725F SCREEN DEPRESSION PERFORMED: CPT | Performed by: NURSE PRACTITIONER

## 2021-09-20 PROCEDURE — 3008F BODY MASS INDEX DOCD: CPT | Performed by: NURSE PRACTITIONER

## 2021-09-20 NOTE — PROGRESS NOTES
HPI:  Marie Healy is a 62 y o  male here for his yearly health maintenance exam    Patient Active Problem List   Diagnosis    Gout    Tobacco abuse    Essential hypertension    Hypercholesteremia    Encounter for diabetic foot exam ([facility] [address] )    Diabetic eye exam ([facility] [address] )    Class 1 obesity due to excess calories with serious comorbidity and body mass index (BMI) of 34 0 to 34 9 in adult    Controlled type 2 diabetes mellitus without complication, without long-term current use of insulin (HCC)     Past Medical History:   Diagnosis Date    Gout     Osteoarthritis      Depression Screening and Follow-up Plan:   Patient was screened for depression during today's encounter  They screened negative with a PHQ-2 score of 0            PHQ-9 Depression Screening    PHQ-9:   Frequency of the following problems over the past two weeks:      Little interest or pleasure in doing things: 0 - not at all  Feeling down, depressed, or hopeless: 0 - not at all  PHQ-2 Score: 0           Current Outpatient Medications   Medication Sig Dispense Refill    amLODIPine-benazepril (LOTREL 5-10) 5-10 MG per capsule Take 1 capsule by mouth daily 90 capsule 3    buPROPion (WELLBUTRIN) 100 mg tablet Take 1 tablet (100 mg total) by mouth 2 (two) times a day 180 tablet 1    glucose blood test strip Use as instructed 100 each 3    Lancets (freestyle) lancets Use as instructed 100 each 3    meloxicam (MOBIC) 7 5 mg tablet Take 1 tablet (7 5 mg total) by mouth daily 30 tablet 0    ondansetron (ZOFRAN) 4 mg tablet Take 1 tablet (4 mg total) by mouth every 8 (eight) hours as needed for nausea or vomiting 20 tablet 0    atorvastatin (LIPITOR) 10 mg tablet Take 1 tablet (10 mg total) by mouth daily at bedtime (Patient not taking: Reported on 8/31/2021) 90 tablet 3    glucose monitoring kit (FREESTYLE) monitoring kit Use 1 each 2 (two) times a day before meals 1 each 0    Microlet Lancets MISC Use 2 (two) times a day (Patient not taking: Reported on 8/31/2021) 100 each 2     No current facility-administered medications for this visit  No Known Allergies  Immunization History   Administered Date(s) Administered    SARS-CoV-2 / COVID-19 mRNA IM (Flavorvanil) 04/26/2021, 05/17/2021       Patient Care Team:  Stephen dominguez PCP - General (Family Medicine)    Review of Systems   Constitutional: Negative for activity change, diaphoresis, fatigue and fever  HENT: Negative for congestion, facial swelling, hearing loss, rhinorrhea, sinus pressure, sinus pain, sneezing, sore throat and voice change  Eyes: Negative for discharge and visual disturbance  Respiratory: Negative for cough, choking, chest tightness, shortness of breath, wheezing and stridor  Cardiovascular: Negative for chest pain, palpitations and leg swelling  Gastrointestinal: Negative for abdominal distention, abdominal pain, constipation, diarrhea, nausea and vomiting  Endocrine: Negative for polydipsia, polyphagia and polyuria  Genitourinary: Negative for difficulty urinating, dysuria, frequency and urgency  Musculoskeletal: Positive for arthralgias, gait problem and joint swelling  Negative for back pain, myalgias, neck pain and neck stiffness  Skin: Negative for color change, rash and wound  Neurological: Negative for dizziness, syncope, speech difficulty, weakness, light-headedness and headaches  Hematological: Negative for adenopathy  Does not bruise/bleed easily  Psychiatric/Behavioral: Positive for dysphoric mood  Negative for agitation, behavioral problems, confusion, hallucinations, sleep disturbance and suicidal ideas  The patient is not nervous/anxious  Physical Exam :  Physical Exam  Vitals and nursing note reviewed  Constitutional:       General: He is not in acute distress  Appearance: Normal appearance  He is well-developed  He is not diaphoretic  Neck:      Thyroid: No thyromegaly  Trachea: No tracheal deviation  Cardiovascular:      Rate and Rhythm: Normal rate and regular rhythm  Heart sounds: Normal heart sounds  No murmur heard  Pulmonary:      Effort: Pulmonary effort is normal  No respiratory distress  Breath sounds: Normal breath sounds  No wheezing  Musculoskeletal:         General: Swelling (right knee) present  No deformity  Normal range of motion  Cervical back: Normal range of motion and neck supple  Skin:     General: Skin is warm and dry  Neurological:      Mental Status: He is alert and oriented to person, place, and time  Psychiatric:         Mood and Affect: Mood normal          Speech: Speech normal          Behavior: Behavior normal          Thought Content: Thought content normal          Judgment: Judgment normal            Reviewed Updated [facility] Prior Wellness Visits:   Last Health Maintenance visit information was reviewed, patient interviewed , no change since last HM visit yes  Last HM visit information was reviewed, patient interviewed and updates made to the record today yes    Assessment and Plan:  1  Annual physical exam     2  Controlled type 2 diabetes mellitus without complication, without long-term current use of insulin ([facility] [address] )     3  Essential hypertension     4  Hypercholesteremia     5  Tobacco abuse     6   Overweight with body mass index (BMI) of 29 to 29 9 in adult         Health Maintenance Due   Topic Date Due    Pneumococcal Vaccine: Pediatrics (0 to 5 Years) and At-Risk Patients (6 to 59 Years) (1 of 2 - PPSV23) Never done    HIV Screening  Never done    DTaP,Tdap,and Td Vaccines (1 - Tdap) Never done    Annual Physical  09/08/2021    Influenza Vaccine (1) 09/01/2021    BMI: Followup Plan  01/08/2022

## 2021-09-20 NOTE — PROGRESS NOTES
[address] [place] [address] Primary Care        NAME: Luis Miguel Romero is a 62 y o  male  : 1960    MRN: [id number]  DATE: 2021  TIME: 9:18 AM    Assessment and Plan   Essential hypertension [I10]  1  Essential hypertension     2  Controlled type 2 diabetes mellitus without complication, without long-term current use of insulin ([address] )     3  Hypercholesteremia     4  Tobacco abuse           Patient Instructions     Patient Instructions   Keep appointment with ortho  6 month [facility]  Reviewed all lab results  Continue same medications          Chief Complaint     Chief Complaint   Patient presents with    Physical Exam    Follow-up         History of Present Illness       Here today for follow up and lab review- recently had his right knee effusion drained- over 46 cc, He did have vasovagal syncope at that visit but has felt ok since  Has follow up with ortho  in [facility]- he is aware and plans to keep this appointment  Review of Systems   Review of Systems   Constitutional: Negative for activity change, diaphoresis, fatigue and fever  HENT: Negative for congestion, facial swelling, hearing loss, rhinorrhea, sinus pressure, sinus pain, sneezing, sore throat and voice change  Eyes: Negative for discharge and visual disturbance  Respiratory: Negative for cough, choking, chest tightness, shortness of breath, wheezing and stridor  Cardiovascular: Negative for chest pain, palpitations and leg swelling  Gastrointestinal: Negative for abdominal distention, abdominal pain, constipation, diarrhea, nausea and vomiting  Endocrine: Negative for polydipsia, polyphagia and polyuria  Genitourinary: Negative for difficulty urinating, dysuria, frequency and urgency  Musculoskeletal: Positive for arthralgias, gait problem and joint swelling  Negative for back pain, myalgias, neck pain and neck stiffness  Skin: Negative for color change, rash and wound     Neurological: Negative for dizziness, syncope, speech difficulty, weakness, light-headedness and headaches  Hematological: Negative for adenopathy  Does not bruise/bleed easily  Psychiatric/Behavioral: Positive for dysphoric mood  Negative for agitation, behavioral problems, confusion, hallucinations, sleep disturbance and suicidal ideas  The patient is not nervous/anxious  Current Medications       Current Outpatient Medications:     amLODIPine-benazepril (LOTREL 5-10) 5-10 MG per capsule, Take 1 capsule by mouth daily, Disp: 90 capsule, Rfl: 3    buPROPion (WELLBUTRIN) 100 mg tablet, Take 1 tablet (100 mg total) by mouth 2 (two) times a day, Disp: 180 tablet, Rfl: 1    glucose blood test strip, Use as instructed, Disp: 100 each, Rfl: 3    Lancets (freestyle) lancets, Use as instructed, Disp: 100 each, Rfl: 3    meloxicam (MOBIC) 7 5 mg tablet, Take 1 tablet (7 5 mg total) by mouth daily, Disp: 30 tablet, Rfl: 0    ondansetron (ZOFRAN) 4 mg tablet, Take 1 tablet (4 mg total) by mouth every 8 (eight) hours as needed for nausea or vomiting, Disp: 20 tablet, Rfl: 0    atorvastatin (LIPITOR) 10 mg tablet, Take 1 tablet (10 mg total) by mouth daily at bedtime (Patient not taking: Reported on 8/31/2021), Disp: 90 tablet, Rfl: 3    glucose monitoring kit (FREESTYLE) monitoring kit, Use 1 each 2 (two) times a day before meals, Disp: 1 each, Rfl: 0    Microlet Lancets MISC, Use 2 (two) times a day (Patient not taking: Reported on 8/31/2021), Disp: 100 each, Rfl: 2    Current Allergies     Allergies as of 09/20/2021    (No Known Allergies)            The following portions of the patient's history were reviewed and updated as appropriate: allergies, current medications, past family history, past medical history, past social history, past surgical history and problem list      Past Medical History:   Diagnosis Date    Gout     Osteoarthritis        History reviewed  No pertinent surgical history      Family History   Problem Relation Age of Onset    Arthritis Mother     Coronary artery disease Father          Medications have been verified  Objective   /76   Pulse 78   Temp 98 6 °F (37 °C) (Tympanic)   Resp 20   Ht 5' 10" (1 778 m)   Wt 93 kg (205 lb)   SpO2 98%   BMI 29 41 kg/m²        Physical Exam     Physical Exam  Vitals and nursing note reviewed  Constitutional:       General: He is not in acute distress  Appearance: He is well-developed  He is not diaphoretic  Neck:      Thyroid: No thyromegaly  Trachea: No tracheal deviation  Cardiovascular:      Rate and Rhythm: Normal rate and regular rhythm  Heart sounds: Normal heart sounds  No murmur heard  Pulmonary:      Effort: Pulmonary effort is normal  No respiratory distress  Breath sounds: Normal breath sounds  No wheezing  Musculoskeletal:         General: Swelling (right knee- mild) and tenderness present  No deformity  Normal range of motion  Cervical back: Normal range of motion and neck supple  Skin:     General: Skin is warm and dry  Neurological:      Mental Status: He is alert and oriented to person, place, and time  Psychiatric:         Mood and Affect: Mood normal          Speech: Speech normal          Behavior: Behavior normal          Thought Content:  Thought content normal          Judgment: Judgment normal

## 2021-10-14 ENCOUNTER — TELEPHONE (OUTPATIENT)
Dept: FAMILY MEDICINE CLINIC | Facility: CLINIC | Age: 61
End: 2021-10-14

## 2021-11-04 ENCOUNTER — VBI (OUTPATIENT)
Dept: ADMINISTRATIVE | Facility: OTHER | Age: 61
End: 2021-11-04

## 2021-11-16 ENCOUNTER — OFFICE VISIT (OUTPATIENT)
Dept: OBGYN CLINIC | Facility: CLINIC | Age: 61
End: 2021-11-16
Payer: COMMERCIAL

## 2021-11-16 VITALS
HEIGHT: 70 IN | WEIGHT: 207 LBS | DIASTOLIC BLOOD PRESSURE: 89 MMHG | HEART RATE: 84 BPM | SYSTOLIC BLOOD PRESSURE: 133 MMHG | BODY MASS INDEX: 29.63 KG/M2

## 2021-11-16 DIAGNOSIS — M10.9 GOUT, UNSPECIFIED CAUSE, UNSPECIFIED CHRONICITY, UNSPECIFIED SITE: Primary | ICD-10-CM

## 2021-11-16 DIAGNOSIS — M17.12 PRIMARY OSTEOARTHRITIS OF LEFT KNEE: ICD-10-CM

## 2021-11-16 DIAGNOSIS — M25.462 EFFUSION OF LEFT KNEE: Primary | ICD-10-CM

## 2021-11-16 PROCEDURE — 3075F SYST BP GE 130 - 139MM HG: CPT | Performed by: PHYSICIAN ASSISTANT

## 2021-11-16 PROCEDURE — 4004F PT TOBACCO SCREEN RCVD TLK: CPT | Performed by: PHYSICIAN ASSISTANT

## 2021-11-16 PROCEDURE — 3079F DIAST BP 80-89 MM HG: CPT | Performed by: PHYSICIAN ASSISTANT

## 2021-11-16 PROCEDURE — 20610 DRAIN/INJ JOINT/BURSA W/O US: CPT | Performed by: PHYSICIAN ASSISTANT

## 2021-11-16 PROCEDURE — 3008F BODY MASS INDEX DOCD: CPT | Performed by: PHYSICIAN ASSISTANT

## 2021-11-16 PROCEDURE — 99213 OFFICE O/P EST LOW 20 MIN: CPT | Performed by: PHYSICIAN ASSISTANT

## 2021-11-16 RX ORDER — BUPIVACAINE HYDROCHLORIDE 2.5 MG/ML
4 INJECTION, SOLUTION INFILTRATION; PERINEURAL
Status: COMPLETED | OUTPATIENT
Start: 2021-11-16 | End: 2021-11-16

## 2021-11-16 RX ORDER — TRIAMCINOLONE ACETONIDE 40 MG/ML
80 INJECTION, SUSPENSION INTRA-ARTICULAR; INTRAMUSCULAR
Status: COMPLETED | OUTPATIENT
Start: 2021-11-16 | End: 2021-11-16

## 2021-11-16 RX ADMIN — TRIAMCINOLONE ACETONIDE 80 MG: 40 INJECTION, SUSPENSION INTRA-ARTICULAR; INTRAMUSCULAR at 14:54

## 2021-11-16 RX ADMIN — BUPIVACAINE HYDROCHLORIDE 4 ML: 2.5 INJECTION, SOLUTION INFILTRATION; PERINEURAL at 14:54

## 2021-11-18 RX ORDER — COLCHICINE 0.6 MG/1
TABLET ORAL
Qty: 3 TABLET | Refills: 2 | Status: SHIPPED | OUTPATIENT
Start: 2021-11-18 | End: 2021-12-07 | Stop reason: SDUPTHER

## 2021-12-07 ENCOUNTER — TELEPHONE (OUTPATIENT)
Dept: FAMILY MEDICINE CLINIC | Facility: CLINIC | Age: 61
End: 2021-12-07

## 2021-12-07 DIAGNOSIS — M10.9 GOUT, UNSPECIFIED CAUSE, UNSPECIFIED CHRONICITY, UNSPECIFIED SITE: ICD-10-CM

## 2021-12-07 RX ORDER — COLCHICINE 0.6 MG/1
TABLET ORAL
Qty: 3 TABLET | Refills: 2 | Status: SHIPPED | OUTPATIENT
Start: 2021-12-07

## 2021-12-13 ENCOUNTER — TELEPHONE (OUTPATIENT)
Dept: FAMILY MEDICINE CLINIC | Facility: CLINIC | Age: 61
End: 2021-12-13

## 2021-12-13 DIAGNOSIS — E11.9 CONTROLLED TYPE 2 DIABETES MELLITUS WITHOUT COMPLICATION, WITHOUT LONG-TERM CURRENT USE OF INSULIN (HCC): ICD-10-CM

## 2021-12-13 DIAGNOSIS — R11.0 NAUSEA: Primary | ICD-10-CM

## 2021-12-13 DIAGNOSIS — M10.9 GOUT, UNSPECIFIED CAUSE, UNSPECIFIED CHRONICITY, UNSPECIFIED SITE: ICD-10-CM

## 2021-12-13 DIAGNOSIS — E66.3 OVERWEIGHT WITH BODY MASS INDEX (BMI) OF 29 TO 29.9 IN ADULT: ICD-10-CM

## 2021-12-13 RX ORDER — ONDANSETRON 4 MG/1
4 TABLET, FILM COATED ORAL EVERY 8 HOURS PRN
Qty: 20 TABLET | Refills: 0 | Status: SHIPPED | OUTPATIENT
Start: 2021-12-13 | End: 2022-01-06 | Stop reason: SDUPTHER

## 2022-01-06 DIAGNOSIS — R11.0 NAUSEA: ICD-10-CM

## 2022-01-06 RX ORDER — ONDANSETRON 4 MG/1
4 TABLET, FILM COATED ORAL EVERY 8 HOURS PRN
Qty: 20 TABLET | Refills: 0 | Status: SHIPPED | OUTPATIENT
Start: 2022-01-06 | End: 2022-02-01 | Stop reason: SDUPTHER

## 2022-01-06 NOTE — TELEPHONE ENCOUNTER
Pt called and is requesting a refill on his Ondansetron 4 mg  Takes 1 Q 8 hrs # 20    Stated it really does help him    Please advise    [facility]     Phone: [phone]

## 2022-01-06 NOTE — TELEPHONE ENCOUNTER
Patient requesting refill(s) of: Zofran 4 mg Q8H PRN    Last filled: 12/13/2021 #20 x 0  Last appt:9/20/2021  Next appt:3/21/2022  Pharmacy: [facility]

## 2022-01-10 ENCOUNTER — TELEPHONE (OUTPATIENT)
Dept: FAMILY MEDICINE CLINIC | Facility: CLINIC | Age: 62
End: 2022-01-10

## 2022-01-11 ENCOUNTER — OFFICE VISIT (OUTPATIENT)
Dept: FAMILY MEDICINE CLINIC | Facility: CLINIC | Age: 62
End: 2022-01-11
Payer: MEDICARE

## 2022-01-11 VITALS
TEMPERATURE: 98.5 F | HEIGHT: 70 IN | DIASTOLIC BLOOD PRESSURE: 98 MMHG | HEART RATE: 84 BPM | BODY MASS INDEX: 31.35 KG/M2 | SYSTOLIC BLOOD PRESSURE: 158 MMHG | WEIGHT: 219 LBS | OXYGEN SATURATION: 98 %

## 2022-01-11 DIAGNOSIS — I10 UNCONTROLLED HYPERTENSION: ICD-10-CM

## 2022-01-11 DIAGNOSIS — R20.0 NUMBNESS: ICD-10-CM

## 2022-01-11 DIAGNOSIS — R51.9 NONINTRACTABLE HEADACHE, UNSPECIFIED CHRONICITY PATTERN, UNSPECIFIED HEADACHE TYPE: Primary | ICD-10-CM

## 2022-01-11 PROCEDURE — 99214 OFFICE O/P EST MOD 30 MIN: CPT | Performed by: NURSE PRACTITIONER

## 2022-01-11 RX ORDER — PREDNISONE 20 MG/1
TABLET ORAL
Qty: 12 TABLET | Refills: 0 | Status: SHIPPED | OUTPATIENT
Start: 2022-01-11 | End: 2022-01-19

## 2022-01-11 RX ORDER — LISINOPRIL 10 MG/1
10 TABLET ORAL DAILY
Qty: 30 TABLET | Refills: 1 | Status: SHIPPED | OUTPATIENT
Start: 2022-01-11 | End: 2022-02-01 | Stop reason: SDUPTHER

## 2022-01-11 NOTE — PROGRESS NOTES
[address] [place] [address] Primary Care        NAME: Miguel Coronado is a 62 y o  male  : 1960    MRN: [id number]  DATE: 2022  TIME: 7:55 AM    Assessment and Plan   Nonintractable headache, unspecified chronicity pattern, unspecified headache type [R51 9]  1  Nonintractable headache, unspecified chronicity pattern, unspecified headache type  predniSONE 20 mg tablet   2  Numbness  predniSONE 20 mg tablet   3  Uncontrolled hypertension         1  Nonintractable headache, unspecified chronicity pattern, unspecified headache type  Will try prednisone to see if this helps with numbness and headache  Encouraged to stop smoking as this worsens numbness and HA  - predniSONE 20 mg tablet; Take 1 tablet (20 mg total) by mouth 2 (two) times a day with meals for 4 days, THEN 1 tablet (20 mg total) daily for 4 days  Dispense: 12 tablet; Refill: 0    2  Numbness  Along with mild HA that worsens with smoking    - predniSONE 20 mg tablet; Take 1 tablet (20 mg total) by mouth 2 (two) times a day with meals for 4 days, THEN 1 tablet (20 mg total) daily for 4 days  Dispense: 12 tablet; Refill: 0    3  Uncontrolled hypertension  - Uncontrolled  Not taking anything  Reports he feels " drunk" when taking his medications  - lisinopril (ZESTRIL) 10 mg tablet; Take 1 tablet (10 mg total) by mouth daily  Dispense: 30 tablet; Refill: 1    Patient Instructions     There are no Patient Instructions on file for this visit  Chief Complaint     Chief Complaint   Patient presents with    Headache     Stopped talking all medications  Said that he feels like he will get drunk and fall over  Got a headache this morning  felt like left side of brain goes numb  History of Present Illness       Patient here for an acute visit with c/o headache  to alternating side of his head, sometimes his whole head and then gets numbness, mostly all numbness  Happening everyday, comes and goes, getting more frequently   Reports worse when he smokes a cigarettes  Right now has some numbness to the right forehead  Reports BP is better controlled when he checks this at home  Not taking any medication at this time because he states these made him feel drunk so he stopped them  Review of Systems   Review of Systems   Constitutional: Negative for fatigue and fever  Respiratory: Negative  Negative for shortness of breath and wheezing  Cardiovascular: Negative  Negative for chest pain and palpitations  Musculoskeletal: Negative  Neurological: Positive for numbness and headaches  Hematological: Negative for adenopathy  Psychiatric/Behavioral: Negative for decreased concentration and self-injury  The patient is not nervous/anxious  PHQ-2/9 Depression Screening    Little interest or pleasure in doing things: 0 - not at all  Feeling down, depressed, or hopeless: 0 - not at all  PHQ-2 Score: 0  PHQ-2 Interpretation: Negative depression screen        Current Medications       Current Outpatient Medications:     glucose blood test strip, Use as instructed, Disp: 100 each, Rfl: 3    glucose monitoring kit (FREESTYLE) monitoring kit, Use 1 each 2 (two) times a day before meals, Disp: 1 each, Rfl: 0    Lancets (freestyle) lancets, Use as instructed, Disp: 100 each, Rfl: 3    Microlet Lancets MISC, Use 2 (two) times a day, Disp: 100 each, Rfl: 2    amLODIPine-benazepril (LOTREL 5-10) 5-10 MG per capsule, Take 1 capsule by mouth daily (Patient not taking: Reported on 1/11/2022 ), Disp: 90 capsule, Rfl: 3    atorvastatin (LIPITOR) 10 mg tablet, Take 1 tablet (10 mg total) by mouth daily at bedtime (Patient not taking: Reported on 8/31/2021), Disp: 90 tablet, Rfl: 3    buPROPion (WELLBUTRIN) 100 mg tablet, Take 1 tablet (100 mg total) by mouth 2 (two) times a day (Patient not taking: Reported on 1/11/2022 ), Disp: 180 tablet, Rfl: 1    colchicine (COLCRYS) 0 6 mg tablet, 1 2mg PO x 1 then 0 6mg PO 1 hour later x 1   Total of 3 pills per attack  May not repeat for 3 days (Patient not taking: Reported on 1/11/2022 ), Disp: 3 tablet, Rfl: 2    meloxicam (MOBIC) 7 5 mg tablet, Take 1 tablet (7 5 mg total) by mouth daily (Patient not taking: Reported on 1/11/2022 ), Disp: 30 tablet, Rfl: 0    ondansetron (ZOFRAN) 4 mg tablet, Take 1 tablet (4 mg total) by mouth every 8 (eight) hours as needed for nausea or vomiting (Patient not taking: Reported on 1/11/2022 ), Disp: 20 tablet, Rfl: 0    predniSONE 20 mg tablet, Take 1 tablet (20 mg total) by mouth 2 (two) times a day with meals for 4 days, THEN 1 tablet (20 mg total) daily for 4 days  , Disp: 12 tablet, Rfl: 0    Current Allergies     Allergies as of 01/11/2022    (No Known Allergies)            The following portions of the patient's history were reviewed and updated as appropriate: allergies, current medications, past family history, past medical history, past social history, past surgical history and problem list      Past Medical History:   Diagnosis Date    Gout     Osteoarthritis        No past surgical history on file  Family History   Problem Relation Age of Onset    Arthritis Mother     Coronary artery disease Father          Medications have been verified  Objective   /98   Pulse 84   Temp 98 5 °F (36 9 °C)   Ht 5' 10" (1 778 m)   Wt 99 3 kg (219 lb)   SpO2 98%   BMI 31 42 kg/m²        Physical Exam     Physical Exam  Vitals and nursing note reviewed  Constitutional:       General: He is not in acute distress  Appearance: Normal appearance  He is well-developed  He is not ill-appearing  Eyes:      General: Lids are normal    Cardiovascular:      Rate and Rhythm: Normal rate and regular rhythm  Heart sounds: Normal heart sounds, S1 normal and S2 normal  No murmur heard  No friction rub  No gallop  Pulmonary:      Effort: Pulmonary effort is normal  No respiratory distress  Breath sounds: Normal breath sounds   No decreased breath sounds or wheezing  Musculoskeletal:         General: No tenderness or deformity  Normal range of motion  Skin:     General: Skin is warm  Findings: No erythema or rash  Neurological:      Mental Status: He is alert and oriented to person, place, and time  Psychiatric:         Behavior: Behavior normal  Behavior is cooperative  Thought Content:  Thought content normal

## 2022-01-28 ENCOUNTER — CLINICAL SUPPORT (OUTPATIENT)
Dept: FAMILY MEDICINE CLINIC | Facility: CLINIC | Age: 62
End: 2022-01-28
Payer: MEDICARE

## 2022-01-28 ENCOUNTER — TELEPHONE (OUTPATIENT)
Dept: FAMILY MEDICINE CLINIC | Facility: CLINIC | Age: 62
End: 2022-01-28

## 2022-01-28 VITALS — DIASTOLIC BLOOD PRESSURE: 94 MMHG | SYSTOLIC BLOOD PRESSURE: 166 MMHG

## 2022-01-28 DIAGNOSIS — I10 HYPERTENSION, UNSPECIFIED TYPE: Primary | ICD-10-CM

## 2022-01-28 PROCEDURE — 99211 OFF/OP EST MAY X REQ PHY/QHP: CPT

## 2022-01-28 NOTE — TELEPHONE ENCOUNTER
Patient was in for a nurse visit for a BP check  His bp was 166/94  Spoke with Mayra  She would like him to take 20 MG of Lisinopril daily and recheck at his appt on Tuesday  Patient was reminded of his appt

## 2022-02-01 ENCOUNTER — OFFICE VISIT (OUTPATIENT)
Dept: FAMILY MEDICINE CLINIC | Facility: CLINIC | Age: 62
End: 2022-02-01
Payer: MEDICARE

## 2022-02-01 VITALS
RESPIRATION RATE: 20 BRPM | HEART RATE: 84 BPM | WEIGHT: 218 LBS | OXYGEN SATURATION: 98 % | HEIGHT: 70 IN | SYSTOLIC BLOOD PRESSURE: 128 MMHG | BODY MASS INDEX: 31.21 KG/M2 | DIASTOLIC BLOOD PRESSURE: 72 MMHG | TEMPERATURE: 98 F

## 2022-02-01 DIAGNOSIS — I10 ESSENTIAL HYPERTENSION: Primary | ICD-10-CM

## 2022-02-01 DIAGNOSIS — I10 UNCONTROLLED HYPERTENSION: ICD-10-CM

## 2022-02-01 DIAGNOSIS — E11.9 CONTROLLED TYPE 2 DIABETES MELLITUS WITHOUT COMPLICATION, WITHOUT LONG-TERM CURRENT USE OF INSULIN (HCC): ICD-10-CM

## 2022-02-01 DIAGNOSIS — E78.00 HYPERCHOLESTEREMIA: ICD-10-CM

## 2022-02-01 DIAGNOSIS — E11.9 ENCOUNTER FOR DIABETIC FOOT EXAM (HCC): ICD-10-CM

## 2022-02-01 DIAGNOSIS — G44.019 EPISODIC CLUSTER HEADACHE, NOT INTRACTABLE: ICD-10-CM

## 2022-02-01 DIAGNOSIS — E66.09 CLASS 1 OBESITY DUE TO EXCESS CALORIES WITH SERIOUS COMORBIDITY AND BODY MASS INDEX (BMI) OF 31.0 TO 31.9 IN ADULT: ICD-10-CM

## 2022-02-01 DIAGNOSIS — R11.0 NAUSEA: ICD-10-CM

## 2022-02-01 PROCEDURE — 99214 OFFICE O/P EST MOD 30 MIN: CPT | Performed by: NURSE PRACTITIONER

## 2022-02-01 RX ORDER — NAPROXEN 500 MG/1
500 TABLET ORAL 2 TIMES DAILY WITH MEALS
Qty: 180 TABLET | Refills: 3 | Status: SHIPPED | OUTPATIENT
Start: 2022-02-01

## 2022-02-01 RX ORDER — ONDANSETRON 4 MG/1
4 TABLET, FILM COATED ORAL EVERY 8 HOURS PRN
Qty: 20 TABLET | Refills: 1 | Status: SHIPPED | OUTPATIENT
Start: 2022-02-01 | End: 2022-04-13 | Stop reason: SDUPTHER

## 2022-02-01 RX ORDER — LISINOPRIL 20 MG/1
20 TABLET ORAL DAILY
Qty: 90 TABLET | Refills: 1
Start: 2022-02-01 | End: 2022-02-24 | Stop reason: SDUPTHER

## 2022-02-01 NOTE — PROGRESS NOTES
[address] [place] [address] Primary Care        NAME: Miguel Coronado is a 62 y o  male  : 1960    MRN: [id number]  DATE: 2022  TIME: 11:52 AM    Assessment and Plan   Essential hypertension [I10]  1  Essential hypertension     2  Encounter for diabetic foot exam ([facility] [address] )     3  Controlled type 2 diabetes mellitus without complication, without long-term current use of insulin ([facility])  Microalbumin / creatinine urine ratio   4  Uncontrolled hypertension  lisinopril (ZESTRIL) 20 mg tablet   5  Episodic cluster headache, not intractable  naproxen (Naprosyn) 500 mg tablet   6  Nausea  ondansetron (ZOFRAN) 4 mg tablet   7  Hypercholesteremia  Lipid panel   8  Class 1 obesity due to excess calories with serious comorbidity and body mass index (BMI) of 31 0 to 31 9 in adult       BMI Counseling: Body mass index is 31 28 kg/m²  The BMI is above normal  Nutrition recommendations include decreasing portion sizes, encouraging healthy choices of fruits and vegetables, decreasing fast food intake, consuming healthier snacks, limiting drinks that contain sugar, moderation in carbohydrate intake and increasing intake of lean protein  Rationale for BMI follow-up plan is due to patient being overweight or obese  Patient Instructions     Patient Instructions   Naproxen for headaches  Lisinopril 20mg daily as directed  Get labs done tomorrow  6 month medcheck or call sooner for problems/concerns  10% - bad control"> 10% - bad control,Hemoglobin A1c (HbA1c) greater than 10% indicating poor diabetic control,Haemoglobin A1c greater than 10% indicating poor diabetic control">   Diabetes Mellitus Type 2 in Adults, Ambulatory Care   GENERAL INFORMATION:   Diabetes mellitus type 2  is a disease that affects how your body uses glucose (sugar)  Insulin helps move sugar out of the blood so it can be used for energy  Normally, when the blood sugar level increases, the pancreas makes more insulin   Type 2 diabetes develops because either the body cannot make enough insulin, or it cannot use the insulin correctly  After many years, your pancreas may stop making insulin  Common symptoms include the following:   · More hunger or thirst than usual     · Frequent urination     · Weight loss without trying     · Blurred vision  Seek immediate care for the following symptoms:   · Severe abdominal pain, or pain that spreads to your back  You may also be vomiting  · Trouble staying awake or focusing    · Shaking or sweating    · Blurred or double vision    · Breath has a fruity, sweet smell    · Breathing is deep and labored, or rapid and shallow    · Heartbeat is fast and weak  Treatment for diabetes mellitus type 2  includes keeping your blood sugar at a normal level  You must eat the right foods, and exercise regularly  You may also need medicine if you cannot control your blood sugar level with nutrition and exercise  Manage diabetes mellitus type 2:   · Check your blood sugar level  You will be taught how to check a small drop of blood in a glucose monitor  Ask your healthcare provider when and how often to check during the day  Ask your healthcare provider what your blood sugar levels should be when you check them  · Keep track of carbohydrates (sugar and starchy foods)  Your blood sugar level can get too high if you eat too many carbohydrates  Your dietitian will help you plan meals and snacks that have the right amount of carbohydrates  · Eat low-fat foods  Some examples are skinless chicken and low-fat milk  · Eat less sodium (salt)  Some examples of high-sodium foods to limit are soy sauce, potato chips, and soup  Do not add salt to food you cook  Limit your use of table salt  · Eat high-fiber foods  Foods that are a good source of fiber include vegetables, whole grain bread, and beans  · Limit alcohol  Alcohol affects your blood sugar level and can make it harder to manage your diabetes   Women should limit alcohol to 1 drink a day  Men should limit alcohol to 2 drinks a day  A drink of alcohol is 12 ounces of beer, 5 ounces of wine, or 1½ ounces of liquor  · Get regular exercise  Exercise can help keep your blood sugar level steady, decrease your risk of heart disease, and help you lose weight  Exercise for at least 30 minutes, 5 days a week  Include muscle strengthening activities 2 days each week  Work with your healthcare provider to create an exercise plan  · Check your feet each day  for injuries or open sores  Ask your healthcare provider for activities you can do if you have an open sore  · Quit smoking  If you smoke, it is never too late to quit  Smoking can worsen the problems that may occur with diabetes  Ask your healthcare provider for information about how to stop smoking if you are having trouble quitting  · Ask about your weight:  Ask healthcare providers if you need to lose weight, and how much to lose  Ask them to help you with a weight loss program  Even a 10 to 15 pound weight loss can help you manage your blood sugar level  · Carry medical alert identification  Wear medical alert jewelry or carry a card that says you have diabetes  Ask your healthcare provider where to get these items  · Ask about vaccines  Diabetes puts you at risk of serious illness if you get the flu, pneumonia, or hepatitis  Ask your healthcare provider if you should get a flu, pneumonia, or hepatitis B vaccine, and when to get the vaccine  Follow up with your healthcare provider as directed:  Write down your questions so you remember to ask them during your visits  CARE AGREEMENT:   You have the right to help plan your care  Learn about your health condition and how it may be treated  Discuss treatment options with your caregivers to decide what care you want to receive  You always have the right to refuse treatment  The above information is an  only   It is not intended as medical advice for individual conditions or treatments  Talk to your doctor, nurse or pharmacist before following any medical regimen to see if it is safe and effective for you  © 2014 [address] is for End User's use only and may not be sold, redistributed or otherwise used for commercial purposes  All illustrations and images included in CareNotes® are the copyrighted property of [employer]  or Gerry Estrada  Chief Complaint     Chief Complaint   Patient presents with    Follow-up         History of Present Illness       Here for medcheck- is taking Lisinopril 20mg daily-   Reports right sided headache- takes an aleve, or ibuprofen, or ASA- it makes his head numb- does report he was diagnosed with cluster headaches in the past        Review of Systems   Review of Systems   Constitutional: Negative for activity change, diaphoresis, fatigue and fever  HENT: Negative for congestion, facial swelling, hearing loss, rhinorrhea, sinus pressure, sinus pain, sneezing, sore throat and voice change  Eyes: Negative for discharge and visual disturbance  Respiratory: Negative for cough, choking, chest tightness, shortness of breath, wheezing and stridor  Cardiovascular: Negative for chest pain, palpitations and leg swelling  Gastrointestinal: Negative for abdominal distention, abdominal pain, constipation, diarrhea, nausea and vomiting  Endocrine: Negative for polydipsia, polyphagia and polyuria  Genitourinary: Negative for difficulty urinating, dysuria, frequency and urgency  Musculoskeletal: Negative for arthralgias, back pain, gait problem, joint swelling, myalgias, neck pain and neck stiffness  Skin: Negative for color change, rash and wound  Neurological: Positive for headaches  Negative for dizziness, syncope, speech difficulty, weakness and light-headedness  Hematological: Negative for adenopathy  Does not bruise/bleed easily  Psychiatric/Behavioral: Negative for agitation, behavioral problems, confusion, hallucinations, sleep disturbance and suicidal ideas  The patient is not nervous/anxious  Current Medications       Current Outpatient Medications:     lisinopril (ZESTRIL) 20 mg tablet, Take 1 tablet (20 mg total) by mouth daily, Disp: 90 tablet, Rfl: 1    atorvastatin (LIPITOR) 10 mg tablet, Take 1 tablet (10 mg total) by mouth daily at bedtime (Patient not taking: Reported on 8/31/2021), Disp: 90 tablet, Rfl: 3    buPROPion (WELLBUTRIN) 100 mg tablet, Take 1 tablet (100 mg total) by mouth 2 (two) times a day (Patient not taking: Reported on 1/11/2022 ), Disp: 180 tablet, Rfl: 1    colchicine (COLCRYS) 0 6 mg tablet, 1 2mg PO x 1 then 0 6mg PO 1 hour later x 1  Total of 3 pills per attack   May not repeat for 3 days (Patient not taking: Reported on 1/11/2022 ), Disp: 3 tablet, Rfl: 2    glucose blood test strip, Use as instructed (Patient not taking: Reported on 1/28/2022 ), Disp: 100 each, Rfl: 3    glucose monitoring kit (FREESTYLE) monitoring kit, Use 1 each 2 (two) times a day before meals (Patient not taking: Reported on 1/28/2022 ), Disp: 1 each, Rfl: 0    Lancets (freestyle) lancets, Use as instructed (Patient not taking: Reported on 1/28/2022 ), Disp: 100 each, Rfl: 3    Microlet Lancets MISC, Use 2 (two) times a day, Disp: 100 each, Rfl: 2    naproxen (Naprosyn) 500 mg tablet, Take 1 tablet (500 mg total) by mouth 2 (two) times a day with meals, Disp: 180 tablet, Rfl: 3    ondansetron (ZOFRAN) 4 mg tablet, Take 1 tablet (4 mg total) by mouth every 8 (eight) hours as needed for nausea or vomiting, Disp: 20 tablet, Rfl: 1    Current Allergies     Allergies as of 02/01/2022    (No Known Allergies)            The following portions of the patient's history were reviewed and updated as appropriate: allergies, current medications, past family history, past medical history, past social history, past surgical history and problem list      Past Medical History:   Diagnosis Date    Gout     Osteoarthritis        History reviewed  No pertinent surgical history  Family History   Problem Relation Age of Onset    Arthritis Mother     Coronary artery disease Father          Medications have been verified  Objective   /72   Pulse 84   Temp 98 °F (36 7 °C) (Tympanic)   Resp 20   Ht 5' 10" (1 778 m)   Wt 98 9 kg (218 lb)   SpO2 98%   BMI 31 28 kg/m²        Physical Exam     Physical Exam  Vitals and nursing note reviewed  Constitutional:       General: He is not in acute distress  Appearance: He is well-developed  He is not diaphoretic  Neck:      Thyroid: No thyromegaly  Trachea: No tracheal deviation  Cardiovascular:      Rate and Rhythm: Normal rate and regular rhythm  Pulses: no weak pulses          Dorsalis pedis pulses are 2+ on the right side and 2+ on the left side  Heart sounds: Normal heart sounds  No murmur heard  Pulmonary:      Effort: Pulmonary effort is normal  No respiratory distress  Breath sounds: Normal breath sounds  No wheezing  Musculoskeletal:         General: No tenderness or deformity  Normal range of motion  Cervical back: Normal range of motion and neck supple  Feet:      Right foot:      Skin integrity: Dry skin present  No ulcer, skin breakdown, erythema, warmth or callus  Left foot:      Skin integrity: Dry skin present  No ulcer, skin breakdown, erythema, warmth or callus  Skin:     General: Skin is warm and dry  Neurological:      Mental Status: He is alert and oriented to person, place, and time  Psychiatric:         Mood and Affect: Mood normal          Speech: Speech normal          Behavior: Behavior normal          Thought Content: Thought content normal          Judgment: Judgment normal          Patient's shoes and socks removed      Right Foot/Ankle   Right Foot Inspection  Skin Exam: skin normal, skin intact and dry skin  No warmth, no callus, no erythema, no maceration, no abnormal color, no pre-ulcer, no ulcer and no callus  Toe Exam: ROM and strength within normal limits  Sensory   Monofilament testing: intact    Vascular  Capillary refills: < 3 seconds  The right DP pulse is 2+  Left Foot/Ankle  Left Foot Inspection  Skin Exam: skin normal, skin intact and dry skin  No warmth, no erythema, no maceration, normal color, no pre-ulcer, no ulcer and no callus  Toe Exam: ROM and strength within normal limits  Sensory   Monofilament testing: intact    Vascular  Capillary refills: < 3 seconds  The left DP pulse is 2+       Assign Risk Category  No deformity present  No loss of protective sensation  No weak pulses  Risk: 0

## 2022-02-01 NOTE — PATIENT INSTRUCTIONS
Naproxen for headaches  Lisinopril 20mg daily as directed  Get labs done tomorrow  6 month medcheck or call sooner for problems/concerns  10% - bad control"> 10% - bad control,Hemoglobin A1c (HbA1c) greater than 10% indicating poor diabetic control,Haemoglobin A1c greater than 10% indicating poor diabetic control">   Diabetes Mellitus Type 2 in Adults, Ambulatory Care   GENERAL INFORMATION:   Diabetes mellitus type 2  is a disease that affects how your body uses glucose (sugar)  Insulin helps move sugar out of the blood so it can be used for energy  Normally, when the blood sugar level increases, the pancreas makes more insulin  Type 2 diabetes develops because either the body cannot make enough insulin, or it cannot use the insulin correctly  After many years, your pancreas may stop making insulin  Common symptoms include the following:   · More hunger or thirst than usual     · Frequent urination     · Weight loss without trying     · Blurred vision  Seek immediate care for the following symptoms:   · Severe abdominal pain, or pain that spreads to your back  You may also be vomiting  · Trouble staying awake or focusing    · Shaking or sweating    · Blurred or double vision    · Breath has a fruity, sweet smell    · Breathing is deep and labored, or rapid and shallow    · Heartbeat is fast and weak  Treatment for diabetes mellitus type 2  includes keeping your blood sugar at a normal level  You must eat the right foods, and exercise regularly  You may also need medicine if you cannot control your blood sugar level with nutrition and exercise  Manage diabetes mellitus type 2:   · Check your blood sugar level  You will be taught how to check a small drop of blood in a glucose monitor  Ask your healthcare provider when and how often to check during the day  Ask your healthcare provider what your blood sugar levels should be when you check them  · Keep track of carbohydrates (sugar and starchy foods)    Your blood sugar level can get too high if you eat too many carbohydrates  Your dietitian will help you plan meals and snacks that have the right amount of carbohydrates  · Eat low-fat foods  Some examples are skinless chicken and low-fat milk  · Eat less sodium (salt)  Some examples of high-sodium foods to limit are soy sauce, potato chips, and soup  Do not add salt to food you cook  Limit your use of table salt  · Eat high-fiber foods  Foods that are a good source of fiber include vegetables, whole grain bread, and beans  · Limit alcohol  Alcohol affects your blood sugar level and can make it harder to manage your diabetes  Women should limit alcohol to 1 drink a day  Men should limit alcohol to 2 drinks a day  A drink of alcohol is 12 ounces of beer, 5 ounces of wine, or 1½ ounces of liquor  · Get regular exercise  Exercise can help keep your blood sugar level steady, decrease your risk of heart disease, and help you lose weight  Exercise for at least 30 minutes, 5 days a week  Include muscle strengthening activities 2 days each week  Work with your healthcare provider to create an exercise plan  · Check your feet each day  for injuries or open sores  Ask your healthcare provider for activities you can do if you have an open sore  · Quit smoking  If you smoke, it is never too late to quit  Smoking can worsen the problems that may occur with diabetes  Ask your healthcare provider for information about how to stop smoking if you are having trouble quitting  · Ask about your weight:  Ask healthcare providers if you need to lose weight, and how much to lose  Ask them to help you with a weight loss program  Even a 10 to 15 pound weight loss can help you manage your blood sugar level  · Carry medical alert identification  Wear medical alert jewelry or carry a card that says you have diabetes  Ask your healthcare provider where to get these items  · Ask about vaccines    Diabetes puts you at risk of serious illness if you get the flu, pneumonia, or hepatitis  Ask your healthcare provider if you should get a flu, pneumonia, or hepatitis B vaccine, and when to get the vaccine  Follow up with your healthcare provider as directed:  Write down your questions so you remember to ask them during your visits  CARE AGREEMENT:   You have the right to help plan your care  Learn about your health condition and how it may be treated  Discuss treatment options with your caregivers to decide what care you want to receive  You always have the right to refuse treatment  The above information is an  only  It is not intended as medical advice for individual conditions or treatments  Talk to your doctor, nurse or pharmacist before following any medical regimen to see if it is safe and effective for you  © 2014 [address] is for End User's use only and may not be sold, redistributed or otherwise used for commercial purposes  All illustrations and images included in CareNotes® are the copyrighted property of [employer]  or Gerry Estrada

## 2022-02-02 ENCOUNTER — APPOINTMENT (OUTPATIENT)
Dept: LAB | Facility: CLINIC | Age: 62
End: 2022-02-02
Payer: MEDICARE

## 2022-02-02 DIAGNOSIS — E78.00 HYPERCHOLESTEREMIA: ICD-10-CM

## 2022-02-02 DIAGNOSIS — E66.3 OVERWEIGHT WITH BODY MASS INDEX (BMI) OF 29 TO 29.9 IN ADULT: ICD-10-CM

## 2022-02-02 DIAGNOSIS — E11.9 CONTROLLED TYPE 2 DIABETES MELLITUS WITHOUT COMPLICATION, WITHOUT LONG-TERM CURRENT USE OF INSULIN (HCC): ICD-10-CM

## 2022-02-02 DIAGNOSIS — M10.9 GOUT, UNSPECIFIED CAUSE, UNSPECIFIED CHRONICITY, UNSPECIFIED SITE: ICD-10-CM

## 2022-02-02 LAB
ALBUMIN SERPL BCP-MCNC: 3.9 G/DL (ref 3.5–5)
ALP SERPL-CCNC: 93 U/L (ref 46–116)
ALT SERPL W P-5'-P-CCNC: 18 U/L (ref 12–78)
ANION GAP SERPL CALCULATED.3IONS-SCNC: 5 MMOL/L (ref 4–13)
AST SERPL W P-5'-P-CCNC: 9 U/L (ref 5–45)
BASOPHILS # BLD AUTO: 0.03 THOUSANDS/ΜL (ref 0–0.1)
BASOPHILS NFR BLD AUTO: 0 % (ref 0–1)
BILIRUB SERPL-MCNC: 0.66 MG/DL (ref 0.2–1)
BILIRUB UR QL STRIP: NEGATIVE
BUN SERPL-MCNC: 17 MG/DL (ref 5–25)
CALCIUM SERPL-MCNC: 9.3 MG/DL (ref 8.3–10.1)
CHLORIDE SERPL-SCNC: 105 MMOL/L (ref 100–108)
CHOLEST SERPL-MCNC: 247 MG/DL
CLARITY UR: CLEAR
CO2 SERPL-SCNC: 25 MMOL/L (ref 21–32)
COLOR UR: YELLOW
CREAT SERPL-MCNC: 1.05 MG/DL (ref 0.6–1.3)
CREAT UR-MCNC: 147 MG/DL
EOSINOPHIL # BLD AUTO: 0.07 THOUSAND/ΜL (ref 0–0.61)
EOSINOPHIL NFR BLD AUTO: 1 % (ref 0–6)
ERYTHROCYTE [DISTWIDTH] IN BLOOD BY AUTOMATED COUNT: 13.1 % (ref 11.6–15.1)
GFR SERPL CREATININE-BSD FRML MDRD: 76 ML/MIN/1.73SQ M
GLUCOSE P FAST SERPL-MCNC: 100 MG/DL (ref 65–99)
GLUCOSE UR STRIP-MCNC: NEGATIVE MG/DL
HCT VFR BLD AUTO: 40.5 % (ref 36.5–49.3)
HDLC SERPL-MCNC: 43 MG/DL
HGB BLD-MCNC: 13.1 G/DL (ref 12–17)
HGB UR QL STRIP.AUTO: NEGATIVE
IMM GRANULOCYTES # BLD AUTO: 0.06 THOUSAND/UL (ref 0–0.2)
IMM GRANULOCYTES NFR BLD AUTO: 1 % (ref 0–2)
KETONES UR STRIP-MCNC: NEGATIVE MG/DL
LDLC SERPL CALC-MCNC: 182 MG/DL (ref 0–100)
LEUKOCYTE ESTERASE UR QL STRIP: NEGATIVE
LYMPHOCYTES # BLD AUTO: 3.06 THOUSANDS/ΜL (ref 0.6–4.47)
LYMPHOCYTES NFR BLD AUTO: 28 % (ref 14–44)
MCH RBC QN AUTO: 31.7 PG (ref 26.8–34.3)
MCHC RBC AUTO-ENTMCNC: 32.3 G/DL (ref 31.4–37.4)
MCV RBC AUTO: 98 FL (ref 82–98)
MICROALBUMIN UR-MCNC: 9.4 MG/L (ref 0–20)
MICROALBUMIN/CREAT 24H UR: 6 MG/G CREATININE (ref 0–30)
MONOCYTES # BLD AUTO: 0.6 THOUSAND/ΜL (ref 0.17–1.22)
MONOCYTES NFR BLD AUTO: 5 % (ref 4–12)
NEUTROPHILS # BLD AUTO: 7.26 THOUSANDS/ΜL (ref 1.85–7.62)
NEUTS SEG NFR BLD AUTO: 65 % (ref 43–75)
NITRITE UR QL STRIP: NEGATIVE
NONHDLC SERPL-MCNC: 204 MG/DL
NRBC BLD AUTO-RTO: 0 /100 WBCS
PH UR STRIP.AUTO: 5.5 [PH]
PLATELET # BLD AUTO: 261 THOUSANDS/UL (ref 149–390)
PMV BLD AUTO: 11.1 FL (ref 8.9–12.7)
POTASSIUM SERPL-SCNC: 4.3 MMOL/L (ref 3.5–5.3)
PROT SERPL-MCNC: 8.3 G/DL (ref 6.4–8.2)
PROT UR STRIP-MCNC: NEGATIVE MG/DL
RBC # BLD AUTO: 4.13 MILLION/UL (ref 3.88–5.62)
SODIUM SERPL-SCNC: 135 MMOL/L (ref 136–145)
SP GR UR STRIP.AUTO: >=1.03 (ref 1–1.03)
TRIGL SERPL-MCNC: 111 MG/DL
URATE SERPL-MCNC: 6.2 MG/DL (ref 4.2–8)
UROBILINOGEN UR QL STRIP.AUTO: 0.2 E.U./DL
WBC # BLD AUTO: 11.08 THOUSAND/UL (ref 4.31–10.16)

## 2022-02-02 PROCEDURE — 82570 ASSAY OF URINE CREATININE: CPT | Performed by: NURSE PRACTITIONER

## 2022-02-02 PROCEDURE — 80061 LIPID PANEL: CPT

## 2022-02-02 PROCEDURE — 83036 HEMOGLOBIN GLYCOSYLATED A1C: CPT

## 2022-02-02 PROCEDURE — 85025 COMPLETE CBC W/AUTO DIFF WBC: CPT

## 2022-02-02 PROCEDURE — 84550 ASSAY OF BLOOD/URIC ACID: CPT

## 2022-02-02 PROCEDURE — 80053 COMPREHEN METABOLIC PANEL: CPT

## 2022-02-02 PROCEDURE — 81003 URINALYSIS AUTO W/O SCOPE: CPT

## 2022-02-02 PROCEDURE — 36415 COLL VENOUS BLD VENIPUNCTURE: CPT

## 2022-02-02 PROCEDURE — 82043 UR ALBUMIN QUANTITATIVE: CPT | Performed by: NURSE PRACTITIONER

## 2022-02-03 LAB
EST. AVERAGE GLUCOSE BLD GHB EST-MCNC: 123 MG/DL
HBA1C MFR BLD: 5.9 %

## 2022-02-21 DIAGNOSIS — E78.2 MIXED HYPERLIPIDEMIA: ICD-10-CM

## 2022-02-21 DIAGNOSIS — I10 UNCONTROLLED HYPERTENSION: ICD-10-CM

## 2022-02-21 RX ORDER — ATORVASTATIN CALCIUM 10 MG/1
10 TABLET, FILM COATED ORAL
Qty: 90 TABLET | Refills: 3 | Status: SHIPPED | OUTPATIENT
Start: 2022-02-21

## 2022-02-21 NOTE — TELEPHONE ENCOUNTER
Pharmacy Called for refill for medication    Patient requesting refill(s) of:  Lipitor 10 mg  Last filled:5/11/21  Last appt:2/1/22  Next appt:8/1/22  Pharmacy:[facility]

## 2022-02-24 DIAGNOSIS — I10 UNCONTROLLED HYPERTENSION: ICD-10-CM

## 2022-02-24 RX ORDER — LISINOPRIL 20 MG/1
20 TABLET ORAL DAILY
Qty: 90 TABLET | Refills: 1 | Status: SHIPPED | OUTPATIENT
Start: 2022-02-24

## 2022-02-24 NOTE — TELEPHONE ENCOUNTER
Pt called stating he does need refill on Lisinopril   Was taking two 10 mg tablets, would like a script for 20 mg sent to [facility] for approval

## 2022-03-11 DIAGNOSIS — Z71.6 ENCOUNTER FOR TOBACCO USE CESSATION COUNSELING: Primary | ICD-10-CM

## 2022-03-11 NOTE — TELEPHONE ENCOUNTER
Called to gather further information from the patient he stated that he would like to have the medication sent to [facility] pharm if anything is sent out, he mainly was unsure of what he should be taking to help with stopping smoking,

## 2022-03-11 NOTE — TELEPHONE ENCOUNTER
Patient left a message at the office due to still not feeling well after he eats and he really thinks its due to his smoking   Patient willing to try something to quit smoking, any questions call [phone]

## 2022-03-14 RX ORDER — BUPROPION HYDROCHLORIDE 100 MG/1
100 TABLET, EXTENDED RELEASE ORAL 2 TIMES DAILY
Qty: 60 TABLET | Refills: 1 | Status: SHIPPED | OUTPATIENT
Start: 2022-03-14

## 2022-03-14 NOTE — TELEPHONE ENCOUNTER
Called and spoke with patient  He is willing to try Wellbutrin again, [facility]  Patient states he is getting a upset stomach after smoking which he thinks it's because he has been wearing a nicotine patch and then smoking  Please approve medication

## 2022-03-28 ENCOUNTER — OFFICE VISIT (OUTPATIENT)
Dept: OBGYN CLINIC | Facility: CLINIC | Age: 62
End: 2022-03-28
Payer: MEDICARE

## 2022-03-28 VITALS
WEIGHT: 218 LBS | HEART RATE: 97 BPM | HEIGHT: 70 IN | SYSTOLIC BLOOD PRESSURE: 128 MMHG | DIASTOLIC BLOOD PRESSURE: 96 MMHG | BODY MASS INDEX: 31.21 KG/M2

## 2022-03-28 DIAGNOSIS — M25.462 EFFUSION OF LEFT KNEE: ICD-10-CM

## 2022-03-28 DIAGNOSIS — M17.12 PRIMARY OSTEOARTHRITIS OF LEFT KNEE: Primary | ICD-10-CM

## 2022-03-28 PROCEDURE — 99213 OFFICE O/P EST LOW 20 MIN: CPT | Performed by: PHYSICIAN ASSISTANT

## 2022-03-28 PROCEDURE — 20610 DRAIN/INJ JOINT/BURSA W/O US: CPT | Performed by: PHYSICIAN ASSISTANT

## 2022-03-28 RX ORDER — BUPIVACAINE HYDROCHLORIDE 2.5 MG/ML
4 INJECTION, SOLUTION INFILTRATION; PERINEURAL
Status: COMPLETED | OUTPATIENT
Start: 2022-03-28 | End: 2022-03-28

## 2022-03-28 RX ORDER — TRIAMCINOLONE ACETONIDE 40 MG/ML
80 INJECTION, SUSPENSION INTRA-ARTICULAR; INTRAMUSCULAR
Status: COMPLETED | OUTPATIENT
Start: 2022-03-28 | End: 2022-03-28

## 2022-03-28 RX ADMIN — TRIAMCINOLONE ACETONIDE 80 MG: 40 INJECTION, SUSPENSION INTRA-ARTICULAR; INTRAMUSCULAR at 15:29

## 2022-03-28 RX ADMIN — BUPIVACAINE HYDROCHLORIDE 4 ML: 2.5 INJECTION, SOLUTION INFILTRATION; PERINEURAL at 15:29

## 2022-03-28 NOTE — PROGRESS NOTES
ASSESSMENT/PLAN:    Diagnoses and all orders for this visit:    Effusion of left knee    Primary osteoarthritis of left knee    Other orders  -     Large joint arthrocentesis        The patient's left knee was aspirated for 55 cc of clear, synovial fluid and then injected with Kenalog and Marcaine  He tolerated the procedure quite well  He will follow up with our office in 3 months  The patient is acceptable to this plan  Return in about 3 months (around 6/28/2022)  Under aseptic technique, the left knee was aspirated of 55 cc of clear synovial fluid and injected with Kenalog and Marcaine  He tolerated procedure quite well  Return back in 3 months for re-evaluation  If his condition changes, he will not hesitate to let us know      _____________________________________________________  CHIEF COMPLAINT:  Chief Complaint   Patient presents with    Left Knee - Follow-up         SUBJECTIVE:  Amira Craig is a 62 y o  male who presents to our office complaining of left knee pain and swelling  The patient has a history of primary osteoarthritis of his left knee  In the past his knee was aspirated  He denies any new falls or trauma  He denies any numbness or tingling  He denies any fever or chills  The following portions of the patient's history were reviewed and updated as appropriate: allergies, current medications, past family history, past medical history, past social history, past surgical history and problem list     PAST MEDICAL HISTORY:  Past Medical History:   Diagnosis Date    Gout     Osteoarthritis        PAST SURGICAL HISTORY:  History reviewed  No pertinent surgical history      FAMILY HISTORY:  Family History   Problem Relation Age of Onset    Arthritis Mother     Coronary artery disease Father        SOCIAL HISTORY:  Social History     Tobacco Use    Smoking status: Current Every Day Smoker     Packs/day: 3 00     Years: 15 00     Pack years: 45 00     Types: Cigarettes    Smokeless tobacco: Never Used    Tobacco comment: not as much   Vaping Use    Vaping Use: Never used   Substance Use Topics    Alcohol use: Not Currently     Comment: occasionally    Drug use: No       MEDICATIONS:    Current Outpatient Medications:     atorvastatin (LIPITOR) 10 mg tablet, Take 1 tablet (10 mg total) by mouth daily at bedtime, Disp: 90 tablet, Rfl: 3    buPROPion (Wellbutrin SR) 100 mg 12 hr tablet, Take 1 tablet (100 mg total) by mouth 2 (two) times a day, Disp: 60 tablet, Rfl: 1    lisinopril (ZESTRIL) 20 mg tablet, Take 1 tablet (20 mg total) by mouth daily, Disp: 90 tablet, Rfl: 1    Microlet Lancets MISC, Use 2 (two) times a day, Disp: 100 each, Rfl: 2    naproxen (Naprosyn) 500 mg tablet, Take 1 tablet (500 mg total) by mouth 2 (two) times a day with meals, Disp: 180 tablet, Rfl: 3    ondansetron (ZOFRAN) 4 mg tablet, Take 1 tablet (4 mg total) by mouth every 8 (eight) hours as needed for nausea or vomiting, Disp: 20 tablet, Rfl: 1    colchicine (COLCRYS) 0 6 mg tablet, 1 2mg PO x 1 then 0 6mg PO 1 hour later x 1  Total of 3 pills per attack  May not repeat for 3 days (Patient not taking: Reported on 1/11/2022 ), Disp: 3 tablet, Rfl: 2    glucose blood test strip, Use as instructed (Patient not taking: Reported on 1/28/2022 ), Disp: 100 each, Rfl: 3    glucose monitoring kit (FREESTYLE) monitoring kit, Use 1 each 2 (two) times a day before meals (Patient not taking: Reported on 1/28/2022 ), Disp: 1 each, Rfl: 0    Lancets (freestyle) lancets, Use as instructed (Patient not taking: Reported on 1/28/2022 ), Disp: 100 each, Rfl: 3    ALLERGIES:  No Known Allergies    ROS:  Review of Systems     Constitutional: Negative for fatigue, fever or loss of appetite  HENT: Negative  Respiratory: Negative for shortness of breath, dyspnea  Cardiovascular: Negative for chest pain/tightness  Gastrointestinal: Negative for abdominal pain, N/V     Endocrine: Negative for cold/heat intolerance, unexplained weight loss/gain  Genitourinary: Negative for flank pain, dysuria, hematuria  Musculoskeletal: Positive for arthralgia   Skin: Negative for rash  Neurological: Negative for numbness or tingling  Psychiatric/Behavioral: Negative for agitation  _____________________________________________________  PHYSICAL EXAMINATION:    Blood pressure 128/96, pulse 97, height 5' 10" (1 778 m), weight 98 9 kg (218 lb)  Constitutional: Oriented to person, place, and time  Appears well-developed and well-nourished  No distress  HENT:   Head: Normocephalic  Eyes: Conjunctivae are normal  Right eye exhibits no discharge  Left eye exhibits no discharge  No scleral icterus  Cardiovascular: Normal rate  Pulmonary/Chest: Effort normal    Neurological: Alert and oriented to person, place, and time  Skin: Skin is warm and dry  No rash noted  Not diaphoretic  No erythema  No pallor  Psychiatric: Normal mood and affect  Behavior is normal  Judgment and thought content normal       MUSCULOSKELETAL EXAMINATION:   Physical Exam  Ortho Exam    Left lower extremity is neurovascularly intact  Toes are pink and mobile   Compartments are soft  No warmth, erythema or ecchymosis  ROM of knee is from 5-115 degrees  Negative Lachman, drawer or pivot shift  No medial instability  Medial joint line tenderness, slight lateral joint line tenderness  Patellofemoral crepitation  There is an effusion present  Objective:  BP Readings from Last 1 Encounters:   03/28/22 128/96      Wt Readings from Last 1 Encounters:   03/28/22 98 9 kg (218 lb)        BMI:   Estimated body mass index is 31 28 kg/m² as calculated from the following:    Height as of this encounter: 5' 10" (1 778 m)  Weight as of this encounter: 98 9 kg (218 lb)        PROCEDURES PERFORMED:  Large joint arthrocentesis: L knee  Universal Protocol:  Risks and benefits: risks, benefits and alternatives were discussed  Consent given by: patient  Patient understanding: patient states understanding of the procedure being performed  Site marked: the operative site was marked  Patient identity confirmed: verbally with patient    Supporting Documentation  Indications: pain   Procedure Details  Location: knee - L knee  Preparation: Patient was prepped and draped in the usual sterile fashion  Needle size: 22 G  Ultrasound guidance: no  Approach: lateral  Medications administered: 4 mL bupivacaine 0 25 %; 80 mg triamcinolone acetonide 40 mg/mL    Aspirate amount: 55 mL  Aspirate: clear and yellow    Patient tolerance: patient tolerated the procedure well with no immediate complications  Dressing:  Sterile dressing applied            Scribe Attestation    I,:  Allison Ontiveros PA-C am acting as a scribe while in the presence of the attending physician :       I,:  Michelle Vang, DO personally performed the services described in this documentation    as scribed in my presence :

## 2022-04-13 ENCOUNTER — CLINICAL SUPPORT (OUTPATIENT)
Dept: URGENT CARE | Facility: CLINIC | Age: 62
End: 2022-04-13

## 2022-04-13 ENCOUNTER — OFFICE VISIT (OUTPATIENT)
Dept: FAMILY MEDICINE CLINIC | Facility: CLINIC | Age: 62
End: 2022-04-13
Payer: MEDICARE

## 2022-04-13 VITALS
TEMPERATURE: 98.3 F | HEART RATE: 92 BPM | OXYGEN SATURATION: 97 % | BODY MASS INDEX: 31.64 KG/M2 | SYSTOLIC BLOOD PRESSURE: 138 MMHG | WEIGHT: 221 LBS | DIASTOLIC BLOOD PRESSURE: 78 MMHG | HEIGHT: 70 IN

## 2022-04-13 DIAGNOSIS — R00.0 TACHYCARDIA: ICD-10-CM

## 2022-04-13 DIAGNOSIS — R51.9 CHRONIC NONINTRACTABLE HEADACHE, UNSPECIFIED HEADACHE TYPE: ICD-10-CM

## 2022-04-13 DIAGNOSIS — R11.0 NAUSEA: ICD-10-CM

## 2022-04-13 DIAGNOSIS — R20.0 NUMBNESS: ICD-10-CM

## 2022-04-13 DIAGNOSIS — I10 ESSENTIAL HYPERTENSION: ICD-10-CM

## 2022-04-13 DIAGNOSIS — G89.29 CHRONIC NONINTRACTABLE HEADACHE, UNSPECIFIED HEADACHE TYPE: ICD-10-CM

## 2022-04-13 DIAGNOSIS — I10 ESSENTIAL HYPERTENSION: Primary | ICD-10-CM

## 2022-04-13 PROBLEM — G44.89 OTHER HEADACHE SYNDROME: Status: ACTIVE | Noted: 2022-04-13

## 2022-04-13 PROCEDURE — 99214 OFFICE O/P EST MOD 30 MIN: CPT | Performed by: NURSE PRACTITIONER

## 2022-04-13 RX ORDER — ONDANSETRON 4 MG/1
4 TABLET, FILM COATED ORAL EVERY 8 HOURS PRN
Qty: 20 TABLET | Refills: 1 | Status: SHIPPED | OUTPATIENT
Start: 2022-04-13

## 2022-04-13 NOTE — TELEPHONE ENCOUNTER
Patient requesting refill(s) of: Zofran 4mg    Last filled: 2/1/22 #20 x1  Last appt: 2/1/22  Next appt: 4/13/22  Pharmacy: [facility]

## 2022-04-14 ENCOUNTER — TELEPHONE (OUTPATIENT)
Dept: NEUROLOGY | Facility: CLINIC | Age: 62
End: 2022-04-14

## 2022-04-14 NOTE — TELEPHONE ENCOUNTER
Patient calling to schedule appointment for numbness in head that makes him eventually vomit  No testing done  Triage intake sent

## 2022-04-21 ENCOUNTER — TELEPHONE (OUTPATIENT)
Dept: FAMILY MEDICINE CLINIC | Facility: CLINIC | Age: 62
End: 2022-04-21

## 2022-04-21 NOTE — TELEPHONE ENCOUNTER
[facility] Sent Request For more information for need for Brain CT Scan on Patient   Scanned and put in PCP folder in Back of Office

## 2022-04-25 ENCOUNTER — OFFICE VISIT (OUTPATIENT)
Dept: OBGYN CLINIC | Facility: CLINIC | Age: 62
End: 2022-04-25
Payer: MEDICARE

## 2022-04-25 VITALS
HEIGHT: 70 IN | DIASTOLIC BLOOD PRESSURE: 86 MMHG | SYSTOLIC BLOOD PRESSURE: 156 MMHG | HEART RATE: 96 BPM | BODY MASS INDEX: 31.71 KG/M2

## 2022-04-25 DIAGNOSIS — M25.462 EFFUSION OF LEFT KNEE: Primary | ICD-10-CM

## 2022-04-25 DIAGNOSIS — M17.12 PRIMARY OSTEOARTHRITIS OF LEFT KNEE: ICD-10-CM

## 2022-04-25 PROCEDURE — 99213 OFFICE O/P EST LOW 20 MIN: CPT | Performed by: ORTHOPAEDIC SURGERY

## 2022-04-25 PROCEDURE — 20610 DRAIN/INJ JOINT/BURSA W/O US: CPT | Performed by: ORTHOPAEDIC SURGERY

## 2022-04-25 RX ORDER — TRIAMCINOLONE ACETONIDE 40 MG/ML
80 INJECTION, SUSPENSION INTRA-ARTICULAR; INTRAMUSCULAR
Status: COMPLETED | OUTPATIENT
Start: 2022-04-25 | End: 2022-04-25

## 2022-04-25 RX ORDER — BUPIVACAINE HYDROCHLORIDE 2.5 MG/ML
4 INJECTION, SOLUTION INFILTRATION; PERINEURAL
Status: COMPLETED | OUTPATIENT
Start: 2022-04-25 | End: 2022-04-25

## 2022-04-25 RX ADMIN — BUPIVACAINE HYDROCHLORIDE 4 ML: 2.5 INJECTION, SOLUTION INFILTRATION; PERINEURAL at 12:05

## 2022-04-25 RX ADMIN — TRIAMCINOLONE ACETONIDE 80 MG: 40 INJECTION, SUSPENSION INTRA-ARTICULAR; INTRAMUSCULAR at 12:05

## 2022-04-25 NOTE — PROGRESS NOTES
Assessment:  1  Effusion of left knee     2  Primary osteoarthritis of left knee         Plan:  Left knee effusion  The patient was provided with left knee steroid injection following aspiration of 51ml fluid  The patient tolerated the procedure well  The patient should follow up in 3 months  Under aseptic technique, the left knee was aspirated of 51 cc of clear synovial fluid  It was injected with Kenalog and Marcaine  He tolerated procedure quite well  Return back in 3 months evaluation  If his condition changes, he will not hesitate to let us know  Physical exam shows diffuse medial lateral joint tenderness and patellofemoral crepitation  There is a painful arc of motion throughout his left knee  No warmth or erythema      To do next visit:  Return in about 3 months (around 7/25/2022)  The above stated was discussed in layman's terms and the patient expressed understanding  All questions were answered to the patient's satisfaction  Scribe Attestation    I,:  Deisy Tillman am acting as a scribe while in the presence of the attending physician :       I,:  Arben Valentine, DO personally performed the services described in this documentation    as scribed in my presence :             Subjective:   Lucille Orta is a 65 y o  male who presents for follow up of left knee  He is s/p left knee aspiration and steroid injection with benefit, 3/28/2022  Today he complains of increase of left knee pain and swelling  Prolonged weight bearing and repetitive bending aggravates while rest alleviates  Review of systems negative unless otherwise specified in HPI    Past Medical History:   Diagnosis Date    Gout     Osteoarthritis        History reviewed  No pertinent surgical history      Family History   Problem Relation Age of Onset    Arthritis Mother     Coronary artery disease Father        Social History     Occupational History    Not on file   Tobacco Use    Smoking status: Current Every Day Smoker     Packs/day: 3 00     Years: 15 00     Pack years: 45 00     Types: Cigarettes    Smokeless tobacco: Never Used    Tobacco comment: not as much   Vaping Use    Vaping Use: Never used   Substance and Sexual Activity    Alcohol use: Not Currently     Comment: occasionally    Drug use: No    Sexual activity: Not on file         Current Outpatient Medications:     atorvastatin (LIPITOR) 10 mg tablet, Take 1 tablet (10 mg total) by mouth daily at bedtime, Disp: 90 tablet, Rfl: 3    buPROPion (Wellbutrin SR) 100 mg 12 hr tablet, Take 1 tablet (100 mg total) by mouth 2 (two) times a day, Disp: 60 tablet, Rfl: 1    lisinopril (ZESTRIL) 20 mg tablet, Take 1 tablet (20 mg total) by mouth daily, Disp: 90 tablet, Rfl: 1    Microlet Lancets MISC, Use 2 (two) times a day, Disp: 100 each, Rfl: 2    naproxen (Naprosyn) 500 mg tablet, Take 1 tablet (500 mg total) by mouth 2 (two) times a day with meals, Disp: 180 tablet, Rfl: 3    ondansetron (ZOFRAN) 4 mg tablet, Take 1 tablet (4 mg total) by mouth every 8 (eight) hours as needed for nausea or vomiting, Disp: 20 tablet, Rfl: 1    colchicine (COLCRYS) 0 6 mg tablet, 1 2mg PO x 1 then 0 6mg PO 1 hour later x 1  Total of 3 pills per attack   May not repeat for 3 days (Patient not taking: Reported on 1/11/2022 ), Disp: 3 tablet, Rfl: 2    glucose blood test strip, Use as instructed (Patient not taking: Reported on 1/28/2022 ), Disp: 100 each, Rfl: 3    glucose monitoring kit (FREESTYLE) monitoring kit, Use 1 each 2 (two) times a day before meals (Patient not taking: Reported on 1/28/2022 ), Disp: 1 each, Rfl: 0    Lancets (freestyle) lancets, Use as instructed (Patient not taking: Reported on 1/28/2022 ), Disp: 100 each, Rfl: 3    No Known Allergies         Vitals:    04/25/22 1154   BP: 156/86   Pulse: 96       Objective:  Physical exam  · General: Awake, Alert, Oriented  · Eyes: Pupils equal, round and reactive to light  · Heart: regular rate and rhythm  · Lungs: No audible wheezing  · Abdomen: soft                    Ortho Exam  Left knee:  No erythema or ecchymosis  Modest effusion   No swelling  Normal strength  Good ROM   Calf compartments soft and supple  Sensation intact  Toes are warm sensate and mobile        Diagnostics, reviewed and taken today if performed as documented:    None performed     Procedures, if performed today:    Large joint arthrocentesis: L knee  Universal Protocol:  Consent: Verbal consent obtained  Risks and benefits: risks, benefits and alternatives were discussed  Consent given by: patient  Time out: Immediately prior to procedure a "time out" was called to verify the correct patient, procedure, equipment, support staff and site/side marked as required  Timeout called at: 4/25/2022 12:03 PM   Patient understanding: patient states understanding of the procedure being performed  Site marked: the operative site was marked  Patient identity confirmed: verbally with patient    Supporting Documentation  Indications: pain   Procedure Details  Location: knee - L knee  Preparation: Patient was prepped and draped in the usual sterile fashion  Needle size: 22 G  Ultrasound guidance: no  Approach: anterolateral  Medications administered: 4 mL bupivacaine 0 25 %; 80 mg triamcinolone acetonide 40 mg/mL    Aspirate amount: 51 mL  Aspirate: clear and yellow    Patient tolerance: patient tolerated the procedure well with no immediate complications  Dressing:  Sterile dressing applied            Portions of the record may have been created with voice recognition software  Occasional wrong word or "sound a like" substitutions may have occurred due to the inherent limitations of voice recognition software  Read the chart carefully and recognize, using context, where substitutions have occurred

## 2022-05-19 ENCOUNTER — OFFICE VISIT (OUTPATIENT)
Dept: OBGYN CLINIC | Facility: CLINIC | Age: 62
End: 2022-05-19
Payer: MEDICARE

## 2022-05-19 VITALS — BODY MASS INDEX: 31.64 KG/M2 | WEIGHT: 221 LBS | HEIGHT: 70 IN

## 2022-05-19 DIAGNOSIS — M25.462 EFFUSION OF LEFT KNEE: Primary | ICD-10-CM

## 2022-05-19 DIAGNOSIS — M17.12 PRIMARY OSTEOARTHRITIS OF LEFT KNEE: ICD-10-CM

## 2022-05-19 PROCEDURE — 20610 DRAIN/INJ JOINT/BURSA W/O US: CPT | Performed by: PHYSICIAN ASSISTANT

## 2022-05-19 PROCEDURE — 99213 OFFICE O/P EST LOW 20 MIN: CPT | Performed by: PHYSICIAN ASSISTANT

## 2022-05-19 NOTE — PROGRESS NOTES
ASSESSMENT/PLAN:    Diagnoses and all orders for this visit:    Effusion of left knee    Primary osteoarthritis of left knee    Other orders  -     Large joint arthrocentesis      The patient's left knee was aspirated for 70 cc of clear synovial fluid  He tolerated the procedure quite well  His knee was then wrapped with an Ace wrap  He will follow up with our office in 2 months  The patient is acceptable to this plan  Return in about 2 months (around 7/19/2022)  _____________________________________________________  CHIEF COMPLAINT:  Chief Complaint   Patient presents with    Left Knee - Swelling         SUBJECTIVE:  Tiny Johnson is a 65 y o  male who presents to our office complaining of left knee swelling  The patient has a history of primary osteoarthritis of his left knee with recurrent left knee effusions  Last visit, the patient's left knee was aspirated for 51 cc of clear synovial fluid and then injected with Kenalog and Marcaine  He complains of left knee pain with decreased range of motion today  He denies any numbness or tingling  He denies any fever or chills  The following portions of the patient's history were reviewed and updated as appropriate: allergies, current medications, past family history, past medical history, past social history, past surgical history and problem list     PAST MEDICAL HISTORY:  Past Medical History:   Diagnosis Date    Gout     Osteoarthritis        PAST SURGICAL HISTORY:  History reviewed  No pertinent surgical history      FAMILY HISTORY:  Family History   Problem Relation Age of Onset    Arthritis Mother     Coronary artery disease Father        SOCIAL HISTORY:  Social History     Tobacco Use    Smoking status: Current Every Day Smoker     Packs/day: 3 00     Years: 15 00     Pack years: 45 00     Types: Cigarettes    Smokeless tobacco: Never Used    Tobacco comment: not as much   Vaping Use    Vaping Use: Never used   Substance Use Topics    Alcohol use: Not Currently     Comment: occasionally    Drug use: No       MEDICATIONS:    Current Outpatient Medications:     atorvastatin (LIPITOR) 10 mg tablet, Take 1 tablet (10 mg total) by mouth daily at bedtime, Disp: 90 tablet, Rfl: 3    buPROPion (Wellbutrin SR) 100 mg 12 hr tablet, Take 1 tablet (100 mg total) by mouth 2 (two) times a day, Disp: 60 tablet, Rfl: 1    lisinopril (ZESTRIL) 20 mg tablet, Take 1 tablet (20 mg total) by mouth daily, Disp: 90 tablet, Rfl: 1    Microlet Lancets MISC, Use 2 (two) times a day, Disp: 100 each, Rfl: 2    naproxen (Naprosyn) 500 mg tablet, Take 1 tablet (500 mg total) by mouth 2 (two) times a day with meals, Disp: 180 tablet, Rfl: 3    ondansetron (ZOFRAN) 4 mg tablet, Take 1 tablet (4 mg total) by mouth every 8 (eight) hours as needed for nausea or vomiting, Disp: 20 tablet, Rfl: 1    colchicine (COLCRYS) 0 6 mg tablet, 1 2mg PO x 1 then 0 6mg PO 1 hour later x 1  Total of 3 pills per attack  May not repeat for 3 days (Patient not taking: No sig reported), Disp: 3 tablet, Rfl: 2    glucose blood test strip, Use as instructed (Patient not taking: No sig reported), Disp: 100 each, Rfl: 3    glucose monitoring kit (FREESTYLE) monitoring kit, Use 1 each 2 (two) times a day before meals (Patient not taking: No sig reported), Disp: 1 each, Rfl: 0    Lancets (freestyle) lancets, Use as instructed (Patient not taking: No sig reported), Disp: 100 each, Rfl: 3    ALLERGIES:  No Known Allergies    ROS:  Review of Systems     Constitutional: Negative for fatigue, fever or loss of appetite  HENT: Negative  Respiratory: Negative for shortness of breath, dyspnea  Cardiovascular: Negative for chest pain/tightness  Gastrointestinal: Negative for abdominal pain, N/V  Endocrine: Negative for cold/heat intolerance, unexplained weight loss/gain  Genitourinary: Negative for flank pain, dysuria, hematuria     Musculoskeletal: Positive for arthralgia   Skin: Negative for rash  Neurological: Negative for numbness or tingling  Psychiatric/Behavioral: Negative for agitation  _____________________________________________________  PHYSICAL EXAMINATION:    Height 5' 10" (1 778 m), weight 100 kg (221 lb)  Constitutional: Oriented to person, place, and time  Appears well-developed and well-nourished  No distress  HENT:   Head: Normocephalic  Eyes: Conjunctivae are normal  Right eye exhibits no discharge  Left eye exhibits no discharge  No scleral icterus  Cardiovascular: Normal rate  Pulmonary/Chest: Effort normal    Neurological: Alert and oriented to person, place, and time  Skin: Skin is warm and dry  No rash noted  Not diaphoretic  No erythema  No pallor  Psychiatric: Normal mood and affect  Behavior is normal  Judgment and thought content normal       MUSCULOSKELETAL EXAMINATION:   Physical Exam  Ortho Exam    Left lower extremity is neurovascularly intact  Toes are pink and mobile   Compartments are soft  No warmth, erythema or ecchymosis  Large effusion present  ROM of knee improved to 5-115 degrees after aspiration  Negative Lachman, drawer or pivot shift  No medial instability  Medial joint line tenderness, slight lateral joint line tenderness  Patellofemoral crepitation  Objective:  BP Readings from Last 1 Encounters:   04/25/22 156/86      Wt Readings from Last 1 Encounters:   05/19/22 100 kg (221 lb)        BMI:   Estimated body mass index is 31 71 kg/m² as calculated from the following:    Height as of this encounter: 5' 10" (1 778 m)  Weight as of this encounter: 100 kg (221 lb)  PROCEDURES PERFORMED:  Large joint arthrocentesis: L knee  Universal Protocol:  Consent given by: patient  Time out: Immediately prior to procedure a "time out" was called to verify the correct patient, procedure, equipment, support staff and site/side marked as required    Patient understanding: patient states understanding of the procedure being performed  Site marked: the operative site was marked  Patient identity confirmed: verbally with patient    Supporting Documentation  Indications: pain   Procedure Details  Location: knee - L knee  Preparation: Patient was prepped and draped in the usual sterile fashion  Needle size: 22 G  Ultrasound guidance: no  Approach: lateral    Aspirate amount: 70 mL  Aspirate: yellow and clear    Patient tolerance: patient tolerated the procedure well with no immediate complications  Dressing:  Sterile dressing applied

## 2022-05-23 ENCOUNTER — OFFICE VISIT (OUTPATIENT)
Dept: OBGYN CLINIC | Facility: CLINIC | Age: 62
End: 2022-05-23
Payer: MEDICARE

## 2022-05-23 VITALS
WEIGHT: 221 LBS | HEART RATE: 94 BPM | DIASTOLIC BLOOD PRESSURE: 83 MMHG | SYSTOLIC BLOOD PRESSURE: 146 MMHG | BODY MASS INDEX: 31.64 KG/M2 | HEIGHT: 70 IN

## 2022-05-23 DIAGNOSIS — S83.204D OTHER TEAR OF MENISCUS OF LEFT KNEE, UNSPECIFIED MENISCUS, UNSPECIFIED WHETHER OLD OR CURRENT TEAR, SUBSEQUENT ENCOUNTER: ICD-10-CM

## 2022-05-23 DIAGNOSIS — M25.462 EFFUSION OF LEFT KNEE: Primary | ICD-10-CM

## 2022-05-23 PROCEDURE — 20610 DRAIN/INJ JOINT/BURSA W/O US: CPT | Performed by: ORTHOPAEDIC SURGERY

## 2022-05-23 PROCEDURE — 99213 OFFICE O/P EST LOW 20 MIN: CPT | Performed by: ORTHOPAEDIC SURGERY

## 2022-05-23 NOTE — PROGRESS NOTES
ASSESSMENT/PLAN:    Diagnoses and all orders for this visit:    Effusion of left knee  -     MRI knee left  wo contrast; Future    Other tear of meniscus of left knee, unspecified meniscus, unspecified whether old or current tear, subsequent encounter  -     MRI knee left  wo contrast; Future    Other orders  -     Large joint arthrocentesis        The patient's left knee was aspirated for 70 cc of clear synovial fluid  He tolerated the procedure quite well  I am concerned that the patient has a tear of his meniscus, as his recurrent knee effusions are becoming more frequent  We will order the MRI to rule out a meniscal tear  He will follow up with our office once the MRI is complete  The patient is acceptable to this plan  Return for MRI results  The patient's left knee was aspirated of 70 cc of clear synovial fluid  He has had multiple bouts of recurrent effusions  He has medial joint tenderness  An MRI is ordered to rule out a meniscal tear  Return back once this is complete  There is also a positive French's     _____________________________________________________  CHIEF COMPLAINT:  Chief Complaint   Patient presents with    Left Knee - Follow-up         SUBJECTIVE:  Juana Lizarraga is a 65 y o  male who presents to our office complaining of left knee pain and swelling  The patient was seen approximately 4 days ago and his left knee was aspirated for 70 cc of clear synovial fluid  He states that his knee effusion has returned  He denies any new falls or trauma  He does state that sometimes his knee feels like it wants to lock or give out on him  He denies any numbness or tingling  He denies any fever or chills        The following portions of the patient's history were reviewed and updated as appropriate: allergies, current medications, past family history, past medical history, past social history, past surgical history and problem list     PAST MEDICAL HISTORY:  Past Medical History: Diagnosis Date    Gout     Osteoarthritis        PAST SURGICAL HISTORY:  History reviewed  No pertinent surgical history  FAMILY HISTORY:  Family History   Problem Relation Age of Onset    Arthritis Mother     Coronary artery disease Father        SOCIAL HISTORY:  Social History     Tobacco Use    Smoking status: Current Every Day Smoker     Packs/day: 3 00     Years: 15 00     Pack years: 45 00     Types: Cigarettes    Smokeless tobacco: Never Used    Tobacco comment: not as much   Vaping Use    Vaping Use: Never used   Substance Use Topics    Alcohol use: Not Currently     Comment: occasionally    Drug use: No       MEDICATIONS:    Current Outpatient Medications:     atorvastatin (LIPITOR) 10 mg tablet, Take 1 tablet (10 mg total) by mouth daily at bedtime, Disp: 90 tablet, Rfl: 3    buPROPion (Wellbutrin SR) 100 mg 12 hr tablet, Take 1 tablet (100 mg total) by mouth 2 (two) times a day, Disp: 60 tablet, Rfl: 1    colchicine (COLCRYS) 0 6 mg tablet, 1 2mg PO x 1 then 0 6mg PO 1 hour later x 1  Total of 3 pills per attack   May not repeat for 3 days (Patient not taking: No sig reported), Disp: 3 tablet, Rfl: 2    glucose blood test strip, Use as instructed (Patient not taking: No sig reported), Disp: 100 each, Rfl: 3    glucose monitoring kit (FREESTYLE) monitoring kit, Use 1 each 2 (two) times a day before meals (Patient not taking: No sig reported), Disp: 1 each, Rfl: 0    Lancets (freestyle) lancets, Use as instructed (Patient not taking: No sig reported), Disp: 100 each, Rfl: 3    lisinopril (ZESTRIL) 20 mg tablet, Take 1 tablet (20 mg total) by mouth daily, Disp: 90 tablet, Rfl: 1    Microlet Lancets MISC, Use 2 (two) times a day, Disp: 100 each, Rfl: 2    naproxen (Naprosyn) 500 mg tablet, Take 1 tablet (500 mg total) by mouth 2 (two) times a day with meals, Disp: 180 tablet, Rfl: 3    ondansetron (ZOFRAN) 4 mg tablet, Take 1 tablet (4 mg total) by mouth every 8 (eight) hours as needed for nausea or vomiting, Disp: 20 tablet, Rfl: 1    ALLERGIES:  No Known Allergies    ROS:  Review of Systems     Constitutional: Negative for fatigue, fever or loss of appetite  HENT: Negative  Respiratory: Negative for shortness of breath, dyspnea  Cardiovascular: Negative for chest pain/tightness  Gastrointestinal: Negative for abdominal pain, N/V  Endocrine: Negative for cold/heat intolerance, unexplained weight loss/gain  Genitourinary: Negative for flank pain, dysuria, hematuria  Musculoskeletal: Positive for arthralgia   Skin: Negative for rash  Neurological: Negative for numbness or tingling  Psychiatric/Behavioral: Negative for agitation  _____________________________________________________  PHYSICAL EXAMINATION:    Blood pressure 146/83, pulse 94, height 5' 10" (1 778 m), weight 100 kg (221 lb)  Constitutional: Oriented to person, place, and time  Appears well-developed and well-nourished  No distress  HENT:   Head: Normocephalic  Eyes: Conjunctivae are normal  Right eye exhibits no discharge  Left eye exhibits no discharge  No scleral icterus  Cardiovascular: Normal rate  Pulmonary/Chest: Effort normal    Neurological: Alert and oriented to person, place, and time  Skin: Skin is warm and dry  No rash noted  Not diaphoretic  No erythema  No pallor  Psychiatric: Normal mood and affect   Behavior is normal  Judgment and thought content normal       MUSCULOSKELETAL EXAMINATION:   Physical Exam  Ortho Exam    Left lower extremity is neurovascularly intact  Toes are pink and mobile   Compartments are soft  No warmth, erythema or ecchymosis  ROM of knee is from 5-115 degrees  Negative Lachman, drawer or pivot shift  Positive French's  No medial instability  Medial joint line tenderness, slight lateral joint line tenderness  Patellofemoral crepitation  Objective:  BP Readings from Last 1 Encounters:   05/23/22 146/83      Wt Readings from Last 1 Encounters:   05/23/22 100 kg (221 lb)        BMI:   Estimated body mass index is 31 71 kg/m² as calculated from the following:    Height as of this encounter: 5' 10" (1 778 m)  Weight as of this encounter: 100 kg (221 lb)  PROCEDURES PERFORMED:  Large joint arthrocentesis: L knee  Universal Protocol:  Consent: Verbal consent obtained    Risks and benefits: risks, benefits and alternatives were discussed  Consent given by: patient  Patient understanding: patient states understanding of the procedure being performed  Site marked: the operative site was marked  Supporting Documentation  Indications: joint swelling   Procedure Details  Location: knee - L knee  Preparation: Patient was prepped and draped in the usual sterile fashion  Needle size: 18 G  Ultrasound guidance: no  Approach: lateral    Aspirate amount: 70 mL  Aspirate: clear and yellow    Patient tolerance: patient tolerated the procedure well with no immediate complications  Dressing:  Sterile dressing applied            Scribe Attestation    I,:  Bernadette Chin PA-C am acting as a scribe while in the presence of the attending physician :       I,:  Naveen Waters DO personally performed the services described in this documentation    as scribed in my presence :

## 2022-05-27 ENCOUNTER — TELEPHONE (OUTPATIENT)
Dept: OBGYN CLINIC | Facility: CLINIC | Age: 62
End: 2022-05-27

## 2022-05-27 NOTE — TELEPHONE ENCOUNTER
Dr Sidhu  RE: Increase knee pain  #: [phone]       Patient was seen on 5/23/22 to have his left knee drain   He states that the knee filled up again the day after  He states his pain is back and he is looking to see what he can to to help with the pain  He has tried to take tylenol and  Ibuprofen  600 mg  Patient would like a call back from clinical to discuss

## 2022-05-27 NOTE — TELEPHONE ENCOUNTER
Spoke to patient  He is scheduled for MRI on 5/31  He is asking for an appointment next week but there is no availability  Please advise when we can have him come in next week

## 2022-05-28 ENCOUNTER — APPOINTMENT (EMERGENCY)
Dept: RADIOLOGY | Facility: HOSPITAL | Age: 62
End: 2022-05-28
Payer: MEDICARE

## 2022-05-28 ENCOUNTER — HOSPITAL ENCOUNTER (EMERGENCY)
Facility: HOSPITAL | Age: 62
Discharge: HOME | End: 2022-05-28
Attending: EMERGENCY MEDICINE
Payer: MEDICARE

## 2022-05-28 VITALS
OXYGEN SATURATION: 97 % | WEIGHT: 221 LBS | TEMPERATURE: 98.9 F | SYSTOLIC BLOOD PRESSURE: 157 MMHG | DIASTOLIC BLOOD PRESSURE: 84 MMHG | HEART RATE: 88 BPM | BODY MASS INDEX: 31.64 KG/M2 | RESPIRATION RATE: 18 BRPM | HEIGHT: 70 IN

## 2022-05-28 DIAGNOSIS — M25.462 EFFUSION OF LEFT KNEE: Primary | ICD-10-CM

## 2022-05-28 DIAGNOSIS — M25.562 KNEE PAIN, LEFT: ICD-10-CM

## 2022-05-28 LAB
APPEARANCE FLD: ABNORMAL
COLOR FLD: ABNORMAL
LYMPHOCYTES # SNV MANUAL: 2 %
MONOCYTES NFR SNV MANUAL: 2 %
NEUTROPHILS NFR SNV MANUAL: 90 %
NEUTS BAND NFR SNV: 6 %
RBC # SNV MANUAL: 8 /UL (ref 0–10)
SITE: ABNORMAL
TOTAL CELLS COUNTED SPEC: 100
WBC # FLD MANUAL: ABNORMAL /UL (ref 0–200)

## 2022-05-28 PROCEDURE — 73564 X-RAY EXAM KNEE 4 OR MORE: CPT

## 2022-05-28 PROCEDURE — 89050 BODY FLUID CELL COUNT: CPT | Performed by: PHYSICIAN ASSISTANT

## 2022-05-28 PROCEDURE — 89051 BODY FLUID CELL COUNT: CPT | Performed by: PHYSICIAN ASSISTANT

## 2022-05-28 PROCEDURE — 99283 EMERGENCY DEPT VISIT LOW MDM: CPT

## 2022-05-28 PROCEDURE — 87070 CULTURE OTHR SPECIMN AEROBIC: CPT | Performed by: PHYSICIAN ASSISTANT

## 2022-05-28 PROCEDURE — 89060 EXAM SYNOVIAL FLUID CRYSTALS: CPT | Performed by: PHYSICIAN ASSISTANT

## 2022-05-28 PROCEDURE — 87205 SMEAR GRAM STAIN: CPT | Performed by: PHYSICIAN ASSISTANT

## 2022-05-28 PROCEDURE — 99285 EMERGENCY DEPT VISIT HI MDM: CPT | Performed by: EMERGENCY MEDICINE

## 2022-05-28 RX ORDER — LIDOCAINE HYDROCHLORIDE 10 MG/ML
5 INJECTION, SOLUTION EPIDURAL; INFILTRATION; INTRACAUDAL; PERINEURAL ONCE
Status: COMPLETED | OUTPATIENT
Start: 2022-05-28 | End: 2022-05-28

## 2022-05-28 RX ORDER — HYDROCODONE BITARTRATE AND ACETAMINOPHEN 5; 325 MG/1; MG/1
1 TABLET ORAL ONCE
Status: COMPLETED | OUTPATIENT
Start: 2022-05-28 | End: 2022-05-28

## 2022-05-28 RX ADMIN — LIDOCAINE HYDROCHLORIDE 5 ML: 10 INJECTION, SOLUTION EPIDURAL; INFILTRATION; INTRACAUDAL; PERINEURAL at 16:45

## 2022-05-28 RX ADMIN — HYDROCODONE BITARTRATE AND ACETAMINOPHEN 1 TABLET: 5; 325 TABLET ORAL at 16:48

## 2022-05-28 NOTE — ED PROVIDER NOTES
History  Chief Complaint   Patient presents with    Knee Pain     Sees ortho  Left knee swelling for months  Had drained before  81-year-old male with history of recurrent left knee joint effusions from a suspected meniscal injury presents to the emergency department with left knee pain and swelling  He frequently gets arthrocentesis done by orthopedics as an outpatient for the past month for this recurrent effusion  He states that the day after he had his last arthrocentesis last week the joint swelled up again  He has been taking ibuprofen for the pain with little to no relief  The past few days he has not been taking the ibuprofen because it made his stomach upset  Patient reports no injury or trauma to the left knee within the past week  Patient has a scheduled MRI for 05/31/2022 with orthopedics to evaluate for potential ligamentous injury  Denies fever, chills, nausea, vomiting, chest pain, SOB, rash, erythema, hip pain, ankle pain  History provided by:  Patient   used: No    Knee Pain  Location:  Knee  Injury: no    Knee location:  L knee  Pain details:     Quality:  Sharp and pressure    Radiates to:  Does not radiate    Severity:  Moderate    Onset quality:  Gradual    Duration:  1 week    Timing:  Constant    Progression:  Worsening  Chronicity:  Recurrent  Dislocation: no    Foreign body present:  No foreign bodies  Prior injury to area:  Unable to specify  Relieved by:  Nothing  Worsened by:  Flexion  Ineffective treatments:  Acetaminophen, NSAIDs and heat  Associated symptoms: swelling    Associated symptoms: no back pain and no fever    Swelling:     Location:  Leg (left knee)    Onset quality:  Gradual    Duration:  1 week      Prior to Admission Medications   Prescriptions Last Dose Informant Patient Reported? Taking?    Lancets (freestyle) lancets   No No   Sig: Use as instructed   Patient not taking: No sig reported   Microlet Lancets MISC   No No   Sig: Use 2 (two) times a day   atorvastatin (LIPITOR) 10 mg tablet   No No   Sig: Take 1 tablet (10 mg total) by mouth daily at bedtime   buPROPion (Wellbutrin SR) 100 mg 12 hr tablet   No No   Sig: Take 1 tablet (100 mg total) by mouth 2 (two) times a day   colchicine (COLCRYS) 0 6 mg tablet   No No   Si 2mg PO x 1 then 0 6mg PO 1 hour later x 1  Total of 3 pills per attack  May not repeat for 3 days   Patient not taking: No sig reported   glucose blood test strip   No No   Sig: Use as instructed   Patient not taking: No sig reported   glucose monitoring kit (FREESTYLE) monitoring kit   No No   Sig: Use 1 each 2 (two) times a day before meals   Patient not taking: No sig reported   lisinopril (ZESTRIL) 20 mg tablet   No No   Sig: Take 1 tablet (20 mg total) by mouth daily   naproxen (Naprosyn) 500 mg tablet   No No   Sig: Take 1 tablet (500 mg total) by mouth 2 (two) times a day with meals   ondansetron (ZOFRAN) 4 mg tablet   No No   Sig: Take 1 tablet (4 mg total) by mouth every 8 (eight) hours as needed for nausea or vomiting      Facility-Administered Medications: None       Past Medical History:   Diagnosis Date    Gout     Osteoarthritis        History reviewed  No pertinent surgical history  Family History   Problem Relation Age of Onset    Arthritis Mother     Coronary artery disease Father      I have reviewed and agree with the history as documented      E-Cigarette/Vaping    E-Cigarette Use Never User      E-Cigarette/Vaping Substances    Nicotine No     THC No     CBD No     Flavoring No     Other No     Unknown No      Social History     Tobacco Use    Smoking status: Current Every Day Smoker     Packs/day: 3 00     Years: 15 00     Pack years: 45 00     Types: Cigarettes    Smokeless tobacco: Never Used    Tobacco comment: not as much   Vaping Use    Vaping Use: Never used   Substance Use Topics    Alcohol use: Not Currently     Comment: occasionally    Drug use: No       Review of Systems Constitutional: Negative for chills and fever  HENT: Negative for ear pain and sore throat  Eyes: Negative for pain and visual disturbance  Respiratory: Negative for cough and shortness of breath  Cardiovascular: Negative for chest pain and palpitations  Gastrointestinal: Negative for abdominal pain and vomiting  Genitourinary: Negative for dysuria and hematuria  Musculoskeletal: Positive for joint swelling (left knee)  Negative for arthralgias and back pain  Skin: Negative for color change and rash  Neurological: Negative for seizures and syncope  Psychiatric/Behavioral: Negative for confusion  All other systems reviewed and are negative  Physical Exam  Physical Exam  Vitals and nursing note reviewed  Constitutional:       General: He is not in acute distress  Appearance: Normal appearance  He is well-developed and normal weight  HENT:      Head: Normocephalic and atraumatic  Right Ear: External ear normal       Left Ear: External ear normal       Nose: Nose normal       Mouth/Throat:      Mouth: Mucous membranes are moist       Pharynx: Oropharynx is clear  Eyes:      General: No scleral icterus  Right eye: No discharge  Left eye: No discharge  Conjunctiva/sclera: Conjunctivae normal    Cardiovascular:      Rate and Rhythm: Normal rate and regular rhythm  Pulses: Normal pulses  Heart sounds: No murmur heard  Pulmonary:      Effort: Pulmonary effort is normal  No respiratory distress  Breath sounds: Normal breath sounds  Abdominal:      Palpations: Abdomen is soft  Tenderness: There is no abdominal tenderness  Musculoskeletal:         General: Swelling and tenderness present  Cervical back: Neck supple  Right knee: Normal  No effusion  Left knee: Effusion present  No erythema or ecchymosis  Right lower leg: No edema  Left lower leg: No edema  Legs:    Skin:     General: Skin is warm and dry  Neurological:      General: No focal deficit present  Mental Status: He is alert and oriented to person, place, and time  Mental status is at baseline  Sensory: No sensory deficit  Motor: No weakness  Psychiatric:         Mood and Affect: Mood normal          Behavior: Behavior normal          Thought Content: Thought content normal          Vital Signs  ED Triage Vitals [05/28/22 1532]   Temperature Pulse Respirations Blood Pressure SpO2   98 7 °F (37 1 °C) 103 18 121/75 97 %      Temp Source Heart Rate Source Patient Position - Orthostatic VS BP Location FiO2 (%)   Temporal Monitor Sitting Right arm --      Pain Score       8           Vitals:    05/28/22 1532 05/28/22 1723   BP: 121/75 157/84   Pulse: 103 88   Patient Position - Orthostatic VS: Sitting Lying         Visual Acuity      ED Medications  Medications   lidocaine (PF) (XYLOCAINE-MPF) 1 % injection 5 mL (5 mL Infiltration Given 5/28/22 1645)   HYDROcodone-acetaminophen (NORCO) 5-325 mg per tablet 1 tablet (1 tablet Oral Given 5/28/22 1648)       Diagnostic Studies  Results Reviewed     Procedure Component Value Units Date/Time    Synovial fluid, RBC count [[id number]]  (Normal) Collected: 05/28/22 1713    Lab Status: Final result Specimen: Synovial Fluid from Joint, Left Knee Updated: 05/28/22 1741     RBC, SYNOVIAL 8    Synovial fluid, Culture and Gram stain [[id number]] Collected: 05/28/22 1713    Lab Status: In process Specimen: Body Fluid from Joint, Left Knee Updated: 05/28/22 1723    Synovial fluid, white cell count w/ diff [[id number]] Collected: 05/28/22 1713    Lab Status: In process Specimen: Synovial Fluid from Joint, Left Knee Updated: 05/28/22 1723    Synovial fluid, crystal [[id number]] Collected: 05/28/22 1713    Lab Status:  In process Specimen: Synovial Fluid from Joint, Left Knee Updated: 05/28/22 1723                 XR knee 4+ views left injury   ED Interpretation by Eder Chneg PA-C (05/28 1717)   No acute osseous abnormality on my interpretation  Procedures  Procedures         ED Course                                             MDM  Number of Diagnoses or Management Options  Effusion of left knee: new and requires workup  Knee pain, left: new and requires workup  Diagnosis management comments: 64 yo M with a history of recurrent knee effusions in the past month presents to the emergency department with one week of swelling in his left knee  Vitals are stable  Exam reveals a large effusion of the left knee joint that is limiting range of motion  Neurovascularly intact above and below the left knee joint  DDx: ligamentous injury vs gout vs pseudogout  XR of the left knee ordered to rule out fracture vs dislocation  Patient scheduled for an MRI on 05/31/2022 of the left knee to evaluate for possible meniscal injury  I advised him to continue with the MRI and follow-up with Dr Gerhard Reece as an outpatient  No fracture dislocation seen on x-ray  Cy Knight PA-C performed arthrocentesis and aspirated approximately 75cc of yellow, turbid fluid  Patient reports significant relief  Body fluid analysis was ordered of the aspirated fluid  Low clinical suspicion for septic joint  No further workup needed at this time  Plan for outpatient f/u with orthopedics in one week  Strict return precautions discussed  I advised him to continue taking tylenol for pain relief  Informed him that we will call him with any positive test results from his body fluid analysis  Patient verbalizes understanding of assessment and plan and is amenable to discharge  Patient was discharged in stable condition          Amount and/or Complexity of Data Reviewed  Clinical lab tests: ordered  Tests in the radiology section of CPT®: ordered and reviewed  Review and summarize past medical records: yes  Discuss the patient with other providers: yes  Independent visualization of images, tracings, or specimens: yes    Patient Progress  Patient progress: improved      Disposition  Final diagnoses:   Effusion of left knee   Knee pain, left     Time reflects when diagnosis was documented in both MDM as applicable and the Disposition within this note     Time User Action Codes Description Comment    5/28/2022  5:11 PM Teagan Johns Add [M25 462] Effusion of left knee     5/28/2022  5:13 PM Nadeem Snyder Add [L70 666] Knee pain, left       ED Disposition     ED Disposition   Discharge    Condition   Stable    Date/Time   Sat May 28, 2022  5:10 PM    Logan Hanson 151 discharge to home/self care  Follow-up Information     Follow up With Specialties Details Why Contact Info Additional Information    Margret Queen, [address], Nurse Practitioner, Emergency Medicine Call in 3 days follow up for further evaluation of symptoms [address] [facility]  [address] [facility] Emergency Medicine Go to  If symptoms worsen [address] [phone]  [facility] Emergency Department, [address] [facility], [address] [facility] Schedule an appointment as soon as possible for a visit in 1 week follow up for further evaluation of symptoms [address]  [address] [facility] [address]             Discharge Medication List as of 5/28/2022  5:31 PM      CONTINUE these medications which have NOT CHANGED    Details   atorvastatin (LIPITOR) 10 mg tablet Take 1 tablet (10 mg total) by mouth daily at bedtime, Starting Mon 2/21/2022, Normal      buPROPion (Wellbutrin SR) 100 mg 12 hr tablet Take 1 tablet (100 mg total) by mouth 2 (two) times a day, Starting Mon 3/14/2022, Normal      colchicine (COLCRYS) 0 6 mg tablet 1 2mg PO x 1 then 0 6mg PO 1 hour later x 1  Total of 3 pills per attack   May not repeat for 3 days, Normal      glucose blood test strip Use as instructed, Normal      glucose monitoring kit (FREESTYLE) monitoring kit Use 1 each 2 (two) times a day before meals, Starting Thu 7/8/2021, Normal      !! Lancets (freestyle) lancets Use as instructed, Normal      lisinopril (ZESTRIL) 20 mg tablet Take 1 tablet (20 mg total) by mouth daily, Starting Thu 2/24/2022, Normal      !! Microlet Lancets MISC Use 2 (two) times a day, Starting Thu 7/8/2021, Normal      naproxen (Naprosyn) 500 mg tablet Take 1 tablet (500 mg total) by mouth 2 (two) times a day with meals, Starting Tue 2/1/2022, Normal      ondansetron (ZOFRAN) 4 mg tablet Take 1 tablet (4 mg total) by mouth every 8 (eight) hours as needed for nausea or vomiting, Starting Wed 4/13/2022, Normal       !! - Potential duplicate medications found  Please discuss with provider  No discharge procedures on file      PDMP Review     None          ED Provider  Electronically Signed by           Anju Escamilla PA-C  05/28/22 1301 [facility], DO  05/28/22 0118

## 2022-05-28 NOTE — ED PROCEDURE NOTE
Procedure  Arthrocentesis    Date/Time: 5/28/2022 5:11 PM  Performed by: Lokesh Stone PA-C  Authorized by: Lokesh Stone PA-C     Location:  Bedside  Verbal consent obtained?: Yes    Risks and benefits: Risks, benefits and alternatives were discussed    Consent given by:  Patient  Required items: Required blood products, implants, devices and special equipment available    Patient identity confirmed:  Verbally with patient and arm band  Indications:  Joint swelling, pain and therapeutic  Body area:  Knee  Joint:  Left knee  Local anesthesia used?: Yes    Anesthesia:  Local infiltration  Local anesthetic:  Lidocaine 1% without epinephrine  Anesthetic total (ml):  3  Preparation: Patient was prepped and draped in usual sterile fashion    Needle size:  18 G  Ultrasound guidance: No    Approach:  Lateral  Aspirate amount (ml):  75  Aspirate:  Blood-tinged and yellow  Patient tolerance:  Patient tolerated the procedure well with no immediate complications                     Lokesh Stone PA-C  05/28/22 6700  [address] [facility],   05/28/22 2049

## 2022-05-28 NOTE — ED ATTENDING ATTESTATION
5/28/2022  MADISON, [address] [place] [address] DO, saw and evaluated the patient  I have discussed the patient with the resident/non-physician practitioner and agree with the resident's/non-physician practitioner's findings, Plan of Care, and MDM as documented in the resident's/non-physician practitioner's note, except where noted  All available labs and Radiology studies were reviewed  I was present for key portions of any procedure(s) performed by the resident/non-physician practitioner and I was immediately available to provide assistance  At this point I agree with the current assessment done in the Emergency Department  I have conducted an independent evaluation of this patient a history and physical is as follows:    ED Course         Critical Care Time  Procedures    69-year-old gentleman presents emergency department with a history of chronic we occurring knee effusions, patient was seen evaluated recently by Dr Julian Thurston on the 23rd May 2022 where he underwent a large joint arthrocentesis were 70 cc of clear synovial fluid was drained, see physician assistant note for specifics but the discussed the case with Dr Julian Thurston advised we could proceed with a therapeutic arthrocentesis, no fever, pain not out of proportion to presentation, due for out MRI to determine degree of a possible meniscus tear of the left knee  Patient stable for discharge, assessed post procedure performed by physician assistant  Portions of the record may have been created with voice recognition software  Occasional wrong word or "sound a like" substitutions may have occurred due to the inherent limitations of voice recognition software  Read the chart carefully and recognize, using context, where substitutions have occurred      Counseling: I had a detailed discussion with the patient and/or guardian regarding: the historical points, exam findings, and any diagnostic results supporting the discharge diagnosis, lab results, radiology results, discharge instructions reviewed with patient and/or family/caregiver and understanding was verbalized  Instructions given to return to the emergency department if symptoms worsen or persist, or if there are any questions or concerns that arise at home

## 2022-05-28 NOTE — DISCHARGE INSTRUCTIONS
Please follow-up with orthopedics in 1 week for re-evaluation of her symptoms  The results of your body fluid analysis will be available through [facility]  We will give you a call if anything comes back positive  Return to the emergency department if you develop any significant changes or worsening condition

## 2022-05-29 LAB — CRYSTALS SNV QL MICRO: NORMAL

## 2022-05-31 ENCOUNTER — HOSPITAL ENCOUNTER (EMERGENCY)
Facility: HOSPITAL | Age: 62
Discharge: HOME | End: 2022-05-31
Attending: FAMILY MEDICINE | Admitting: FAMILY MEDICINE
Payer: MEDICARE

## 2022-05-31 ENCOUNTER — HOSPITAL ENCOUNTER (OUTPATIENT)
Dept: RADIOLOGY | Facility: IMAGING CENTER | Age: 62
Discharge: HOME | End: 2022-05-31
Payer: MEDICARE

## 2022-05-31 ENCOUNTER — TELEPHONE (OUTPATIENT)
Dept: OBGYN CLINIC | Facility: CLINIC | Age: 62
End: 2022-05-31

## 2022-05-31 VITALS
SYSTOLIC BLOOD PRESSURE: 161 MMHG | TEMPERATURE: 98 F | HEIGHT: 70 IN | RESPIRATION RATE: 16 BRPM | WEIGHT: 220 LBS | DIASTOLIC BLOOD PRESSURE: 92 MMHG | BODY MASS INDEX: 31.5 KG/M2 | HEART RATE: 92 BPM | OXYGEN SATURATION: 98 %

## 2022-05-31 DIAGNOSIS — M25.562 LEFT KNEE PAIN: Primary | ICD-10-CM

## 2022-05-31 DIAGNOSIS — M79.5 FOREIGN BODY (FB) IN SOFT TISSUE: ICD-10-CM

## 2022-05-31 DIAGNOSIS — M25.462 EFFUSION OF LEFT KNEE: ICD-10-CM

## 2022-05-31 DIAGNOSIS — S83.204D OTHER TEAR OF MENISCUS OF LEFT KNEE, UNSPECIFIED MENISCUS, UNSPECIFIED WHETHER OLD OR CURRENT TEAR, SUBSEQUENT ENCOUNTER: ICD-10-CM

## 2022-05-31 PROCEDURE — G1004 CDSM NDSC: HCPCS

## 2022-05-31 PROCEDURE — 99284 EMERGENCY DEPT VISIT MOD MDM: CPT | Performed by: PHYSICIAN ASSISTANT

## 2022-05-31 PROCEDURE — 73721 MRI JNT OF LWR EXTRE W/O DYE: CPT

## 2022-05-31 PROCEDURE — 99283 EMERGENCY DEPT VISIT LOW MDM: CPT

## 2022-05-31 PROCEDURE — 96372 THER/PROPH/DIAG INJ SC/IM: CPT

## 2022-05-31 RX ORDER — ACETAMINOPHEN 325 MG/1
975 TABLET ORAL ONCE
Status: COMPLETED | OUTPATIENT
Start: 2022-05-31 | End: 2022-05-31

## 2022-05-31 RX ORDER — MELOXICAM 15 MG/1
15 TABLET ORAL DAILY
Qty: 7 TABLET | Refills: 0 | Status: SHIPPED | OUTPATIENT
Start: 2022-05-31 | End: 2022-06-07

## 2022-05-31 RX ORDER — ACETAMINOPHEN 500 MG
1000 TABLET ORAL 3 TIMES DAILY
Qty: 50 TABLET | Refills: 0 | Status: SHIPPED | OUTPATIENT
Start: 2022-05-31

## 2022-05-31 RX ORDER — KETOROLAC TROMETHAMINE 30 MG/ML
30 INJECTION, SOLUTION INTRAMUSCULAR; INTRAVENOUS ONCE
Status: COMPLETED | OUTPATIENT
Start: 2022-05-31 | End: 2022-05-31

## 2022-05-31 RX ADMIN — KETOROLAC TROMETHAMINE 30 MG: 30 INJECTION, SOLUTION INTRAMUSCULAR at 14:02

## 2022-05-31 RX ADMIN — ACETAMINOPHEN 975 MG: 325 TABLET ORAL at 14:02

## 2022-05-31 NOTE — TELEPHONE ENCOUNTER
Left message for patient to see if he would like to come in this afternoon for an appointment with Dr Taylor Lora in the [facility] office  Call back number was given

## 2022-05-31 NOTE — DISCHARGE INSTRUCTIONS
Start taking Mobic  Take 1000 mg of Tylenol 3 times daily while on Mobic  Do not take other anti-inflammatories such as ibuprofen and naproxen while on Mobic  Call your orthopedist   Return to the ER with any fevers

## 2022-05-31 NOTE — ED PROVIDER NOTES
History  Chief Complaint   Patient presents with    Knee Pain     Pain and swelling in left knee  81-year-old male history of gout and osteoarthritis presents complaining of left knee pain  Patient notes that he has followed up by Dr Sidhu of orthopedics at ordered an MRI which she had performed today  Patient reports continued pain and swelling of the left knee  Reports significant improvement of his symptoms when he had the knee drained in the office  Patient states he has been unable to get into the office until next week  He denies any fevers, chills, recent trauma  He is able to range the joint although does report some pain  States he has been taking Tylenol with only mild relief of his symptoms  States he was prescribed ibuprofen 600 mg tablets but does not take them due to making his head feel numb  Patient denies any other complaints or concerns at this time  Per chart review patient had a synovial fluid analysis done 3 days ago that did not demonstrate a clinically significant white blood cell count  Prior to Admission Medications   Prescriptions Last Dose Informant Patient Reported? Taking? Lancets (freestyle) lancets   No No   Sig: Use as instructed   Patient not taking: No sig reported   Microlet Lancets MISC   No No   Sig: Use 2 (two) times a day   atorvastatin (LIPITOR) 10 mg tablet   No No   Sig: Take 1 tablet (10 mg total) by mouth daily at bedtime   buPROPion (Wellbutrin SR) 100 mg 12 hr tablet   No No   Sig: Take 1 tablet (100 mg total) by mouth 2 (two) times a day   colchicine (COLCRYS) 0 6 mg tablet   No No   Si 2mg PO x 1 then 0 6mg PO 1 hour later x 1  Total of 3 pills per attack   May not repeat for 3 days   Patient not taking: No sig reported   glucose blood test strip   No No   Sig: Use as instructed   Patient not taking: No sig reported   glucose monitoring kit (FREESTYLE) monitoring kit   No No   Sig: Use 1 each 2 (two) times a day before meals   Patient not taking: No sig reported   lisinopril (ZESTRIL) 20 mg tablet   No No   Sig: Take 1 tablet (20 mg total) by mouth daily   naproxen (Naprosyn) 500 mg tablet   No No   Sig: Take 1 tablet (500 mg total) by mouth 2 (two) times a day with meals   ondansetron (ZOFRAN) 4 mg tablet   No No   Sig: Take 1 tablet (4 mg total) by mouth every 8 (eight) hours as needed for nausea or vomiting      Facility-Administered Medications: None       Past Medical History:   Diagnosis Date    Gout     Osteoarthritis        History reviewed  No pertinent surgical history  Family History   Problem Relation Age of Onset    Arthritis Mother     Coronary artery disease Father      I have reviewed and agree with the history as documented  E-Cigarette/Vaping    E-Cigarette Use Never User      E-Cigarette/Vaping Substances    Nicotine No     THC No     CBD No     Flavoring No     Other No     Unknown No      Social History     Tobacco Use    Smoking status: Current Every Day Smoker     Packs/day: 3 00     Years: 15 00     Pack years: 45 00     Types: Cigarettes    Smokeless tobacco: Never Used    Tobacco comment: not as much   Vaping Use    Vaping Use: Never used   Substance Use Topics    Alcohol use: Not Currently     Comment: occasionally    Drug use: No       Review of Systems   Constitutional: Negative for chills, fatigue and fever  HENT: Negative for congestion and sore throat  Eyes: Negative for pain  Respiratory: Negative for cough, chest tightness, shortness of breath and wheezing  Cardiovascular: Negative for chest pain, palpitations and leg swelling  Gastrointestinal: Negative for abdominal pain, constipation, diarrhea, nausea and vomiting  Endocrine: Negative for polyuria  Genitourinary: Negative for dysuria  Musculoskeletal: Positive for arthralgias and joint swelling  Negative for back pain, myalgias and neck pain  Skin: Negative for rash     Neurological: Negative for dizziness, syncope, light-headedness and headaches  All other systems reviewed and are negative  Physical Exam  Physical Exam  Vitals reviewed  Constitutional:       Appearance: Normal appearance  He is well-developed  HENT:      Head: Normocephalic and atraumatic  Mouth/Throat:      Mouth: Mucous membranes are moist    Eyes:      Conjunctiva/sclera: Conjunctivae normal    Cardiovascular:      Rate and Rhythm: Normal rate and regular rhythm  Heart sounds: Normal heart sounds  Pulmonary:      Effort: Pulmonary effort is normal       Breath sounds: Normal breath sounds  Abdominal:      General: Bowel sounds are normal       Palpations: Abdomen is soft  Tenderness: There is no abdominal tenderness  Musculoskeletal:      Cervical back: Normal range of motion  Comments: Mild to moderate soft tissue swelling of left knee  No bony tenderness  Negative Lachman's  Knee stable to collateral stress  No overlying erythema or increased warmth  Able to passively range joint  Distal sensation intact  2+ DP pulse  Skin:     General: Skin is warm and dry  Capillary Refill: Capillary refill takes less than 2 seconds  Neurological:      General: No focal deficit present  Mental Status: He is alert and oriented to person, place, and time     Psychiatric:         Mood and Affect: Mood normal          Behavior: Behavior normal          Vital Signs  ED Triage Vitals [05/31/22 1253]   Temperature Pulse Respirations Blood Pressure SpO2   98 °F (36 7 °C) 92 16 161/92 98 %      Temp Source Heart Rate Source Patient Position - Orthostatic VS BP Location FiO2 (%)   Tympanic Monitor Sitting Right arm --      Pain Score       10 - Worst Possible Pain           Vitals:    05/31/22 1253   BP: 161/92   Pulse: 92   Patient Position - Orthostatic VS: Sitting         Visual Acuity      ED Medications  Medications   ketorolac (TORADOL) injection 30 mg (30 mg Intramuscular Given 5/31/22 1402)   acetaminophen (TYLENOL) tablet 975 mg (975 mg Oral Given 5/31/22 1402)       Diagnostic Studies  Results Reviewed     None                 No orders to display              Procedures  Procedures         ED Course                               SBIRT 22yo+    Flowsheet Row Most Recent Value   SBIRT (25 yo +)    In order to provide better care to our patients, we are screening all of our patients for alcohol and drug use  Would it be okay to ask you these screening questions? No Filed at: 05/31/2022 1316                    [facility]  Number of Diagnoses or Management Options  Left knee pain  Diagnosis management comments: Patient presented with concerns of left knee swelling which is a chronic issue for the patient for the past 2 months  Doubt septic arthritis  Patient had synovial fluid analysis that was negative three days ago  Patient has outpatient follow-up scheduled with Orthopedics  Patient presented with concerns that he cannot wait that long  States he did not tolerate ibuprofen well  Will try Mobic  Pain well controlled in the ED  Patient ambulated with walker without significant difficulty  Return precautions advised, all questions were answered patient was agreeable to plan  Disposition  Final diagnoses:   Left knee pain     Time reflects when diagnosis was documented in both MDM as applicable and the Disposition within this note     Time User Action Codes Description Comment    5/31/2022  1:40 PM Wesley Dai Add [F36 430] Left knee pain       ED Disposition     ED Disposition   Discharge    Condition   Stable    Date/Time   Tue May 31, 2022  1:40 PM    Comment   [address] discharge to home/self care                 Follow-up Information     Follow up With Specialties Details Why Contact Info Additional [address] [facility] Schedule an appointment as soon as possible for a visit   [address] [facility] [address] [facility] [address] [phone] [facility], [address], [address], [facility], [place], [address] [phone]          Discharge Medication List as of 5/31/2022  2:12 PM      START taking these medications    Details   acetaminophen (TYLENOL) 500 mg tablet Take 2 tablets (1,000 mg total) by mouth 3 (three) times a day, Starting Tue 5/31/2022, Normal      meloxicam (Mobic) 15 mg tablet Take 1 tablet (15 mg total) by mouth daily for 7 days, Starting Tue 5/31/2022, Until Tue 6/7/2022, Normal         CONTINUE these medications which have NOT CHANGED    Details   atorvastatin (LIPITOR) 10 mg tablet Take 1 tablet (10 mg total) by mouth daily at bedtime, Starting Mon 2/21/2022, Normal      buPROPion (Wellbutrin SR) 100 mg 12 hr tablet Take 1 tablet (100 mg total) by mouth 2 (two) times a day, Starting Mon 3/14/2022, Normal      colchicine (COLCRYS) 0 6 mg tablet 1 2mg PO x 1 then 0 6mg PO 1 hour later x 1  Total of 3 pills per attack  May not repeat for 3 days, Normal      glucose blood test strip Use as instructed, Normal      glucose monitoring kit (FREESTYLE) monitoring kit Use 1 each 2 (two) times a day before meals, Starting Thu 7/8/2021, Normal      !! Lancets (freestyle) lancets Use as instructed, Normal      lisinopril (ZESTRIL) 20 mg tablet Take 1 tablet (20 mg total) by mouth daily, Starting Thu 2/24/2022, Normal      !! Microlet Lancets MISC Use 2 (two) times a day, Starting Thu 7/8/2021, Normal      naproxen (Naprosyn) 500 mg tablet Take 1 tablet (500 mg total) by mouth 2 (two) times a day with meals, Starting Tue 2/1/2022, Normal      ondansetron (ZOFRAN) 4 mg tablet Take 1 tablet (4 mg total) by mouth every 8 (eight) hours as needed for nausea or vomiting, Starting Wed 4/13/2022, Normal       !! - Potential duplicate medications found  Please discuss with provider  No discharge procedures on file      PDMP Review     None          ED Provider  Electronically Signed by           Prince peraza Svitlana Almaraz PA-C  05/31/22 160

## 2022-06-01 LAB
BACTERIA SPEC BFLD CULT: NO GROWTH
GRAM STN SPEC: NORMAL
GRAM STN SPEC: NORMAL

## 2022-06-02 ENCOUNTER — TELEPHONE (OUTPATIENT)
Dept: OBGYN CLINIC | Facility: OTHER | Age: 62
End: 2022-06-02

## 2022-06-02 ENCOUNTER — TELEPHONE (OUTPATIENT)
Dept: FAMILY MEDICINE CLINIC | Facility: CLINIC | Age: 62
End: 2022-06-02

## 2022-06-02 NOTE — TELEPHONE ENCOUNTER
Rosalina Campbell from [facility] primary [facility] in calling regarding the same thing as note below   Pt needs knee drained    #[phone]

## 2022-06-02 NOTE — TELEPHONE ENCOUNTER
Hossein Cisneros called and stated that he went to the Er and they will not drain his knee    Called Dr Rob Hare and he was told that he doesn't have time to do it until June 14    He stated that he cannot take the pain and please can you help or send him to another Orthpedic    Please advise    Phone: [phone]

## 2022-06-02 NOTE — TELEPHONE ENCOUNTER
Patient called back into the office returning call about coming in sooner  No availabilities , has f/u on 6/14/22  He is requesting a call back from clinical due to pain  Please call patient back: [phone] (he has been having phone issues but now its fixed)         advised patient there have been 2 calls to bring him in sooner for issue

## 2022-06-03 ENCOUNTER — PREP FOR PROCEDURE (OUTPATIENT)
Dept: OBGYN CLINIC | Facility: CLINIC | Age: 62
End: 2022-06-03

## 2022-06-03 ENCOUNTER — HOSPITAL ENCOUNTER (OUTPATIENT)
Facility: HOSPITAL | Age: 62
Setting detail: OUTPATIENT SURGERY
End: 2022-06-03
Attending: ORTHOPAEDIC SURGERY | Admitting: ORTHOPAEDIC SURGERY
Payer: MEDICARE

## 2022-06-03 ENCOUNTER — OFFICE VISIT (OUTPATIENT)
Dept: OBGYN CLINIC | Facility: CLINIC | Age: 62
End: 2022-06-03
Payer: MEDICARE

## 2022-06-03 VITALS
HEART RATE: 74 BPM | SYSTOLIC BLOOD PRESSURE: 155 MMHG | HEIGHT: 70 IN | DIASTOLIC BLOOD PRESSURE: 92 MMHG | BODY MASS INDEX: 31.57 KG/M2

## 2022-06-03 DIAGNOSIS — M25.462 EFFUSION OF LEFT KNEE: ICD-10-CM

## 2022-06-03 DIAGNOSIS — M17.12 PRIMARY OSTEOARTHRITIS OF LEFT KNEE: Primary | ICD-10-CM

## 2022-06-03 DIAGNOSIS — S83.242D OTHER TEAR OF MEDIAL MENISCUS OF LEFT KNEE AS CURRENT INJURY, SUBSEQUENT ENCOUNTER: ICD-10-CM

## 2022-06-03 DIAGNOSIS — Z01.812 PRE-OPERATIVE LABORATORY EXAMINATION: ICD-10-CM

## 2022-06-03 PROCEDURE — 87075 CULTR BACTERIA EXCEPT BLOOD: CPT | Performed by: PHYSICIAN ASSISTANT

## 2022-06-03 PROCEDURE — 20610 DRAIN/INJ JOINT/BURSA W/O US: CPT | Performed by: ORTHOPAEDIC SURGERY

## 2022-06-03 PROCEDURE — 87205 SMEAR GRAM STAIN: CPT | Performed by: PHYSICIAN ASSISTANT

## 2022-06-03 PROCEDURE — 87070 CULTURE OTHR SPECIMN AEROBIC: CPT | Performed by: PHYSICIAN ASSISTANT

## 2022-06-03 PROCEDURE — 99213 OFFICE O/P EST LOW 20 MIN: CPT | Performed by: ORTHOPAEDIC SURGERY

## 2022-06-03 RX ORDER — CHLORHEXIDINE GLUCONATE 0.12 MG/ML
15 RINSE ORAL ONCE
Status: CANCELLED | OUTPATIENT
Start: 2022-06-03 | End: 2022-06-03

## 2022-06-03 RX ORDER — CHLORHEXIDINE GLUCONATE 4 G/100ML
SOLUTION TOPICAL DAILY PRN
Status: CANCELLED | OUTPATIENT
Start: 2022-06-03

## 2022-06-03 NOTE — TELEPHONE ENCOUNTER
The patient can come in today before 10:00 a m  To have his knee aspirated  If he can come in today, I will be glad to see him on Tuesday

## 2022-06-03 NOTE — PROGRESS NOTES
Assessment:  1  Primary osteoarthritis of left knee  XR knee 3 vw left non injury   2  Other tear of medial meniscus of left knee as current injury, subsequent encounter     3  Effusion of left knee  Body fluid culture and Gram stain    Anaerobic culture and Gram stain       Plan:  Left medial meniscus tear  In the presence of lack of relief with non operative modalities and in conjunction with MRI findings and symptoms, quality of life decision to pursue a left knee arthroscopy with partial medial lateral meniscectomies  Patient wishes to pursue surgical intervention as he finds that his symptoms are impacting his daily activities as well as impeding on his quality of life  Risks and benefits of a knee arthroscopy were discussed  Consents obtained  Reasonable expectations of surgical outcomes advised given the amount of arthritis the patient does have  The patient was aspirated of 30ml fluid and fluid was sent for aerobic and anaerobic cultures  He should follow up for post-op visit  The patient has a tear of his medial meniscus of his left knee  He also has recurrent effusions and history of gout  At this point, treatment options were discussed  He has opted for elective diagnostic arthroscopy of his left knee and possible medial meniscectomy  All risks, complications, benefits were discussed with the patient in great detail including bleeding, infection, blood clots, pain, stiffness, neurovascular damage, fractures, dislocations, the possibility of loss of life from surgery, heart attack, stroke, etcetera  The knee was aspirated 35 cc of fluid which was sent for culture  His surgery scheduled for June 8, 2022  If his condition changes, he will not hesitate to let us know      To do next visit:  Return for post-op visit  The above stated was discussed in layman's terms and the patient expressed understanding  All questions were answered to the patient's satisfaction         Scribe Attestation I,:  Maria Fernanda Cleveland am acting as a scribe while in the presence of the attending physician :       I,:  Alem Grissom, DO personally performed the services described in this documentation    as scribed in my presence :             Subjective:   Guanaco Sanabria is a 65 y o  male who presents for follow up of left knee with MRI review  He is s/p aspiration with benefit, 5/23/2022  Today he complains of       Review of systems negative unless otherwise specified in HPI    Past Medical History:   Diagnosis Date    Gout     Osteoarthritis        History reviewed  No pertinent surgical history      Family History   Problem Relation Age of Onset    Arthritis Mother     Coronary artery disease Father        Social History     Occupational History    Not on file   Tobacco Use    Smoking status: Current Every Day Smoker     Packs/day: 3 00     Years: 15 00     Pack years: 45 00     Types: Cigarettes    Smokeless tobacco: Never Used    Tobacco comment: not as much   Vaping Use    Vaping Use: Never used   Substance and Sexual Activity    Alcohol use: Not Currently     Comment: occasionally    Drug use: No    Sexual activity: Not on file         Current Outpatient Medications:     acetaminophen (TYLENOL) 500 mg tablet, Take 2 tablets (1,000 mg total) by mouth 3 (three) times a day, Disp: 50 tablet, Rfl: 0    atorvastatin (LIPITOR) 10 mg tablet, Take 1 tablet (10 mg total) by mouth daily at bedtime, Disp: 90 tablet, Rfl: 3    buPROPion (Wellbutrin SR) 100 mg 12 hr tablet, Take 1 tablet (100 mg total) by mouth 2 (two) times a day, Disp: 60 tablet, Rfl: 1    lisinopril (ZESTRIL) 20 mg tablet, Take 1 tablet (20 mg total) by mouth daily, Disp: 90 tablet, Rfl: 1    meloxicam (Mobic) 15 mg tablet, Take 1 tablet (15 mg total) by mouth daily for 7 days, Disp: 7 tablet, Rfl: 0    Microlet Lancets MISC, Use 2 (two) times a day, Disp: 100 each, Rfl: 2    naproxen (Naprosyn) 500 mg tablet, Take 1 tablet (500 mg total) by mouth 2 (two) times a day with meals, Disp: 180 tablet, Rfl: 3    ondansetron (ZOFRAN) 4 mg tablet, Take 1 tablet (4 mg total) by mouth every 8 (eight) hours as needed for nausea or vomiting, Disp: 20 tablet, Rfl: 1    colchicine (COLCRYS) 0 6 mg tablet, 1 2mg PO x 1 then 0 6mg PO 1 hour later x 1  Total of 3 pills per attack  May not repeat for 3 days (Patient not taking: No sig reported), Disp: 3 tablet, Rfl: 2    glucose blood test strip, Use as instructed (Patient not taking: No sig reported), Disp: 100 each, Rfl: 3    glucose monitoring kit (FREESTYLE) monitoring kit, Use 1 each 2 (two) times a day before meals (Patient not taking: No sig reported), Disp: 1 each, Rfl: 0    Lancets (freestyle) lancets, Use as instructed (Patient not taking: No sig reported), Disp: 100 each, Rfl: 3    No Known Allergies         Vitals:    06/03/22 0938   BP: 155/92   Pulse: 74       Objective:  Physical exam  · General: Awake, Alert, Oriented  · Eyes: Pupils equal, round and reactive to light  · Heart: regular rate and rhythm  · Lungs: No audible wheezing  · Abdomen: soft                    Ortho Exam  Left knee:  No erythema or ecchymosis  No effusion or swelling  Normal strength  Good ROM   Calf compartments soft and supple  Sensation intact  Toes are warm sensate and mobile        Diagnostics, reviewed and taken today if performed as documented: The attending physician has personally reviewed the pertinent films in PACS and interpretation is as follows:  Left knee MRI:  Complex tear of anterior horn, body and posterior horn of medial meniscus  Advanced arthritc changes of patellofemoral compartment  Moderate patellar tendinosis  Moderate effusion  Left knee x-ray:  Mild arthritic changes  Procedures, if performed today:    Large joint arthrocentesis: L knee  Universal Protocol:  Consent: Verbal consent obtained    Risks and benefits: risks, benefits and alternatives were discussed  Consent given by: patient  Time out: Immediately prior to procedure a "time out" was called to verify the correct patient, procedure, equipment, support staff and site/side marked as required  Patient understanding: patient states understanding of the procedure being performed  Patient identity confirmed: verbally with patient    Supporting Documentation  Indications: pain   Procedure Details  Location: knee - L knee  Preparation: Patient was prepped and draped in the usual sterile fashion  Needle size: 22 G  Ultrasound guidance: no  Approach: anterolateral    Aspirate amount: 30 mL  Aspirate: yellow and cloudy    Patient tolerance: patient tolerated the procedure well with no immediate complications  Dressing:  Sterile dressing applied              Portions of the record may have been created with voice recognition software  Occasional wrong word or "sound a like" substitutions may have occurred due to the inherent limitations of voice recognition software  Read the chart carefully and recognize, using context, where substitutions have occurred

## 2022-06-03 NOTE — TELEPHONE ENCOUNTER
Called and spoke to patient and he will be at the [facility] office before 10 am only spot open was the 11 am so put him in that slot

## 2022-06-06 ENCOUNTER — TELEPHONE (OUTPATIENT)
Dept: OBGYN CLINIC | Facility: HOSPITAL | Age: 62
End: 2022-06-06

## 2022-06-06 LAB
BACTERIA SPEC ANAEROBE CULT: NO GROWTH
BACTERIA SPEC BFLD CULT: NO GROWTH
GRAM STN SPEC: NORMAL

## 2022-06-06 NOTE — TELEPHONE ENCOUNTER
S/w pt and told him I will speak with Dr Shubham Mantilla about his questions tomorrow  Pt verbalized understanding

## 2022-06-07 NOTE — TELEPHONE ENCOUNTER
S/w pt  He wanted to cancel his sx because he can't be out of work that long  I explained to him that Dr Radha Campbell would not be able to say the amount of time he'd need to be out of work until he looked @ his knee in sx  Pt understood, but wants to put sx on hold  He said he thinks his knee is healing itself  I told pt to call us if he needs to make an appt

## 2022-09-08 ENCOUNTER — APPOINTMENT (OUTPATIENT)
Dept: LAB | Facility: CLINIC | Age: 62
End: 2022-09-08
Payer: MEDICARE

## 2022-09-08 ENCOUNTER — OFFICE VISIT (OUTPATIENT)
Dept: FAMILY MEDICINE CLINIC | Facility: CLINIC | Age: 62
End: 2022-09-08
Payer: MEDICARE

## 2022-09-08 VITALS
HEART RATE: 98 BPM | OXYGEN SATURATION: 99 % | RESPIRATION RATE: 20 BRPM | DIASTOLIC BLOOD PRESSURE: 88 MMHG | WEIGHT: 221 LBS | BODY MASS INDEX: 31.64 KG/M2 | SYSTOLIC BLOOD PRESSURE: 136 MMHG | HEIGHT: 70 IN | TEMPERATURE: 99.5 F

## 2022-09-08 DIAGNOSIS — Z12.5 PROSTATE CANCER SCREENING: ICD-10-CM

## 2022-09-08 DIAGNOSIS — E11.9 CONTROLLED TYPE 2 DIABETES MELLITUS WITHOUT COMPLICATION, WITHOUT LONG-TERM CURRENT USE OF INSULIN (HCC): ICD-10-CM

## 2022-09-08 DIAGNOSIS — I10 UNCONTROLLED HYPERTENSION: ICD-10-CM

## 2022-09-08 DIAGNOSIS — I10 ESSENTIAL HYPERTENSION: Primary | ICD-10-CM

## 2022-09-08 DIAGNOSIS — Z72.0 TOBACCO ABUSE: ICD-10-CM

## 2022-09-08 DIAGNOSIS — E78.2 MIXED HYPERLIPIDEMIA: ICD-10-CM

## 2022-09-08 LAB
ALBUMIN SERPL BCP-MCNC: 3.8 G/DL (ref 3.5–5)
ALP SERPL-CCNC: 86 U/L (ref 46–116)
ALT SERPL W P-5'-P-CCNC: 21 U/L (ref 12–78)
ANION GAP SERPL CALCULATED.3IONS-SCNC: 4 MMOL/L (ref 4–13)
AST SERPL W P-5'-P-CCNC: 13 U/L (ref 5–45)
BASOPHILS # BLD AUTO: 0.05 THOUSANDS/ΜL (ref 0–0.1)
BASOPHILS NFR BLD AUTO: 1 % (ref 0–1)
BILIRUB SERPL-MCNC: 0.43 MG/DL (ref 0.2–1)
BUN SERPL-MCNC: 11 MG/DL (ref 5–25)
CALCIUM SERPL-MCNC: 9.1 MG/DL (ref 8.3–10.1)
CHLORIDE SERPL-SCNC: 107 MMOL/L (ref 96–108)
CO2 SERPL-SCNC: 26 MMOL/L (ref 21–32)
CREAT SERPL-MCNC: 1.12 MG/DL (ref 0.6–1.3)
EOSINOPHIL # BLD AUTO: 0.04 THOUSAND/ΜL (ref 0–0.61)
EOSINOPHIL NFR BLD AUTO: 0 % (ref 0–6)
ERYTHROCYTE [DISTWIDTH] IN BLOOD BY AUTOMATED COUNT: 16.3 % (ref 11.6–15.1)
EST. AVERAGE GLUCOSE BLD GHB EST-MCNC: 117 MG/DL
ETHANOL SERPL-MCNC: <3 MG/DL (ref 0–3)
GFR SERPL CREATININE-BSD FRML MDRD: 70 ML/MIN/1.73SQ M
GLUCOSE P FAST SERPL-MCNC: 98 MG/DL (ref 65–99)
HBA1C MFR BLD: 5.7 %
HCT VFR BLD AUTO: 41.7 % (ref 36.5–49.3)
HGB BLD-MCNC: 13.5 G/DL (ref 12–17)
IMM GRANULOCYTES # BLD AUTO: 0.07 THOUSAND/UL (ref 0–0.2)
IMM GRANULOCYTES NFR BLD AUTO: 1 % (ref 0–2)
LYMPHOCYTES # BLD AUTO: 2.96 THOUSANDS/ΜL (ref 0.6–4.47)
LYMPHOCYTES NFR BLD AUTO: 30 % (ref 14–44)
MCH RBC QN AUTO: 30.6 PG (ref 26.8–34.3)
MCHC RBC AUTO-ENTMCNC: 32.4 G/DL (ref 31.4–37.4)
MCV RBC AUTO: 95 FL (ref 82–98)
MONOCYTES # BLD AUTO: 0.52 THOUSAND/ΜL (ref 0.17–1.22)
MONOCYTES NFR BLD AUTO: 5 % (ref 4–12)
NEUTROPHILS # BLD AUTO: 6.39 THOUSANDS/ΜL (ref 1.85–7.62)
NEUTS SEG NFR BLD AUTO: 63 % (ref 43–75)
NRBC BLD AUTO-RTO: 0 /100 WBCS
PLATELET # BLD AUTO: 311 THOUSANDS/UL (ref 149–390)
PMV BLD AUTO: 10.9 FL (ref 8.9–12.7)
POTASSIUM SERPL-SCNC: 3.8 MMOL/L (ref 3.5–5.3)
PROT SERPL-MCNC: 8.3 G/DL (ref 6.4–8.4)
PSA SERPL-MCNC: 0.6 NG/ML (ref 0–4)
RBC # BLD AUTO: 4.41 MILLION/UL (ref 3.88–5.62)
SODIUM SERPL-SCNC: 137 MMOL/L (ref 135–147)
WBC # BLD AUTO: 10.03 THOUSAND/UL (ref 4.31–10.16)

## 2022-09-08 PROCEDURE — 99214 OFFICE O/P EST MOD 30 MIN: CPT | Performed by: NURSE PRACTITIONER

## 2022-09-08 PROCEDURE — 85025 COMPLETE CBC W/AUTO DIFF WBC: CPT

## 2022-09-08 PROCEDURE — 36415 COLL VENOUS BLD VENIPUNCTURE: CPT

## 2022-09-08 PROCEDURE — 80053 COMPREHEN METABOLIC PANEL: CPT

## 2022-09-08 PROCEDURE — 83036 HEMOGLOBIN GLYCOSYLATED A1C: CPT

## 2022-09-08 PROCEDURE — G0103 PSA SCREENING: HCPCS

## 2022-09-08 PROCEDURE — 82077 ASSAY SPEC XCP UR&BREATH IA: CPT

## 2022-09-08 RX ORDER — LISINOPRIL 40 MG/1
40 TABLET ORAL DAILY
Qty: 90 TABLET | Refills: 1 | Status: SHIPPED | OUTPATIENT
Start: 2022-09-08 | End: 2022-10-28

## 2022-09-08 NOTE — PATIENT INSTRUCTIONS
Go to lab  Increase Lisinopril to 40 mg QD  Will send Chantix to pharmacy when lab results are back  Follow up in 6 weeks

## 2022-09-08 NOTE — PROGRESS NOTES
[address] [facility]        NAME: Frieda Gotti is a 65 y o  male  : 1960    MRN: [id number]  DATE: 2022  TIME: 2:17 PM    Assessment and Plan   Essential hypertension [I10]  1  Essential hypertension     2  Mixed hyperlipidemia     3  Controlled type 2 diabetes mellitus without complication, without long-term current use of insulin (HCC)  Comprehensive metabolic panel    HEMOGLOBIN A1C W/ EAG ESTIMATION    CBC and differential   4  Tobacco abuse  Ethanol   5  Prostate cancer screening  PSA, Total Screen   6  Uncontrolled hypertension  lisinopril (ZESTRIL) 40 mg tablet         Patient Instructions     Patient Instructions   Go to lab  Increase Lisinopril to 40 mg QD  Will send Chantix to pharmacy when lab results are back  Follow up in 6 weeks          Chief Complaint     Chief Complaint   Patient presents with    Hypertension         History of Present Illness       Pt believes he should "automatically be healthy and should not have to care for himself"  Pt continues to smoke, when he feels good, he smokes more, when he feels ill, he smokes less  Inquiring about Chantix, discussed s/e such as increased anxiety and depression and vivid dreams  Discussed the need for will power along with medication  Pt is taking his BP at home with new device, and his BP's have been running high  Pt reports he is taking his lisinopril 20 mg since Saturday  Home monitor 147/102 on left arm sitting  Manual /88 in office on left arm sitting  Review of Systems   Review of Systems   Constitutional: Positive for activity change and fatigue  Negative for diaphoresis and fever  HENT: Negative for congestion, facial swelling, hearing loss, rhinorrhea, sinus pressure, sinus pain, sneezing, sore throat and voice change  Eyes: Negative for discharge and visual disturbance  Respiratory: Negative for cough, choking, chest tightness, shortness of breath, wheezing and stridor  Cardiovascular: Negative for chest pain, palpitations and leg swelling  Gastrointestinal: Negative for abdominal distention, abdominal pain, constipation, diarrhea, nausea and vomiting  Endocrine: Negative for polydipsia, polyphagia and polyuria  Genitourinary: Negative for difficulty urinating, dysuria, frequency and urgency  Musculoskeletal: Positive for arthralgias and myalgias  Negative for back pain, gait problem, joint swelling, neck pain and neck stiffness  Skin: Negative for color change, rash and wound  Neurological: Negative for dizziness, syncope, speech difficulty, weakness, light-headedness and headaches  Hematological: Negative for adenopathy  Does not bruise/bleed easily  Psychiatric/Behavioral: Positive for dysphoric mood  Negative for agitation, behavioral problems, confusion, hallucinations, sleep disturbance and suicidal ideas  The patient is nervous/anxious  Current Medications       Current Outpatient Medications:     acetaminophen (TYLENOL) 500 mg tablet, Take 2 tablets (1,000 mg total) by mouth 3 (three) times a day, Disp: 50 tablet, Rfl: 0    atorvastatin (LIPITOR) 10 mg tablet, Take 1 tablet (10 mg total) by mouth daily at bedtime (Patient taking differently: Take 10 mg by mouth daily at bedtime Patinet taking 20Mg), Disp: 90 tablet, Rfl: 3    lisinopril (ZESTRIL) 40 mg tablet, Take 1 tablet (40 mg total) by mouth daily, Disp: 90 tablet, Rfl: 1    buPROPion (Wellbutrin SR) 100 mg 12 hr tablet, Take 1 tablet (100 mg total) by mouth 2 (two) times a day (Patient not taking: Reported on 9/8/2022), Disp: 60 tablet, Rfl: 1    colchicine (COLCRYS) 0 6 mg tablet, 1 2mg PO x 1 then 0 6mg PO 1 hour later x 1  Total of 3 pills per attack   May not repeat for 3 days (Patient not taking: No sig reported), Disp: 3 tablet, Rfl: 2    glucose blood test strip, Use as instructed (Patient not taking: No sig reported), Disp: 100 each, Rfl: 3    glucose monitoring kit (FREESTYLE) monitoring kit, Use 1 each 2 (two) times a day before meals (Patient not taking: No sig reported), Disp: 1 each, Rfl: 0    Lancets (freestyle) lancets, Use as instructed (Patient not taking: No sig reported), Disp: 100 each, Rfl: 3    meloxicam (Mobic) 15 mg tablet, Take 1 tablet (15 mg total) by mouth daily for 7 days, Disp: 7 tablet, Rfl: 0    Microlet Lancets MISC, Use 2 (two) times a day (Patient not taking: Reported on 9/8/2022), Disp: 100 each, Rfl: 2    naproxen (Naprosyn) 500 mg tablet, Take 1 tablet (500 mg total) by mouth 2 (two) times a day with meals (Patient not taking: Reported on 9/8/2022), Disp: 180 tablet, Rfl: 3    ondansetron (ZOFRAN) 4 mg tablet, Take 1 tablet (4 mg total) by mouth every 8 (eight) hours as needed for nausea or vomiting (Patient not taking: Reported on 9/8/2022), Disp: 20 tablet, Rfl: 1    Current Allergies     Allergies as of 09/08/2022    (No Known Allergies)            The following portions of the patient's history were reviewed and updated as appropriate: allergies, current medications, past family history, past medical history, past social history, past surgical history and problem list      Past Medical History:   Diagnosis Date    Gout     Osteoarthritis        History reviewed  No pertinent surgical history  Family History   Problem Relation Age of Onset    Arthritis Mother     Coronary artery disease Father          Medications have been verified  Objective   /88 (BP Location: Left arm, Patient Position: Sitting)   Pulse 98   Temp 99 5 °F (37 5 °C) (Tympanic)   Resp 20   Ht 5' 10" (1 778 m)   Wt 100 kg (221 lb)   SpO2 99%   BMI 31 71 kg/m²        Physical Exam     Physical Exam  Vitals and nursing note reviewed  Constitutional:       General: He is not in acute distress  Appearance: He is well-developed  He is not diaphoretic  Neck:      Thyroid: No thyromegaly  Trachea: No tracheal deviation     Cardiovascular:      Rate and Rhythm: Normal rate and regular rhythm  Heart sounds: Normal heart sounds  No murmur heard  Pulmonary:      Effort: Pulmonary effort is normal  No respiratory distress  Breath sounds: Normal breath sounds  No wheezing  Musculoskeletal:         General: No tenderness or deformity  Normal range of motion  Cervical back: Normal range of motion and neck supple  Skin:     General: Skin is warm and dry  Neurological:      Mental Status: He is alert and oriented to person, place, and time  Psychiatric:         Attention and Perception: Attention normal          Mood and Affect: Mood is anxious  Speech: Speech is rapid and pressured  Behavior: Behavior is agitated  Thought Content:  Thought content normal          Cognition and Memory: Cognition and memory normal          Judgment: Judgment normal

## 2022-09-09 DIAGNOSIS — Z72.0 TOBACCO ABUSE: Primary | ICD-10-CM

## 2022-09-09 NOTE — TELEPHONE ENCOUNTER
----- Message from Dominic Rahman, [address] Patel sent at 9/9/2022 10:07 AM EDT -----  Please let patient know his bloodwork results are very good- he can start Chantix- can you send starter pack to me for approval?

## 2022-09-09 NOTE — TELEPHONE ENCOUNTER
Spoke with patient in regards to results and provider's recommendations; pt verbalized an understanding  Please approve medication

## 2022-10-18 ENCOUNTER — OFFICE VISIT (OUTPATIENT)
Dept: FAMILY MEDICINE CLINIC | Facility: CLINIC | Age: 62
End: 2022-10-18
Payer: MEDICARE

## 2022-10-18 VITALS
OXYGEN SATURATION: 98 % | HEIGHT: 70 IN | HEART RATE: 88 BPM | BODY MASS INDEX: 31.92 KG/M2 | RESPIRATION RATE: 24 BRPM | WEIGHT: 223 LBS | DIASTOLIC BLOOD PRESSURE: 76 MMHG | SYSTOLIC BLOOD PRESSURE: 120 MMHG | TEMPERATURE: 98.2 F

## 2022-10-18 DIAGNOSIS — R11.0 NAUSEA: ICD-10-CM

## 2022-10-18 DIAGNOSIS — Z72.0 TOBACCO ABUSE: ICD-10-CM

## 2022-10-18 DIAGNOSIS — E78.2 MIXED HYPERLIPIDEMIA: ICD-10-CM

## 2022-10-18 DIAGNOSIS — Z00.00 ANNUAL PHYSICAL EXAM: Primary | ICD-10-CM

## 2022-10-18 PROCEDURE — 99396 PREV VISIT EST AGE 40-64: CPT | Performed by: NURSE PRACTITIONER

## 2022-10-18 PROCEDURE — 99213 OFFICE O/P EST LOW 20 MIN: CPT | Performed by: NURSE PRACTITIONER

## 2022-10-18 RX ORDER — ONDANSETRON 4 MG/1
4 TABLET, FILM COATED ORAL EVERY 8 HOURS PRN
Qty: 30 TABLET | Refills: 1 | Status: SHIPPED | OUTPATIENT
Start: 2022-10-18

## 2022-10-18 NOTE — PROGRESS NOTES
[address] [facility]        NAME: Amira Craig is a 65 y o  male  : 1960    MRN: [id number]  DATE: 2022  TIME: 8:39 AM    Assessment and Plan   Nausea [R11 0]  1  Nausea  ondansetron (ZOFRAN) 4 mg tablet   2  Mixed hyperlipidemia  Lipid panel   3  Tobacco abuse     4  Annual physical exam           Patient Instructions     Patient Instructions   Continue Lisinopril 40mg daily  Discussed Chantix and smoking- he is agreeable to try to quit smoking all together 100%  Zofran as discussed for nausea  Get repeat Cholesterol level done- fast for 10-12 hours  Will then re-evaluate if medication is needed  Keep appointment 10/21 for follow up          Chief Complaint     Chief Complaint   Patient presents with   • Nausea         History of Present Illness       Here to discuss nausea and feeling sick  Is taking Chantix now- is still smoking but is now smoking less  He reports he was smoking 5-6 packs of cigarettes a day when he started  He would sleep 2 hours a night and smoke the rest of the night  He will not take any medications other than Lisinopril and Chantix because he felt like a "poisoned animal staggering around"  He is agreeable to get his cholesterol level rechecked and return next week for follow up  Review of Systems   Review of Systems   Constitutional: Positive for activity change and fatigue  Negative for diaphoresis and fever  HENT: Negative for congestion, facial swelling, hearing loss, rhinorrhea, sinus pressure, sinus pain, sneezing, sore throat and voice change  Eyes: Negative for discharge and visual disturbance  Respiratory: Negative for cough, choking, chest tightness, shortness of breath, wheezing and stridor  Cardiovascular: Negative for chest pain, palpitations and leg swelling  Gastrointestinal: Positive for nausea  Negative for abdominal distention, abdominal pain, constipation, diarrhea and vomiting     Endocrine: Negative for polydipsia, polyphagia and polyuria  Genitourinary: Negative for difficulty urinating, dysuria, frequency and urgency  Musculoskeletal: Negative for arthralgias, back pain, gait problem, joint swelling, myalgias, neck pain and neck stiffness  Skin: Negative for color change, rash and wound  Neurological: Negative for dizziness, syncope, speech difficulty, weakness, light-headedness and headaches  Hematological: Negative for adenopathy  Does not bruise/bleed easily  Psychiatric/Behavioral: Positive for dysphoric mood and sleep disturbance  Negative for agitation, behavioral problems, confusion, hallucinations and suicidal ideas  The patient is nervous/anxious  Current Medications       Current Outpatient Medications:   •  acetaminophen (TYLENOL) 500 mg tablet, Take 2 tablets (1,000 mg total) by mouth 3 (three) times a day, Disp: 50 tablet, Rfl: 0  •  ondansetron (ZOFRAN) 4 mg tablet, Take 1 tablet (4 mg total) by mouth every 8 (eight) hours as needed for nausea or vomiting, Disp: 30 tablet, Rfl: 1  •  atorvastatin (LIPITOR) 10 mg tablet, Take 1 tablet (10 mg total) by mouth daily at bedtime (Patient taking differently: Take 10 mg by mouth daily at bedtime Patinet taking 20Mg), Disp: 90 tablet, Rfl: 3  •  buPROPion (Wellbutrin SR) 100 mg 12 hr tablet, Take 1 tablet (100 mg total) by mouth 2 (two) times a day (Patient not taking: Reported on 9/8/2022), Disp: 60 tablet, Rfl: 1  •  colchicine (COLCRYS) 0 6 mg tablet, 1 2mg PO x 1 then 0 6mg PO 1 hour later x 1  Total of 3 pills per attack   May not repeat for 3 days (Patient not taking: No sig reported), Disp: 3 tablet, Rfl: 2  •  glucose blood test strip, Use as instructed (Patient not taking: No sig reported), Disp: 100 each, Rfl: 3  •  glucose monitoring kit (FREESTYLE) monitoring kit, Use 1 each 2 (two) times a day before meals (Patient not taking: No sig reported), Disp: 1 each, Rfl: 0  •  Lancets (freestyle) lancets, Use as instructed (Patient not taking: No sig reported), Disp: 100 each, Rfl: 3  •  lisinopril (ZESTRIL) 40 mg tablet, Take 1 tablet (40 mg total) by mouth daily, Disp: 90 tablet, Rfl: 1  •  meloxicam (Mobic) 15 mg tablet, Take 1 tablet (15 mg total) by mouth daily for 7 days, Disp: 7 tablet, Rfl: 0  •  Microlet Lancets MISC, Use 2 (two) times a day (Patient not taking: Reported on 9/8/2022), Disp: 100 each, Rfl: 2  •  varenicline (CHANTIX SUSANA) 0 5 MG X 11 & 1 MG X 42 tablet, Take one 0 5 mg tablet by mouth once daily for 3 days, then one 0 5 mg tablet by mouth twice daily for 4 days, then one 1 mg tablet by mouth twice daily  , Disp: 53 tablet, Rfl: 0    Current Allergies     Allergies as of 10/18/2022   • (No Known Allergies)            The following portions of the patient's history were reviewed and updated as appropriate: allergies, current medications, past family history, past medical history, past social history, past surgical history and problem list      Past Medical History:   Diagnosis Date   • Gout    • Osteoarthritis        History reviewed  No pertinent surgical history  Family History   Problem Relation Age of Onset   • Arthritis Mother    • Coronary artery disease Father          Medications have been verified  Objective   /76   Pulse 88   Temp 98 2 °F (36 8 °C) (Tympanic)   Resp (!) 24   Ht 5' 10" (1 778 m)   Wt 101 kg (223 lb)   SpO2 98%   BMI 32 00 kg/m²        Physical Exam     Physical Exam  Vitals and nursing note reviewed  Constitutional:       General: He is not in acute distress  Appearance: He is well-developed  He is not diaphoretic  HENT:      Right Ear: Tympanic membrane, ear canal and external ear normal       Left Ear: Tympanic membrane, ear canal and external ear normal       Mouth/Throat:      Mouth: Mucous membranes are moist       Pharynx: Oropharynx is clear  Eyes:      General:         Right eye: No discharge  Left eye: No discharge        Conjunctiva/sclera: Conjunctivae normal       Pupils: Pupils are equal, round, and reactive to light  Neck:      Thyroid: No thyromegaly  Trachea: No tracheal deviation  Cardiovascular:      Rate and Rhythm: Normal rate and regular rhythm  Heart sounds: Normal heart sounds  No murmur heard  Pulmonary:      Effort: Pulmonary effort is normal  No respiratory distress  Breath sounds: Normal breath sounds  No wheezing  Musculoskeletal:         General: No tenderness or deformity  Normal range of motion  Cervical back: Normal range of motion and neck supple  Skin:     General: Skin is warm and dry  Neurological:      Mental Status: He is alert and oriented to person, place, and time  Psychiatric:         Attention and Perception: Attention normal          Mood and Affect: Mood is anxious  Speech: Speech normal          Behavior: Behavior is agitated  Thought Content:  Thought content normal          Cognition and Memory: Cognition and memory normal          Judgment: Judgment normal

## 2022-10-18 NOTE — PATIENT INSTRUCTIONS
Continue Lisinopril 40mg daily  Discussed Chantix and smoking- he is agreeable to try to quit smoking all together 100%  Zofran as discussed for nausea  Get repeat Cholesterol level done- fast for 10-12 hours   Will then re-evaluate if medication is needed  Keep appointment 10/21 for follow up no

## 2022-10-18 NOTE — PROGRESS NOTES
HPI:  Gisel Calhoun is a 65 y o  male here for his yearly health maintenance exam    Patient Active Problem List   Diagnosis   • Gout   • Tobacco abuse   • Essential hypertension   • Hypercholesteremia   • Encounter for diabetic foot exam ([facility] [address] )   • Diabetic eye exam ([facility] )   • Class 1 obesity due to excess calories with serious comorbidity and body mass index (BMI) of 31 0 to 31 9 in adult   • Controlled type 2 diabetes mellitus without complication, without long-term current use of insulin (HCC)   • Other headache syndrome     Past Medical History:   Diagnosis Date   • Gout    • Osteoarthritis          PHQ-2/9 Depression Screening    Little interest or pleasure in doing things: 0 - not at all  Feeling down, depressed, or hopeless: 0 - not at all  PHQ-2 Score: 0  PHQ-2 Interpretation: Negative depression screen           Current Outpatient Medications   Medication Sig Dispense Refill   • acetaminophen (TYLENOL) 500 mg tablet Take 2 tablets (1,000 mg total) by mouth 3 (three) times a day 50 tablet 0   • ondansetron (ZOFRAN) 4 mg tablet Take 1 tablet (4 mg total) by mouth every 8 (eight) hours as needed for nausea or vomiting 30 tablet 1   • atorvastatin (LIPITOR) 10 mg tablet Take 1 tablet (10 mg total) by mouth daily at bedtime (Patient taking differently: Take 10 mg by mouth daily at bedtime Patinet taking 20Mg) 90 tablet 3   • buPROPion (Wellbutrin SR) 100 mg 12 hr tablet Take 1 tablet (100 mg total) by mouth 2 (two) times a day (Patient not taking: Reported on 9/8/2022) 60 tablet 1   • colchicine (COLCRYS) 0 6 mg tablet 1 2mg PO x 1 then 0 6mg PO 1 hour later x 1  Total of 3 pills per attack   May not repeat for 3 days (Patient not taking: No sig reported) 3 tablet 2   • glucose blood test strip Use as instructed (Patient not taking: No sig reported) 100 each 3   • glucose monitoring kit (FREESTYLE) monitoring kit Use 1 each 2 (two) times a day before meals (Patient not taking: No sig reported) 1 each 0   • Lancets (freestyle) lancets Use as instructed (Patient not taking: No sig reported) 100 each 3   • lisinopril (ZESTRIL) 40 mg tablet Take 1 tablet (40 mg total) by mouth daily 90 tablet 1   • meloxicam (Mobic) 15 mg tablet Take 1 tablet (15 mg total) by mouth daily for 7 days 7 tablet 0   • Microlet Lancets MISC Use 2 (two) times a day (Patient not taking: Reported on 9/8/2022) 100 each 2   • varenicline (CHANTIX SUSANA) 0 5 MG X 11 & 1 MG X 42 tablet Take one 0 5 mg tablet by mouth once daily for 3 days, then one 0 5 mg tablet by mouth twice daily for 4 days, then one 1 mg tablet by mouth twice daily  53 tablet 0     No current facility-administered medications for this visit  No Known Allergies  Immunization History   Administered Date(s) Administered   • COVID-19 PFIZER VACCINE 0 3 ML IM 04/26/2021, 05/17/2021       Patient Care Team:  Verito Abarca as PCP - General (Family Medicine)  Ophelia Handley DO as PCP - [address] [place] [address] (RTE)  Ophelia Handley DO as PCP - [facility] (RTE)    Review of Systems   Constitutional: Positive for activity change and fatigue  Negative for diaphoresis and fever  HENT: Negative for congestion, facial swelling, hearing loss, rhinorrhea, sinus pressure, sinus pain, sneezing, sore throat and voice change  Eyes: Negative for discharge and visual disturbance  Respiratory: Negative for cough, choking, chest tightness, shortness of breath, wheezing and stridor  Cardiovascular: Negative for chest pain, palpitations and leg swelling  Gastrointestinal: Positive for nausea  Negative for abdominal distention, abdominal pain, constipation, diarrhea and vomiting  Endocrine: Negative for polydipsia, polyphagia and polyuria  Genitourinary: Negative for difficulty urinating, dysuria, frequency and urgency  Musculoskeletal: Negative for arthralgias, back pain, gait problem, joint swelling, myalgias, neck pain and neck stiffness     Skin: Negative for color change, rash and wound  Neurological: Negative for dizziness, syncope, speech difficulty, weakness, light-headedness and headaches  Hematological: Negative for adenopathy  Does not bruise/bleed easily  Psychiatric/Behavioral: Positive for dysphoric mood and sleep disturbance  Negative for agitation, behavioral problems, confusion, hallucinations and suicidal ideas  The patient is nervous/anxious  Physical Exam :  Physical Exam  Vitals and nursing note reviewed  Constitutional:       General: He is not in acute distress  Appearance: He is well-developed  He is not diaphoretic  HENT:      Head: Normocephalic and atraumatic  Right Ear: Tympanic membrane, ear canal and external ear normal       Left Ear: Tympanic membrane, ear canal and external ear normal       Nose: Nose normal       Mouth/Throat:      Mouth: Mucous membranes are moist       Pharynx: Oropharynx is clear  Uvula midline  No oropharyngeal exudate  Eyes:      General:         Right eye: No discharge  Left eye: No discharge  Conjunctiva/sclera: Conjunctivae normal       Pupils: Pupils are equal, round, and reactive to light  Neck:      Thyroid: No thyromegaly  Trachea: No tracheal deviation  Cardiovascular:      Rate and Rhythm: Normal rate and regular rhythm  Heart sounds: Normal heart sounds  No murmur heard  Pulmonary:      Effort: Pulmonary effort is normal  No respiratory distress  Breath sounds: Normal breath sounds  No wheezing  Musculoskeletal:         General: Normal range of motion  Cervical back: Normal range of motion and neck supple  Lymphadenopathy:      Cervical: No cervical adenopathy  Skin:     General: Skin is warm and dry  Neurological:      Mental Status: He is alert and oriented to person, place, and time  Psychiatric:         Attention and Perception: Attention normal          Mood and Affect: Mood is anxious  Behavior: Behavior is agitated           Thought Content: Thought content normal          Cognition and Memory: Cognition and memory normal          Judgment: Judgment normal            Reviewed Updated [facility] Prior Wellness Visits:   Last Health Maintenance visit information was reviewed, patient interviewed , no change since last HM visit yes  Last HM visit information was reviewed, patient interviewed and updates made to the record today yes    Assessment and Plan:  1  Annual physical exam     2  Nausea  ondansetron (ZOFRAN) 4 mg tablet   3  Mixed hyperlipidemia  Lipid panel   4   Tobacco abuse         Health Maintenance Due   Topic Date Due   • Pneumococcal Vaccine: Pediatrics (0 to 5 Years) and At-Risk Patients (6 to 59 Years) (1 - PCV) Never done   • HIV Screening  Never done   • DTaP,Tdap,and Td Vaccines (1 - Tdap) Never done   • Lung Cancer Screening  Never done   • COVID-19 Vaccine (3 - Booster for Pfizer series) 10/17/2021   • Influenza Vaccine (1) Never done   • Annual Physical  09/20/2022   • BMI: Followup Plan  02/01/2023

## 2022-10-21 DIAGNOSIS — F41.9 ANXIETY: Primary | ICD-10-CM

## 2022-10-21 RX ORDER — ESCITALOPRAM OXALATE 5 MG/1
5 TABLET ORAL DAILY
Qty: 30 TABLET | Refills: 5 | Status: SHIPPED | OUTPATIENT
Start: 2022-10-21

## 2022-10-21 NOTE — TELEPHONE ENCOUNTER
Received call from patient requesting something to be sent for his anxiety  Patient states he talked to you about this on Tuesday  Patient also states that he did not get bloodwork completed yet as he is going to go with his mom prior to 11/9  Please advise

## 2022-10-28 ENCOUNTER — TELEPHONE (OUTPATIENT)
Dept: FAMILY MEDICINE CLINIC | Facility: CLINIC | Age: 62
End: 2022-10-28

## 2022-10-28 DIAGNOSIS — I10 ESSENTIAL HYPERTENSION: Primary | ICD-10-CM

## 2022-10-28 RX ORDER — AMLODIPINE BESYLATE 5 MG/1
5 TABLET ORAL DAILY
Qty: 90 TABLET | Refills: 1 | Status: SHIPPED | OUTPATIENT
Start: 2022-10-28

## 2022-10-28 NOTE — TELEPHONE ENCOUNTER
Quincy Perez, you can have him stop Lisinopril and switch him to Amlodipine 5mg daily- please send to me for approval if he is agreeable

## 2022-10-28 NOTE — TELEPHONE ENCOUNTER
Pt stopped in and stated that he took Lexapro and felt fine  Then 4 hrs later he took his Lisinopril, but only 20 mg, and is feeling  Nausea, dizzy, like a hang over    He is questioning that the BP meds is what is making him feel sick      Please advise    Phone: [phone]

## 2022-11-02 ENCOUNTER — CLINICAL SUPPORT (OUTPATIENT)
Dept: FAMILY MEDICINE CLINIC | Facility: CLINIC | Age: 62
End: 2022-11-02

## 2022-11-02 ENCOUNTER — TELEPHONE (OUTPATIENT)
Dept: FAMILY MEDICINE CLINIC | Facility: CLINIC | Age: 62
End: 2022-11-02

## 2022-11-02 ENCOUNTER — APPOINTMENT (OUTPATIENT)
Dept: LAB | Facility: CLINIC | Age: 62
End: 2022-11-02

## 2022-11-02 VITALS — SYSTOLIC BLOOD PRESSURE: 164 MMHG | DIASTOLIC BLOOD PRESSURE: 94 MMHG

## 2022-11-02 DIAGNOSIS — E78.2 MIXED HYPERLIPIDEMIA: ICD-10-CM

## 2022-11-02 DIAGNOSIS — I10 ESSENTIAL HYPERTENSION: Primary | ICD-10-CM

## 2022-11-02 LAB
CHOLEST SERPL-MCNC: 244 MG/DL
HDLC SERPL-MCNC: 40 MG/DL
LDLC SERPL CALC-MCNC: 170 MG/DL (ref 0–100)
NONHDLC SERPL-MCNC: 204 MG/DL
TRIGL SERPL-MCNC: 172 MG/DL

## 2022-11-02 NOTE — TELEPHONE ENCOUNTER
Patient walked in for BP check today  Reports that last night his BP was 190/106 and this morning was 162/96  Patient sat for a few minutes prior to BP being taken  BP was taken on left arm  Reading was 164/94  Reports he has not had any caffeine today  Does not have any headaches, dizziness, chest pressure  Said that he   "feels completely fine"  Said he just started the Amlodipine 5mg on Monday  Did not want to start it over the weekend  Spoke to Dr Marcia Salazar about patient's readings  Said for patient to start taking 10mg of the Amlodipine daily and to call back Friday with at home readings  Informed patient of provider's instructions  He verbalized understanding and said he would call back Friday

## 2022-11-07 ENCOUNTER — TELEPHONE (OUTPATIENT)
Dept: FAMILY MEDICINE CLINIC | Facility: CLINIC | Age: 62
End: 2022-11-07

## 2022-11-07 ENCOUNTER — APPOINTMENT (EMERGENCY)
Dept: RADIOLOGY | Facility: HOSPITAL | Age: 62
End: 2022-11-07

## 2022-11-07 ENCOUNTER — HOSPITAL ENCOUNTER (EMERGENCY)
Facility: HOSPITAL | Age: 62
Discharge: HOME | End: 2022-11-07
Attending: EMERGENCY MEDICINE

## 2022-11-07 VITALS
DIASTOLIC BLOOD PRESSURE: 84 MMHG | BODY MASS INDEX: 31.57 KG/M2 | TEMPERATURE: 98.2 F | OXYGEN SATURATION: 98 % | WEIGHT: 220 LBS | RESPIRATION RATE: 18 BRPM | HEART RATE: 90 BPM | SYSTOLIC BLOOD PRESSURE: 146 MMHG

## 2022-11-07 DIAGNOSIS — I10 HTN (HYPERTENSION): Primary | ICD-10-CM

## 2022-11-07 DIAGNOSIS — R07.89 ATYPICAL CHEST PAIN: ICD-10-CM

## 2022-11-07 DIAGNOSIS — Z72.0 TOBACCO ABUSE: ICD-10-CM

## 2022-11-07 LAB
ALBUMIN SERPL BCP-MCNC: 4.3 G/DL (ref 3.5–5)
ALP SERPL-CCNC: 74 U/L (ref 34–104)
ALT SERPL W P-5'-P-CCNC: 14 U/L (ref 7–52)
ANION GAP SERPL CALCULATED.3IONS-SCNC: 8 MMOL/L (ref 4–13)
AST SERPL W P-5'-P-CCNC: 10 U/L (ref 13–39)
ATRIAL RATE: 97 BPM
BASOPHILS # BLD AUTO: 0.05 THOUSANDS/ÂΜL (ref 0–0.1)
BASOPHILS NFR BLD AUTO: 1 % (ref 0–1)
BILIRUB SERPL-MCNC: 0.24 MG/DL (ref 0.2–1)
BNP SERPL-MCNC: 34 PG/ML (ref 0–100)
BUN SERPL-MCNC: 19 MG/DL (ref 5–25)
CALCIUM SERPL-MCNC: 9.2 MG/DL (ref 8.4–10.2)
CARDIAC TROPONIN I PNL SERPL HS: 4 NG/L
CHLORIDE SERPL-SCNC: 104 MMOL/L (ref 96–108)
CO2 SERPL-SCNC: 22 MMOL/L (ref 21–32)
CREAT SERPL-MCNC: 1.17 MG/DL (ref 0.6–1.3)
EOSINOPHIL # BLD AUTO: 0.1 THOUSAND/ÂΜL (ref 0–0.61)
EOSINOPHIL NFR BLD AUTO: 1 % (ref 0–6)
ERYTHROCYTE [DISTWIDTH] IN BLOOD BY AUTOMATED COUNT: 13.2 % (ref 11.6–15.1)
GFR SERPL CREATININE-BSD FRML MDRD: 66 ML/MIN/1.73SQ M
GLUCOSE SERPL-MCNC: 134 MG/DL (ref 65–140)
HCT VFR BLD AUTO: 38.6 % (ref 36.5–49.3)
HGB BLD-MCNC: 13 G/DL (ref 12–17)
IMM GRANULOCYTES # BLD AUTO: 0.08 THOUSAND/UL (ref 0–0.2)
IMM GRANULOCYTES NFR BLD AUTO: 1 % (ref 0–2)
LYMPHOCYTES # BLD AUTO: 2.95 THOUSANDS/ÂΜL (ref 0.6–4.47)
LYMPHOCYTES NFR BLD AUTO: 30 % (ref 14–44)
MAGNESIUM SERPL-MCNC: 2.1 MG/DL (ref 1.9–2.7)
MCH RBC QN AUTO: 31.9 PG (ref 26.8–34.3)
MCHC RBC AUTO-ENTMCNC: 33.7 G/DL (ref 31.4–37.4)
MCV RBC AUTO: 95 FL (ref 82–98)
MONOCYTES # BLD AUTO: 0.67 THOUSAND/ÂΜL (ref 0.17–1.22)
MONOCYTES NFR BLD AUTO: 7 % (ref 4–12)
NEUTROPHILS # BLD AUTO: 6.16 THOUSANDS/ÂΜL (ref 1.85–7.62)
NEUTS SEG NFR BLD AUTO: 60 % (ref 43–75)
NRBC BLD AUTO-RTO: 0 /100 WBCS
P AXIS: 54 DEGREES
PLATELET # BLD AUTO: 266 THOUSANDS/UL (ref 149–390)
PMV BLD AUTO: 10.3 FL (ref 8.9–12.7)
POTASSIUM SERPL-SCNC: 3.7 MMOL/L (ref 3.5–5.3)
PR INTERVAL: 124 MS
PROT SERPL-MCNC: 7.8 G/DL (ref 6.4–8.4)
QRS AXIS: 15 DEGREES
QRSD INTERVAL: 70 MS
QT INTERVAL: 340 MS
QTC INTERVAL: 431 MS
RBC # BLD AUTO: 4.07 MILLION/UL (ref 3.88–5.62)
SODIUM SERPL-SCNC: 134 MMOL/L (ref 135–147)
T WAVE AXIS: 56 DEGREES
VENTRICULAR RATE: 97 BPM
WBC # BLD AUTO: 10.01 THOUSAND/UL (ref 4.31–10.16)

## 2022-11-07 NOTE — TELEPHONE ENCOUNTER
Ana Maria Uriarte called and stated that at 4 am he took his BP and stated that 190/ ? , He was talking very fast  But he went to the ER room and stated that the ran all kinds of test and his BP was 146/?   Everything came back normal and very good        Ana Maria Uriarte had determined that his BP machine is not accurate and to forget what he reported to [facility]    His BP today 146/84    Just an [place]

## 2022-11-07 NOTE — ED TRIAGE NOTES
Patient reports pain is intermittent   He also states that there have been recent BP medication changes recently

## 2022-11-07 NOTE — ED PROVIDER NOTES
History  No chief complaint on file  HPI     This is a very pleasant, nontoxic, 58-year-old gentleman presents the emergency with chest pressure started approximately 3:00 a m  this morning  Patient got up felt mildly dizzy which resolved spontaneously and became concerned because he had mild headache that he took his blood pressure was 882 systolic  Patient recently was seen evaluated by the PCP in which she was advised to take 2 tablets of his Norvasc medication, 5 mg dosage  This change in his blood pressure medication occurred 5 days ago  Patient is currently pain-free, patient denies any upper extremity pain, back pain, nausea, diaphoresis, exertional shortness of breath  Patient is a diabetic and his blood sugars have been running in the 120s to 140 range  Prior to Admission Medications   Prescriptions Last Dose Informant Patient Reported? Taking? Lancets (freestyle) lancets   No No   Sig: Use as instructed   Patient not taking: No sig reported   Microlet Lancets MISC   No No   Sig: Use 2 (two) times a day   Patient not taking: Reported on 2022   acetaminophen (TYLENOL) 500 mg tablet   No No   Sig: Take 2 tablets (1,000 mg total) by mouth 3 (three) times a day   amLODIPine (NORVASC) 5 mg tablet   No No   Sig: Take 1 tablet (5 mg total) by mouth daily   atorvastatin (LIPITOR) 10 mg tablet   No No   Sig: Take 1 tablet (10 mg total) by mouth daily at bedtime   Patient taking differently: Take 10 mg by mouth daily at bedtime Patinet taking 20Mg   buPROPion (Wellbutrin SR) 100 mg 12 hr tablet   No No   Sig: Take 1 tablet (100 mg total) by mouth 2 (two) times a day   Patient not taking: Reported on 2022   colchicine (COLCRYS) 0 6 mg tablet   No No   Si 2mg PO x 1 then 0 6mg PO 1 hour later x 1  Total of 3 pills per attack   May not repeat for 3 days   Patient not taking: No sig reported   escitalopram (LEXAPRO) 5 mg tablet   No No   Sig: Take 1 tablet (5 mg total) by mouth daily   glucose blood test strip   No No   Sig: Use as instructed   Patient not taking: No sig reported   glucose monitoring kit (FREESTYLE) monitoring kit   No No   Sig: Use 1 each 2 (two) times a day before meals   Patient not taking: No sig reported   ondansetron (ZOFRAN) 4 mg tablet   No No   Sig: Take 1 tablet (4 mg total) by mouth every 8 (eight) hours as needed for nausea or vomiting   varenicline (CHANTIX SUSANA) 0 5 MG X 11 & 1 MG X 42 tablet   No No   Sig: Take one 0 5 mg tablet by mouth once daily for 3 days, then one 0 5 mg tablet by mouth twice daily for 4 days, then one 1 mg tablet by mouth twice daily  Facility-Administered Medications: None       Past Medical History:   Diagnosis Date   • Gout    • Hypertension    • Osteoarthritis        History reviewed  No pertinent surgical history  Family History   Problem Relation Age of Onset   • Arthritis Mother    • Coronary artery disease Father      I have reviewed and agree with the history as documented  E-Cigarette/Vaping   • E-Cigarette Use Never User      E-Cigarette/Vaping Substances   • Nicotine No    • THC No    • CBD No    • Flavoring No    • Other No    • Unknown No      Social History     Tobacco Use   • Smoking status: Current Every Day Smoker     Packs/day: 3 00     Years: 15 00     Pack years: 45 00     Types: Cigarettes   • Smokeless tobacco: Never Used   • Tobacco comment: Flucuates more and less,   Vaping Use   • Vaping Use: Never used   Substance Use Topics   • Alcohol use: Not Currently     Comment: occasionally   • Drug use: No       Review of Systems   Constitutional: Negative  HENT: Negative  Eyes: Negative  Respiratory: Negative  Negative for chest tightness and shortness of breath  Cardiovascular: Positive for chest pain  Gastrointestinal: Negative  Endocrine: Negative  Genitourinary: Negative  Musculoskeletal: Negative  Skin: Negative  Allergic/Immunologic: Negative  Neurological: Negative      Hematological: Negative  Psychiatric/Behavioral: Negative  Physical Exam  Physical Exam  Vitals and nursing note reviewed  Constitutional:       Appearance: Normal appearance  He is normal weight  HENT:      Head: Normocephalic and atraumatic  Right Ear: External ear normal       Left Ear: External ear normal       Nose: Nose normal       Mouth/Throat:      Mouth: Mucous membranes are moist       Pharynx: Oropharynx is clear  Eyes:      Extraocular Movements: Extraocular movements intact  Conjunctiva/sclera: Conjunctivae normal       Pupils: Pupils are equal, round, and reactive to light  Cardiovascular:      Rate and Rhythm: Normal rate and regular rhythm  Pulses: Normal pulses  Heart sounds: Normal heart sounds  Pulmonary:      Effort: Pulmonary effort is normal       Breath sounds: Normal breath sounds  Abdominal:      General: Abdomen is flat  Bowel sounds are normal    Musculoskeletal:         General: Normal range of motion  Cervical back: Normal range of motion  Skin:     General: Skin is warm  Capillary Refill: Capillary refill takes less than 2 seconds  Neurological:      General: No focal deficit present  Mental Status: He is alert and oriented to person, place, and time  Mental status is at baseline  Psychiatric:         Mood and Affect: Mood normal          Behavior: Behavior normal          Thought Content:  Thought content normal          Judgment: Judgment normal          Vital Signs  ED Triage Vitals [11/07/22 0649]   Temperature Pulse Respirations Blood Pressure SpO2   98 2 °F (36 8 °C) (!) 106 18 (!) 173/99 98 %      Temp Source Heart Rate Source Patient Position - Orthostatic VS BP Location FiO2 (%)   Oral Monitor -- -- --      Pain Score       No Pain           Vitals:    11/07/22 0649 11/07/22 0740   BP: (!) 173/99 146/84   Pulse: (!) 106 90         Visual Acuity      ED Medications  Medications - No data to display    Diagnostic Studies  Results Reviewed     Procedure Component Value Units Date/Time    HS Troponin I 2hr [[id number]]     Lab Status: No result Specimen: Blood     HS Troponin I 4hr [[id number]]     Lab Status: No result Specimen: Blood     HS Troponin 0hr (reflex protocol) [[id number]]  (Normal) Collected: 11/07/22 0708    Lab Status: Final result Specimen: Blood from Arm, Right Updated: 11/07/22 0810     hs TnI 0hr 4 ng/L     B-Type Natriuretic Peptide(BNP) AN, CA, EA Campuses Only [[id number]]  (Normal) Collected: 11/07/22 0708    Lab Status: Final result Specimen: Blood from Arm, Right Updated: 11/07/22 0742     BNP 34 pg/mL     CMP [[id number]]  (Abnormal) Collected: 11/07/22 0708    Lab Status: Final result Specimen: Blood from Arm, Right Updated: 11/07/22 0730     Sodium 134 mmol/L      Potassium 3 7 mmol/L      Chloride 104 mmol/L      CO2 22 mmol/L      ANION GAP 8 mmol/L      BUN 19 mg/dL      Creatinine 1 17 mg/dL      Glucose 134 mg/dL      Calcium 9 2 mg/dL      AST 10 U/L      ALT 14 U/L      Alkaline Phosphatase 74 U/L      Total Protein 7 8 g/dL      Albumin 4 3 g/dL      Total Bilirubin 0 24 mg/dL      eGFR 66 ml/min/1 73sq m     Narrative:      [place] guidelines for Chronic Kidney Disease (CKD):   •  Stage 1 with normal or high GFR (GFR > 90 mL/min/1 73 square meters)  •  Stage 2 Mild CKD (GFR = 60-89 mL/min/1 73 square meters)  •  Stage 3A Moderate CKD (GFR = 45-59 mL/min/1 73 square meters)  •  Stage 3B Moderate CKD (GFR = 30-44 mL/min/1 73 square meters)  •  Stage 4 Severe CKD (GFR = 15-29 mL/min/1 73 square meters)  •  Stage 5 End Stage CKD (GFR <15 mL/min/1 73 square meters)  Note: GFR calculation is accurate only with a steady state creatinine    Magnesium [[id number]]  (Normal) Collected: 11/07/22 0708    Lab Status: Final result Specimen: Blood from Arm, Right Updated: 11/07/22 0730     Magnesium 2 1 mg/dL     CBC and differential [[id number]] Collected: 11/07/22 0708    Lab Status: Final result Specimen: Blood from Arm, Right Updated: 11/07/22 0719     WBC 10 01 Thousand/uL      RBC 4 07 Million/uL      Hemoglobin 13 0 g/dL      Hematocrit 38 6 %      MCV 95 fL      MCH 31 9 pg      MCHC 33 7 g/dL      RDW 13 2 %      MPV 10 3 fL      Platelets 515 Thousands/uL      nRBC 0 /100 WBCs      Neutrophils Relative 60 %      Immat GRANS % 1 %      Lymphocytes Relative 30 %      Monocytes Relative 7 %      Eosinophils Relative 1 %      Basophils Relative 1 %      Neutrophils Absolute 6 16 Thousands/µL      Immature Grans Absolute 0 08 Thousand/uL      Lymphocytes Absolute 2 95 Thousands/µL      Monocytes Absolute 0 67 Thousand/µL      Eosinophils Absolute 0 10 Thousand/µL      Basophils Absolute 0 05 Thousands/µL                  XR chest 1 view portable   Final Result by Renetta Banegas MD (11/07 1789)      No acute cardiopulmonary disease  Workstation performed: [id number]                    Procedures  ECG 12 Lead Documentation Only    Date/Time: 11/7/2022 7:06 AM  Performed by: Americo Pineda DO  Authorized by: Alicia Torres III, DO     Indications / Diagnosis:  Elevated BP  ECG reviewed by me, the ED Provider: yes    Patient location:  ED  Comments:      I personally reviewed this EKG is performed the patient November 7, 2022, EKG was completed at 7:05 a m  and interpreted by me at 7:06 a m , normal sinus rhythm with ventricular rate of 97 beats per minute, no acute ST abnormalities  No diffuse elevations to indicate pericarditis  No coved ST elevations greater than 2mm with negative T waves in V1-3 to indicate concern for brugada  No biphasic T waves in V2, V3 to indicate Wellens (critical stenosis of LAD)  No elevation in aVR or deviation when compared to V1 (can be associated with ST depression in I,II, V4-6 when left main occlusion is present)               ED Course  ED Course as of 11/07/22 0954   [facility] Nov 07, 2022   4181 Patient is reassessed, patient's blood pressure is 200 systolic, patient's symptoms have resolved  Patient was informed by his PCP to instead take 1 tablet of Norvasc 5 mg daily to increase the dose to 10 mg daily  HEART Risk Score    Flowsheet Row Most Recent Value   Heart Score Risk Calculator    History 0 Filed at: 11/07/2022 0825   ECG 0 Filed at: 11/07/2022 0825   Age 1 Filed at: 11/07/2022 0825   Risk Factors 1 Filed at: 11/07/2022 0825   Troponin 0 Filed at: 11/07/2022 0825   HEART Score 2 Filed at: 11/07/2022 0825                                      MDM  Number of Diagnoses or Management Options  Atypical chest pain  HTN (hypertension)  Tobacco abuse  Diagnosis management comments: With no intervention, blood pressure came down to the mid 265 systolic range, 1st set of cardiac enzymes negative, EKG unremarkable, low risk heart score, anticipatory guidance given the patient regarding tobacco sensation, no clinical indication for further workup at this time  Patient stable at discharge  Portions of the record may have been created with voice recognition software  Occasional wrong word or "sound a like" substitutions may have occurred due to the inherent limitations of voice recognition software  Read the chart carefully and recognize, using context, where substitutions have occurred  Counseling: I had a detailed discussion with the patient and/or guardian regarding: the historical points, exam findings, and any diagnostic results supporting the discharge diagnosis, lab results, radiology results, discharge instructions reviewed with patient and/or family/caregiver and understanding was verbalized   Instructions given to return to the emergency department if symptoms worsen or persist, or if there are any questions or concerns that arise at home      This patient was examined during the Covid-19 pandemic, and appropriate PPE was employed as defined by OSHA to minimize exposure to the patient and to avoid spread in the event that I am an asymptomatic carrier  All efforts were made to avoid direct contact with the patient per CDC guidelines ("social distancing") unless otherwise necessary to rule out a medical emergency and/or to provide life-saving interventions  Donning and doffing of PPE was performed per recommended guidelines, and personal PPE was employed if /when institutional PPE was not readily available or was deemed to be less than the recommended as defined by OSHA  Amount and/or Complexity of Data Reviewed  Clinical lab tests: ordered and reviewed  Tests in the radiology section of CPT®: ordered and reviewed  Tests in the medicine section of CPT®: ordered and reviewed  Decide to obtain previous medical records or to obtain history from someone other than the patient: yes  Review and summarize past medical records: yes  Independent visualization of images, tracings, or specimens: yes        Disposition  Final diagnoses:   HTN (hypertension)   Atypical chest pain   Tobacco abuse     Time reflects when diagnosis was documented in both MDM as applicable and the Disposition within this note     Time User Action Codes Description Comment    11/7/2022  8:24 AM J Luis Ode Add [I10] HTN (hypertension)     11/7/2022  8:24 AM J Luis Ode Add [R07 89] Atypical chest pain     11/7/2022  8:27 AM J Luis Ode Add [Z72 0] Tobacco abuse       ED Disposition     ED Disposition   Discharge    Condition   Stable    Date/Time   Mon Nov 7, 2022  8:24 AM    Comment   [address] discharge to home/self care                 Follow-up Information     Follow up With Specialties Details Why [address], [address] [place] [address], Nurse Practitioner, Emergency Medicine   [address] [facility] [address]  [phone]            Discharge Medication List as of 11/7/2022  8:25 AM      CONTINUE these medications which have NOT CHANGED    Details   amLODIPine (NORVASC) 5 mg tablet Take 1 tablet (5 mg total) by mouth daily, Starting Fri 10/28/2022, Normal      acetaminophen (TYLENOL) 500 mg tablet Take 2 tablets (1,000 mg total) by mouth 3 (three) times a day, Starting Tue 5/31/2022, Normal      atorvastatin (LIPITOR) 10 mg tablet Take 1 tablet (10 mg total) by mouth daily at bedtime, Starting Mon 2/21/2022, Normal      buPROPion (Wellbutrin SR) 100 mg 12 hr tablet Take 1 tablet (100 mg total) by mouth 2 (two) times a day, Starting Mon 3/14/2022, Normal      colchicine (COLCRYS) 0 6 mg tablet 1 2mg PO x 1 then 0 6mg PO 1 hour later x 1  Total of 3 pills per attack  May not repeat for 3 days, Normal      escitalopram (LEXAPRO) 5 mg tablet Take 1 tablet (5 mg total) by mouth daily, Starting Fri 10/21/2022, Normal      glucose blood test strip Use as instructed, Normal      glucose monitoring kit (FREESTYLE) monitoring kit Use 1 each 2 (two) times a day before meals, Starting Thu 7/8/2021, Normal      !! Lancets (freestyle) lancets Use as instructed, Normal      !! Microlet Lancets MISC Use 2 (two) times a day, Starting Thu 7/8/2021, Normal      ondansetron (ZOFRAN) 4 mg tablet Take 1 tablet (4 mg total) by mouth every 8 (eight) hours as needed for nausea or vomiting, Starting Tue 10/18/2022, Normal      varenicline (CHANTIX SUSANA) 0 5 MG X 11 & 1 MG X 42 tablet Take one 0 5 mg tablet by mouth once daily for 3 days, then one 0 5 mg tablet by mouth twice daily for 4 days, then one 1 mg tablet by mouth twice daily  , Normal       !! - Potential duplicate medications found  Please discuss with provider  No discharge procedures on file      PDMP Review     None          ED Provider  Electronically Signed by           Gary Johnson III, DO  11/07/22 8458

## 2022-11-07 NOTE — TELEPHONE ENCOUNTER
Patient states he does not feel a visit before December is necessary  He will keep an eye on his pressures and let us know  He is worried his BP cuff is malfunctioning  He declined a nurse visit, and will follow up with us in December with [facility]  I advised if he feels off or not well to please call the office

## 2022-11-18 ENCOUNTER — CLINICAL SUPPORT (OUTPATIENT)
Dept: FAMILY MEDICINE CLINIC | Facility: CLINIC | Age: 62
End: 2022-11-18

## 2022-11-18 ENCOUNTER — TELEPHONE (OUTPATIENT)
Dept: FAMILY MEDICINE CLINIC | Facility: CLINIC | Age: 62
End: 2022-11-18

## 2022-11-18 VITALS — HEART RATE: 84 BPM | DIASTOLIC BLOOD PRESSURE: 84 MMHG | SYSTOLIC BLOOD PRESSURE: 140 MMHG | OXYGEN SATURATION: 99 %

## 2022-11-18 DIAGNOSIS — I10 ESSENTIAL HYPERTENSION: Primary | ICD-10-CM

## 2022-11-18 NOTE — TELEPHONE ENCOUNTER
Patient came in the office for BP check this morning  Reports he is feeling okay  Did take amlodipine this morning  BP was 140/84  Pulse 84, O2 99

## 2022-11-28 DIAGNOSIS — E11.9 CONTROLLED TYPE 2 DIABETES MELLITUS WITHOUT COMPLICATION, WITHOUT LONG-TERM CURRENT USE OF INSULIN (HCC): ICD-10-CM

## 2022-11-28 NOTE — TELEPHONE ENCOUNTER
Patient requesting refill(s) of: Contour Next Glucose test strip    Last filled: 7/8/2021 #100 x 3  Last appt: 10/18/2022  Next appt: 12/1/2022  Pharmacy: [facility]

## 2022-12-01 ENCOUNTER — OFFICE VISIT (OUTPATIENT)
Dept: FAMILY MEDICINE CLINIC | Facility: CLINIC | Age: 62
End: 2022-12-01

## 2022-12-01 VITALS
TEMPERATURE: 98.4 F | DIASTOLIC BLOOD PRESSURE: 82 MMHG | HEART RATE: 96 BPM | OXYGEN SATURATION: 99 % | RESPIRATION RATE: 20 BRPM | WEIGHT: 224 LBS | SYSTOLIC BLOOD PRESSURE: 156 MMHG | BODY MASS INDEX: 32.07 KG/M2 | HEIGHT: 70 IN

## 2022-12-01 DIAGNOSIS — I10 ESSENTIAL HYPERTENSION: Primary | ICD-10-CM

## 2022-12-01 DIAGNOSIS — I10 UNCONTROLLED HYPERTENSION: ICD-10-CM

## 2022-12-01 DIAGNOSIS — R20.0 NUMBNESS: ICD-10-CM

## 2022-12-01 DIAGNOSIS — Z72.0 TOBACCO ABUSE: ICD-10-CM

## 2022-12-01 RX ORDER — VALSARTAN 160 MG/1
160 TABLET ORAL DAILY
Qty: 30 TABLET | Refills: 0 | Status: SHIPPED | OUTPATIENT
Start: 2022-12-01

## 2022-12-01 RX ORDER — GABAPENTIN 100 MG/1
100 CAPSULE ORAL
Qty: 30 CAPSULE | Refills: 0 | Status: SHIPPED | OUTPATIENT
Start: 2022-12-01

## 2022-12-01 NOTE — PROGRESS NOTES
[address] [place] [address] Primary Care        NAME: Nicole Mcginnis is a 65 y o  male  : 1960    MRN: [id number]  DATE: 2022  TIME: 9:12 AM    Assessment and Plan   Essential hypertension [I10]  1  Essential hypertension        2  Uncontrolled hypertension  valsartan (DIOVAN) 160 mg tablet      3  Numbness  gabapentin (Neurontin) 100 mg capsule      4  Tobacco abuse              Patient Instructions     Patient Instructions   Start gabapentin at night time for numbness  Start valsartan in the morning for blood pressure  Return in 2 weeks for a follow up / medication and blood pressure check  Consider quitting smoking  Call with any problems          Chief Complaint     Chief Complaint   Patient presents with   • Follow-up         History of Present Illness       Uncontrolled hypertension - Stopped taking medication because they make him feel worse  He thinks he "just haven't found the right medication"  He has a friend on Valsartan, asking about this, his friend tried about 20 different kinds and the Valsartan has the least amount of side effects  He said he gets chest pain when he takes his blood pressure medication, he went to the ER for this chest pain, full workup was negative  Reports taking 2 Norvasc yesterday and 2 Norvasc the day before because his BP was in the 190s  If Valsartan in not successful, we will consider referral to Cardiology    Pt reports still smoking  Has been having headaches and numbness in his head, has been ongoing for years  The numbness comes and goes, sometimes for a few nights, then goes away, then comes back  He is very sensitive to medication side effects, we discussed different options for numbness and how they could make him feel, he still would like to try      Pt got agitated when attempting to review his medication list, stated "I told you I don't take anything, erase it all" - removed all medications form patient list including lancets, glucometer etc at his direction      Review of Systems   Review of Systems   Constitutional: Negative for activity change, diaphoresis, fatigue and fever  HENT: Negative for congestion, facial swelling, hearing loss, rhinorrhea, sinus pressure, sinus pain, sneezing, sore throat and voice change  Eyes: Negative for discharge and visual disturbance  Respiratory: Negative for cough, choking, chest tightness, shortness of breath, wheezing and stridor  Cardiovascular: Positive for chest pain (only when taking Norvasc)  Negative for palpitations and leg swelling  Gastrointestinal: Negative for abdominal distention, abdominal pain, constipation, diarrhea, nausea and vomiting  Endocrine: Negative for polydipsia, polyphagia and polyuria  Genitourinary: Negative for difficulty urinating, dysuria, frequency and urgency  Musculoskeletal: Negative for arthralgias, back pain, gait problem, joint swelling, myalgias, neck pain and neck stiffness  Skin: Negative for color change, rash and wound  Neurological: Positive for numbness (per HPI) and headaches  Negative for dizziness, syncope, speech difficulty, weakness and light-headedness  Hematological: Negative for adenopathy  Does not bruise/bleed easily  Psychiatric/Behavioral: Negative for agitation, behavioral problems, confusion, hallucinations, sleep disturbance and suicidal ideas  The patient is not nervous/anxious            Current Medications       Current Outpatient Medications:   •  gabapentin (Neurontin) 100 mg capsule, Take 1 capsule (100 mg total) by mouth daily at bedtime, Disp: 30 capsule, Rfl: 0  •  valsartan (DIOVAN) 160 mg tablet, Take 1 tablet (160 mg total) by mouth daily, Disp: 30 tablet, Rfl: 0    Current Allergies     Allergies as of 12/01/2022   • (No Known Allergies)            The following portions of the patient's history were reviewed and updated as appropriate: allergies, current medications, past family history, past medical history, past social history, past surgical history and problem list      Past Medical History:   Diagnosis Date   • Gout    • Hypertension    • Osteoarthritis        History reviewed  No pertinent surgical history  Family History   Problem Relation Age of Onset   • Arthritis Mother    • Coronary artery disease Father          Medications have been verified  Objective   /82   Pulse 96   Temp 98 4 °F (36 9 °C) (Tympanic)   Resp 20   Ht 5' 10" (1 778 m)   Wt 102 kg (224 lb)   SpO2 99%   BMI 32 14 kg/m²        Physical Exam     Physical Exam  Vitals and nursing note reviewed  Constitutional:       General: He is not in acute distress  Appearance: He is well-developed and well-nourished  He is not diaphoretic  Neck:      Thyroid: No thyromegaly  Trachea: No tracheal deviation  Cardiovascular:      Rate and Rhythm: Normal rate and regular rhythm  Heart sounds: Normal heart sounds  No murmur heard  Pulmonary:      Effort: Pulmonary effort is normal  No respiratory distress  Breath sounds: Wheezing (continues to smoke) present  Musculoskeletal:         General: No tenderness, deformity or edema  Normal range of motion  Cervical back: Normal range of motion and neck supple  Skin:     General: Skin is warm and dry  Neurological:      Mental Status: He is alert and oriented to person, place, and time  Psychiatric:         Mood and Affect: Mood and affect normal          Speech: Speech normal          Behavior: Behavior is agitated and aggressive  Thought Content:  Thought content normal          Cognition and Memory: Cognition and memory normal          Judgment: Judgment normal

## 2022-12-01 NOTE — PATIENT INSTRUCTIONS
Start gabapentin at night time for numbness  Start valsartan in the morning for blood pressure  Return in 2 weeks for a follow up / medication and blood pressure check  Consider quitting smoking  Call with any problems

## 2022-12-02 ENCOUNTER — TELEPHONE (OUTPATIENT)
Dept: FAMILY MEDICINE CLINIC | Facility: CLINIC | Age: 62
End: 2022-12-02

## 2022-12-02 NOTE — TELEPHONE ENCOUNTER
Patient want to try something to quit smoking again and what ever PCP  feels is best for him, please send to [address]

## 2022-12-05 NOTE — TELEPHONE ENCOUNTER
He has tried Chantix and Wellbutrin- these are the only 2 medication options and he did not like either one because of how they made him feel

## 2022-12-14 LAB
LEFT EYE DIABETIC RETINOPATHY: NORMAL
RIGHT EYE DIABETIC RETINOPATHY: NORMAL

## 2022-12-19 ENCOUNTER — OFFICE VISIT (OUTPATIENT)
Dept: FAMILY MEDICINE CLINIC | Facility: CLINIC | Age: 62
End: 2022-12-19

## 2022-12-19 VITALS
TEMPERATURE: 98 F | OXYGEN SATURATION: 99 % | HEIGHT: 70 IN | SYSTOLIC BLOOD PRESSURE: 136 MMHG | WEIGHT: 227 LBS | HEART RATE: 88 BPM | BODY MASS INDEX: 32.5 KG/M2 | RESPIRATION RATE: 20 BRPM | DIASTOLIC BLOOD PRESSURE: 80 MMHG

## 2022-12-19 DIAGNOSIS — R20.0 NUMBNESS: ICD-10-CM

## 2022-12-19 DIAGNOSIS — I10 ESSENTIAL HYPERTENSION: Primary | ICD-10-CM

## 2022-12-19 RX ORDER — GABAPENTIN 100 MG/1
100 CAPSULE ORAL 2 TIMES DAILY
Qty: 60 CAPSULE | Refills: 1 | Status: SHIPPED | OUTPATIENT
Start: 2022-12-19

## 2022-12-19 NOTE — PATIENT INSTRUCTIONS
Continue Irbesartan daily as directed  Gabapentin can be increased to twice daily if needed for numbness  Return in 3 months or call sooner for problems/concerns

## 2022-12-19 NOTE — PROGRESS NOTES
[address] [place] [address] Primary Care        NAME: Kristina Mayo is a 65 y o  male  : 1960    MRN: [id number]  DATE: 2022  TIME: 10:48 AM    Assessment and Plan   Essential hypertension [I10]  1  Essential hypertension        2  Numbness  gabapentin (Neurontin) 100 mg capsule            Patient Instructions     Patient Instructions   Continue Irbesartan daily as directed  Gabapentin can be increased to twice daily if needed for numbness  Return in 3 months or call sooner for problems/concerns          Chief Complaint     Chief Complaint   Patient presents with   • Follow-up         History of Present Illness       Here for rivera-   Is taking Gabapentin 100mg at bedtime- reports the numbness in his head has resolved- he will occasionally get numbness during the day- 2 tylenol usually resolves this  Irbesartan 160mg daily- as of now he has had no side effects from this       Review of Systems   Review of Systems   Constitutional: Negative for activity change, diaphoresis, fatigue and fever  HENT: Negative for congestion, facial swelling, hearing loss, rhinorrhea, sinus pressure, sinus pain, sneezing, sore throat and voice change  Eyes: Negative for discharge and visual disturbance  Respiratory: Negative for cough, choking, chest tightness, shortness of breath, wheezing and stridor  Cardiovascular: Negative for chest pain, palpitations and leg swelling  Gastrointestinal: Negative for abdominal distention, abdominal pain, constipation, diarrhea, nausea and vomiting  Endocrine: Negative for polydipsia, polyphagia and polyuria  Genitourinary: Negative for difficulty urinating, dysuria, frequency and urgency  Musculoskeletal: Negative for arthralgias, back pain, gait problem, joint swelling, myalgias, neck pain and neck stiffness  Skin: Negative for color change, rash and wound  Neurological: Positive for numbness and headaches   Negative for dizziness, syncope, speech difficulty, weakness and light-headedness  Hematological: Negative for adenopathy  Does not bruise/bleed easily  Psychiatric/Behavioral: Negative for agitation, behavioral problems, confusion, hallucinations, sleep disturbance and suicidal ideas  The patient is not nervous/anxious  Current Medications       Current Outpatient Medications:   •  gabapentin (Neurontin) 100 mg capsule, Take 1 capsule (100 mg total) by mouth 2 (two) times a day, Disp: 60 capsule, Rfl: 1  •  valsartan (DIOVAN) 160 mg tablet, Take 1 tablet (160 mg total) by mouth daily, Disp: 30 tablet, Rfl: 0    Current Allergies     Allergies as of 12/19/2022   • (No Known Allergies)            The following portions of the patient's history were reviewed and updated as appropriate: allergies, current medications, past family history, past medical history, past social history, past surgical history and problem list      Past Medical History:   Diagnosis Date   • Gout    • Hypertension    • Osteoarthritis        History reviewed  No pertinent surgical history  Family History   Problem Relation Age of Onset   • Arthritis Mother    • Coronary artery disease Father          Medications have been verified  Objective   /80   Pulse 88   Temp 98 °F (36 7 °C) (Tympanic)   Resp 20   Ht 5' 10" (1 778 m)   Wt 103 kg (227 lb)   SpO2 99%   BMI 32 57 kg/m²        Physical Exam     Physical Exam  Vitals and nursing note reviewed  Constitutional:       General: He is not in acute distress  Appearance: He is well-developed  He is not diaphoretic  Neck:      Thyroid: No thyromegaly  Trachea: No tracheal deviation  Cardiovascular:      Rate and Rhythm: Normal rate and regular rhythm  Heart sounds: Normal heart sounds  No murmur heard  Pulmonary:      Effort: Pulmonary effort is normal  No respiratory distress  Breath sounds: Normal breath sounds  No wheezing  Musculoskeletal:         General: No tenderness or deformity  Normal range of motion  Cervical back: Normal range of motion and neck supple  Skin:     General: Skin is warm and dry  Neurological:      Mental Status: He is alert and oriented to person, place, and time  Psychiatric:         Mood and Affect: Mood normal          Speech: Speech normal          Behavior: Behavior normal          Thought Content:  Thought content normal          Judgment: Judgment normal

## 2022-12-27 DIAGNOSIS — E11.9 CONTROLLED TYPE 2 DIABETES MELLITUS WITHOUT COMPLICATION, WITHOUT LONG-TERM CURRENT USE OF INSULIN (HCC): Primary | ICD-10-CM

## 2022-12-27 DIAGNOSIS — I10 UNCONTROLLED HYPERTENSION: ICD-10-CM

## 2022-12-27 RX ORDER — VALSARTAN 160 MG/1
160 TABLET ORAL DAILY
Qty: 30 TABLET | Refills: 5 | Status: SHIPPED | OUTPATIENT
Start: 2022-12-27

## 2022-12-27 NOTE — TELEPHONE ENCOUNTER
Patient requesting refill(s) of: Valsartan 160mg    Last filled: 12/1/22 #30x0  Last appt: 12/19/22   Next appt: 3/21/23  Pharmacy: [facility]      Test strips were discontinued - is patient to still be checking his sugar? Patient also asking to restart wellbutrin to help quit smoking  Please advise

## 2022-12-27 NOTE — TELEPHONE ENCOUNTER
Patient called asking 3 questions   1) Needs refills on his Valsartan 160mg, takes 1 tab daily by mouth #90 day supply  2) Needs his test strips--Lancets  3 was asking if he could start taking the Burcupopine HCL 100mg to stop smoking again , for he has some yet     Any questions please call patient at [phone]

## 2022-12-27 NOTE — TELEPHONE ENCOUNTER
Test strips can be reordered  At his visit he got angry that I was asking him each item on his medlist and yelled "I SAID I'M NOT TAKING ANYTHING!" So everything at that time was deleted  Wellbutrin made him feel sick  I don't recommend this for him  Does he want to try nicotine patches?

## 2022-12-27 NOTE — TELEPHONE ENCOUNTER
Strips sent for approval  Patient also has a bunch of wellbutrin, he would like to re-try it  He thinks he may have been getting sick from something else

## 2023-01-03 ENCOUNTER — TELEPHONE (OUTPATIENT)
Dept: FAMILY MEDICINE CLINIC | Facility: CLINIC | Age: 63
End: 2023-01-03

## 2023-01-03 DIAGNOSIS — E11.9 CONTROLLED TYPE 2 DIABETES MELLITUS WITHOUT COMPLICATION, WITHOUT LONG-TERM CURRENT USE OF INSULIN (HCC): Primary | ICD-10-CM

## 2023-01-03 NOTE — TELEPHONE ENCOUNTER
Received call from Ed at [facility] requesting script for EZ Touch 30 G lancets   Did not see on med list

## 2023-01-11 ENCOUNTER — TELEPHONE (OUTPATIENT)
Dept: FAMILY MEDICINE CLINIC | Facility: CLINIC | Age: 63
End: 2023-01-11

## 2023-01-11 NOTE — TELEPHONE ENCOUNTER
Patient called not feelling well and when he took his blood pressure it was 170/102 would like someone to call him [phone]

## 2023-01-11 NOTE — TELEPHONE ENCOUNTER
Spoke with patient  He is worried about his BP being high again  He said he is getting the "Head numbness" like he has on and off the last 3 years  Also having some numbness in legs

## 2023-01-12 ENCOUNTER — OFFICE VISIT (OUTPATIENT)
Dept: FAMILY MEDICINE CLINIC | Facility: CLINIC | Age: 63
End: 2023-01-12

## 2023-01-12 VITALS
HEART RATE: 88 BPM | OXYGEN SATURATION: 99 % | SYSTOLIC BLOOD PRESSURE: 162 MMHG | DIASTOLIC BLOOD PRESSURE: 88 MMHG | BODY MASS INDEX: 32.64 KG/M2 | TEMPERATURE: 98 F | HEIGHT: 70 IN | RESPIRATION RATE: 20 BRPM | WEIGHT: 228 LBS

## 2023-01-12 DIAGNOSIS — R20.0 NUMBNESS: ICD-10-CM

## 2023-01-12 DIAGNOSIS — E78.00 HYPERCHOLESTEREMIA: ICD-10-CM

## 2023-01-12 DIAGNOSIS — E11.9 CONTROLLED TYPE 2 DIABETES MELLITUS WITHOUT COMPLICATION, WITHOUT LONG-TERM CURRENT USE OF INSULIN (HCC): ICD-10-CM

## 2023-01-12 DIAGNOSIS — I10 ESSENTIAL HYPERTENSION: Primary | ICD-10-CM

## 2023-01-12 RX ORDER — VALSARTAN 160 MG/1
160 TABLET ORAL 2 TIMES DAILY
Qty: 180 TABLET | Refills: 1 | Status: SHIPPED | OUTPATIENT
Start: 2023-01-12

## 2023-01-12 NOTE — PROGRESS NOTES
[address] [place] [address] Primary Care        NAME: Mary Caraballo is a 65 y o  male  : 1960    MRN: [id number]  DATE: 2023  TIME: 9:35 AM    Assessment and Plan   Essential hypertension [I10]  1  Essential hypertension  valsartan (DIOVAN) 160 mg tablet    Comprehensive metabolic panel    CBC and differential      2  Numbness        3  Controlled type 2 diabetes mellitus without complication, without long-term current use of insulin (HCC)  Hemoglobin A1C    Microalbumin / creatinine urine ratio      4  Hypercholesteremia  Lipid Panel with Direct LDL reflex            Patient Instructions     Patient Instructions     Increase Valsartan to 160mg twice daily  Restart Gabapentin 100mg twice daily  Keep appointment in March, call sooner if any problems/concerns  Discussed that his symptoms could also be anxiety related- will discuss further at next visit if this continues  Type 2 Diabetes Management for Adults   AMBULATORY CARE:   Type 2 diabetes  is a disease that affects how your body uses glucose (sugar)  Either your body cannot make enough insulin, or it cannot use the insulin correctly  It is important to keep diabetes controlled to prevent damage to your heart, blood vessels, and other organs  Have someone call your local emergency number (911 in the [address] [facility]) if:   · You cannot be woken  · You have signs of diabetic ketoacidosis:     ? confusion, fatigue    ? vomiting    ? rapid heartbeat    ? fruity smelling breath    ? extreme thirst    ? dry mouth and skin    · You have any of the following signs of a heart attack:      ? Squeezing, pressure, or pain in your chest    ? You may  also have any of the following:     § Discomfort or pain in your back, neck, jaw, stomach, or arm    § Shortness of breath    § Nausea or vomiting    § Lightheadedness or a sudden cold sweat    · You have any of the following signs of a stroke:      ? Numbness or drooping on one side of your face     ?  Weakness in an arm or leg    ? Confusion or difficulty speaking    ? Dizziness, a severe headache, or vision loss    Call your doctor or diabetes care team if:   · You have a sore or wound that will not heal     · You have a change in the amount you urinate  · Your blood sugar levels are higher than your target goals  · You often have lower blood sugar levels than your target goals  · Your skin is red, dry, warm, or swollen  · You have trouble coping with diabetes, or you feel anxious or depressed  · You have questions or concerns about your condition or care  What you need to know about high blood sugar levels:  High blood sugar levels may not cause any symptoms  You may feel more thirsty or urinate more often than usual  Over time, high blood sugar levels can damage your nerves, blood vessels, tissues, and organs  The following can increase your blood sugar levels:  · Large meals or large amounts of carbohydrates at one time    · Less physical activity    · Stress    · Illness    · A lower dose of medicine or insulin, or a late dose    What you need to know about low blood sugar levels: You can prevent symptoms such as shakiness, dizziness, irritability, or confusion by preventing your blood sugar levels from going too low  · Treat a low blood sugar level right away  ? Drink 4 ounces of juice or have 1 tube of glucose gel  ? Check your blood sugar level again 10 to 15 minutes later  ? When the level goes back to normal, eat a meal or snack to prevent another decrease  · Keep glucose gel, raisins, or hard candy with you at all times to treat a low blood sugar level  · Your blood sugar level can get too low if you take diabetes medicine or insulin and do not eat enough food  · If you use insulin, check your blood sugar level before you exercise  ? If your blood sugar level is below 100 mg/dL, eat 4 crackers or 2 ounces of raisins, or drink 4 ounces of juice  ?  Check your level every 30 minutes if you exercise more than 1 hour  ? You may need a snack during or after exercise  What you can do to manage your blood sugar levels:   · Check your blood sugar levels as directed and as needed  Several items are available to use to check your levels  You may need to check by testing a drop of blood in a glucose monitor  You may instead be given a continuous glucose monitoring (CGM) device  The device is worn at all times  The CGM checks your blood sugar level every 5 minutes  It sends results to an electronic device such as a smart phone  A CGM can be used with or without an insulin pump  Talk with your provider to find out which method is best for you  The goal for blood sugar levels before meals  is between 80 and 130 mg/dL and 2 hours after eating  is lower than 180 mg/dL  · Make healthy food choices  Work with a dietitian to develop a meal plan that works for you and your schedule  A dietitian can help you learn how to eat the right amount of carbohydrates during your meals and snacks  Carbohydrates can raise your blood sugar level if you eat too many at one time  Examples of foods that contain carbohydrates are breads, cereals, rice, pasta, and sweets  · Get regular physical activity  Physical activity can help you get to your target blood sugar level goal and manage your weight  Get at least 150 minutes of moderate to vigorous aerobic physical activity each week  Do not miss more than 2 days in a row  Do not sit longer than 30 minutes at a time  Your healthcare provider can help you create an activity plan  The plan can include the best activities for you and can help you build your strength and endurance  · Maintain a healthy weight  Ask your healthcare provider what a healthy weight is for you  Ask him or her to help you create a safe weight loss plan if you are overweight  · Take your diabetes medicine or insulin as directed    You may need diabetes medicine, insulin, or both to help control your blood sugar levels  Your healthcare provider will teach you how and when to take your diabetes medicine or insulin  You will also be taught about side effects oral diabetes medicine can cause  Insulin may be injected, or given through a pump or pen  You and your care team will discuss which method is best for you  ? An insulin pump  is an implanted device that gives your insulin 24 hours a day  An insulin pump prevents the need for multiple insulin injections in a day  ? An insulin pen  is a device prefilled with the right amount of insulin  ? You and your family members will be taught how to draw up and give insulin  if this is the best method for you  Your education team will also teach you how to dispose of needles and syringes  ? You will learn how much insulin you need  and when to give it  You will be taught when not to give insulin  You will also be taught what to do if your blood sugar level drops too low  This may happen if you take insulin and do not eat the right amount of carbohydrates  Other things you can do to manage type 2 diabetes:   · Wear medical alert identification  Wear medical alert jewelry or carry a card that says you have diabetes  Ask your provider where to get these items  · Do not smoke  Nicotine and other chemicals in cigarettes and cigars can cause lung and blood vessel damage  It also makes it more difficult to manage your diabetes  Ask your provider for information if you currently smoke and need help to quit  Do not use e-cigarettes or smokeless tobacco in place of cigarettes or to help you quit  They still contain nicotine  · Check your feet each day for cuts, scratches, calluses, or other wounds  Look for redness and swelling, and feel for warmth  Wear shoes that fit well  Check your shoes for rocks or other objects that can hurt your feet  Do not walk barefoot or wear shoes without socks   Wear cotton socks to help keep your feet dry  · Ask about vaccines you may need  You have a higher risk for serious illness if you get the flu, pneumonia, COVID-19, or hepatitis  Ask your provider if you should get vaccines to prevent these or other diseases, and when to get the vaccines  · Talk to your care team if you become stressed about diabetes care  Sometimes being able to fit diabetes care into your life can cause increased stress  The stress can cause you not to take care of yourself properly  Your care team can help by offering tips about self-care  Your care team may suggest you talk to a mental health provider  The provider can listen and offer help with self-care issues  Follow up with your doctor or diabetes care team as directed: You may need to have blood tests done before your follow-up visit  The test results will show if changes need to be made in your treatment or self-care  Write down your questions so you remember to ask them during your visits  Talk to your provider if you cannot afford your medicine  © Copyright [employer] 2022 Information is for End User's use only and may not be sold, redistributed or otherwise used for commercial purposes  All illustrations and images included in CareNotes® are the copyrighted property of A D A M , Inc  or [facility]   The above information is an  only  It is not intended as medical advice for individual conditions or treatments  Talk to your doctor, nurse or pharmacist before following any medical regimen to see if it is safe and effective for you  Chief Complaint     Chief Complaint   Patient presents with   • Hypertension         History of Present Illness       Here for high blood pressure and numbness feeling in his body  He has had this multiple times  In the past he reports he thought it was caused by his medications so he is consistently stopping and starting medications   He recently stopped his Gabapentin, which had helped with his numbness previously  His BP is elevated, will increase his Valsartan from 160mg once daily to twice daily  He is agreeable to restart his Gabapentin  Review of Systems   Review of Systems   Constitutional: Negative for activity change, diaphoresis, fatigue and fever  HENT: Negative for congestion, facial swelling, hearing loss, rhinorrhea, sinus pressure, sinus pain, sneezing, sore throat and voice change  Eyes: Negative for discharge and visual disturbance  Respiratory: Negative for cough, choking, chest tightness, shortness of breath, wheezing and stridor  Cardiovascular: Negative for chest pain, palpitations and leg swelling  Gastrointestinal: Negative for abdominal distention, abdominal pain, constipation, diarrhea, nausea and vomiting  Endocrine: Negative for polydipsia, polyphagia and polyuria  Genitourinary: Negative for difficulty urinating, dysuria, frequency and urgency  Musculoskeletal: Positive for arthralgias and myalgias  Negative for back pain, gait problem, joint swelling, neck pain and neck stiffness  Skin: Negative for color change, rash and wound  Neurological: Positive for numbness  Negative for dizziness, syncope, speech difficulty, weakness, light-headedness and headaches  Hematological: Negative for adenopathy  Does not bruise/bleed easily  Psychiatric/Behavioral: Negative for agitation, behavioral problems, confusion, hallucinations, sleep disturbance and suicidal ideas  The patient is nervous/anxious            Current Medications       Current Outpatient Medications:   •  valsartan (DIOVAN) 160 mg tablet, Take 1 tablet (160 mg total) by mouth 2 (two) times a day, Disp: 180 tablet, Rfl: 1  •  gabapentin (Neurontin) 100 mg capsule, Take 1 capsule (100 mg total) by mouth 2 (two) times a day (Patient not taking: Reported on 1/12/2023), Disp: 60 capsule, Rfl: 1    Current Allergies     Allergies as of 01/12/2023   • (No Known Allergies)            The following portions of the patient's history were reviewed and updated as appropriate: allergies, current medications, past family history, past medical history, past social history, past surgical history and problem list      Past Medical History:   Diagnosis Date   • Gout    • Hypertension    • Osteoarthritis        History reviewed  No pertinent surgical history  Family History   Problem Relation Age of Onset   • Arthritis Mother    • Coronary artery disease Father          Medications have been verified  Objective   /88   Pulse 88   Temp 98 °F (36 7 °C) (Tympanic)   Resp 20   Ht 5' 10" (1 778 m)   Wt 103 kg (228 lb)   SpO2 99%   BMI 32 71 kg/m²        Physical Exam     Physical Exam  Vitals and nursing note reviewed  Constitutional:       General: He is not in acute distress  Appearance: He is well-developed  He is not diaphoretic  Neck:      Thyroid: No thyromegaly  Trachea: No tracheal deviation  Cardiovascular:      Rate and Rhythm: Normal rate and regular rhythm  Heart sounds: Normal heart sounds  No murmur heard  Pulmonary:      Effort: Pulmonary effort is normal  No respiratory distress  Breath sounds: Normal breath sounds  No wheezing  Musculoskeletal:         General: No tenderness or deformity  Normal range of motion  Cervical back: Normal range of motion and neck supple  Skin:     General: Skin is warm and dry  Neurological:      Mental Status: He is alert and oriented to person, place, and time  Psychiatric:         Attention and Perception: Attention normal          Mood and Affect: Mood is anxious  Speech: Speech normal          Behavior: Behavior normal          Thought Content:  Thought content normal          Cognition and Memory: Cognition and memory normal          Judgment: Judgment normal

## 2023-01-12 NOTE — PATIENT INSTRUCTIONS
Increase Valsartan to 160mg twice daily  Restart Gabapentin 100mg twice daily  Keep appointment in March, call sooner if any problems/concerns  Discussed that his symptoms could also be anxiety related- will discuss further at next visit if this continues  Type 2 Diabetes Management for Adults   AMBULATORY CARE:   Type 2 diabetes  is a disease that affects how your body uses glucose (sugar)  Either your body cannot make enough insulin, or it cannot use the insulin correctly  It is important to keep diabetes controlled to prevent damage to your heart, blood vessels, and other organs  Have someone call your local emergency number (911 in the [address]) if:   · You cannot be woken  · You have signs of diabetic ketoacidosis:     ? confusion, fatigue    ? vomiting    ? rapid heartbeat    ? fruity smelling breath    ? extreme thirst    ? dry mouth and skin    · You have any of the following signs of a heart attack:      ? Squeezing, pressure, or pain in your chest    ? You may  also have any of the following:     § Discomfort or pain in your back, neck, jaw, stomach, or arm    § Shortness of breath    § Nausea or vomiting    § Lightheadedness or a sudden cold sweat    · You have any of the following signs of a stroke:      ? Numbness or drooping on one side of your face     ? Weakness in an arm or leg    ? Confusion or difficulty speaking    ? Dizziness, a severe headache, or vision loss    Call your doctor or diabetes care team if:   · You have a sore or wound that will not heal     · You have a change in the amount you urinate  · Your blood sugar levels are higher than your target goals  · You often have lower blood sugar levels than your target goals  · Your skin is red, dry, warm, or swollen  · You have trouble coping with diabetes, or you feel anxious or depressed  · You have questions or concerns about your condition or care      What you need to know about high blood sugar levels:  High blood sugar levels may not cause any symptoms  You may feel more thirsty or urinate more often than usual  Over time, high blood sugar levels can damage your nerves, blood vessels, tissues, and organs  The following can increase your blood sugar levels:  · Large meals or large amounts of carbohydrates at one time    · Less physical activity    · Stress    · Illness    · A lower dose of medicine or insulin, or a late dose    What you need to know about low blood sugar levels: You can prevent symptoms such as shakiness, dizziness, irritability, or confusion by preventing your blood sugar levels from going too low  · Treat a low blood sugar level right away  ? Drink 4 ounces of juice or have 1 tube of glucose gel  ? Check your blood sugar level again 10 to 15 minutes later  ? When the level goes back to normal, eat a meal or snack to prevent another decrease  · Keep glucose gel, raisins, or hard candy with you at all times to treat a low blood sugar level  · Your blood sugar level can get too low if you take diabetes medicine or insulin and do not eat enough food  · If you use insulin, check your blood sugar level before you exercise  ? If your blood sugar level is below 100 mg/dL, eat 4 crackers or 2 ounces of raisins, or drink 4 ounces of juice  ? Check your level every 30 minutes if you exercise more than 1 hour  ? You may need a snack during or after exercise  What you can do to manage your blood sugar levels:   · Check your blood sugar levels as directed and as needed  Several items are available to use to check your levels  You may need to check by testing a drop of blood in a glucose monitor  You may instead be given a continuous glucose monitoring (CGM) device  The device is worn at all times  The CGM checks your blood sugar level every 5 minutes  It sends results to an electronic device such as a smart phone  A CGM can be used with or without an insulin pump   Talk with your provider to find out which method is best for you  The goal for blood sugar levels before meals  is between 80 and 130 mg/dL and 2 hours after eating  is lower than 180 mg/dL  · Make healthy food choices  Work with a dietitian to develop a meal plan that works for you and your schedule  A dietitian can help you learn how to eat the right amount of carbohydrates during your meals and snacks  Carbohydrates can raise your blood sugar level if you eat too many at one time  Examples of foods that contain carbohydrates are breads, cereals, rice, pasta, and sweets  · Get regular physical activity  Physical activity can help you get to your target blood sugar level goal and manage your weight  Get at least 150 minutes of moderate to vigorous aerobic physical activity each week  Do not miss more than 2 days in a row  Do not sit longer than 30 minutes at a time  Your healthcare provider can help you create an activity plan  The plan can include the best activities for you and can help you build your strength and endurance  · Maintain a healthy weight  Ask your healthcare provider what a healthy weight is for you  Ask him or her to help you create a safe weight loss plan if you are overweight  · Take your diabetes medicine or insulin as directed  You may need diabetes medicine, insulin, or both to help control your blood sugar levels  Your healthcare provider will teach you how and when to take your diabetes medicine or insulin  You will also be taught about side effects oral diabetes medicine can cause  Insulin may be injected, or given through a pump or pen  You and your care team will discuss which method is best for you  ? An insulin pump  is an implanted device that gives your insulin 24 hours a day  An insulin pump prevents the need for multiple insulin injections in a day  ? An insulin pen  is a device prefilled with the right amount of insulin           ? You and your family members will be taught how to draw up and give insulin  if this is the best method for you  Your education team will also teach you how to dispose of needles and syringes  ? You will learn how much insulin you need  and when to give it  You will be taught when not to give insulin  You will also be taught what to do if your blood sugar level drops too low  This may happen if you take insulin and do not eat the right amount of carbohydrates  Other things you can do to manage type 2 diabetes:   · Wear medical alert identification  Wear medical alert jewelry or carry a card that says you have diabetes  Ask your provider where to get these items  · Do not smoke  Nicotine and other chemicals in cigarettes and cigars can cause lung and blood vessel damage  It also makes it more difficult to manage your diabetes  Ask your provider for information if you currently smoke and need help to quit  Do not use e-cigarettes or smokeless tobacco in place of cigarettes or to help you quit  They still contain nicotine  · Check your feet each day for cuts, scratches, calluses, or other wounds  Look for redness and swelling, and feel for warmth  Wear shoes that fit well  Check your shoes for rocks or other objects that can hurt your feet  Do not walk barefoot or wear shoes without socks  Wear cotton socks to help keep your feet dry  · Ask about vaccines you may need  You have a higher risk for serious illness if you get the flu, pneumonia, COVID-19, or hepatitis  Ask your provider if you should get vaccines to prevent these or other diseases, and when to get the vaccines  · Talk to your care team if you become stressed about diabetes care  Sometimes being able to fit diabetes care into your life can cause increased stress  The stress can cause you not to take care of yourself properly  Your care team can help by offering tips about self-care   Your care team may suggest you talk to a mental health provider  The provider can listen and offer help with self-care issues  Follow up with your doctor or diabetes care team as directed: You may need to have blood tests done before your follow-up visit  The test results will show if changes need to be made in your treatment or self-care  Write down your questions so you remember to ask them during your visits  Talk to your provider if you cannot afford your medicine  © Copyright [employer] 2022 Information is for End User's use only and may not be sold, redistributed or otherwise used for commercial purposes  All illustrations and images included in CareNotes® are the copyrighted property of [employer]  or [facility]   The above information is an  only  It is not intended as medical advice for individual conditions or treatments  Talk to your doctor, nurse or pharmacist before following any medical regimen to see if it is safe and effective for you

## 2023-01-27 DIAGNOSIS — R20.0 NUMBNESS: ICD-10-CM

## 2023-01-27 DIAGNOSIS — E11.9 CONTROLLED TYPE 2 DIABETES MELLITUS WITHOUT COMPLICATION, WITHOUT LONG-TERM CURRENT USE OF INSULIN (HCC): Primary | ICD-10-CM

## 2023-01-27 RX ORDER — GABAPENTIN 100 MG/1
100 CAPSULE ORAL 2 TIMES DAILY
Qty: 60 CAPSULE | Refills: 1 | Status: SHIPPED | OUTPATIENT
Start: 2023-01-27

## 2023-01-27 NOTE — TELEPHONE ENCOUNTER
Patient requesting refill(s) of:  Gabapentin 100mg  Last filled:12/19/22  Last appt:1/12/23  Next appt:3/21/23  [facility]    Patient also requesting for a fill for his lancets,

## 2023-02-03 ENCOUNTER — OFFICE VISIT (OUTPATIENT)
Dept: FAMILY MEDICINE CLINIC | Facility: CLINIC | Age: 63
End: 2023-02-03

## 2023-02-03 VITALS
WEIGHT: 230 LBS | BODY MASS INDEX: 32.93 KG/M2 | HEIGHT: 70 IN | DIASTOLIC BLOOD PRESSURE: 80 MMHG | RESPIRATION RATE: 20 BRPM | OXYGEN SATURATION: 98 % | TEMPERATURE: 98 F | HEART RATE: 86 BPM | SYSTOLIC BLOOD PRESSURE: 138 MMHG

## 2023-02-03 DIAGNOSIS — E66.09 CLASS 1 OBESITY DUE TO EXCESS CALORIES WITH SERIOUS COMORBIDITY AND BODY MASS INDEX (BMI) OF 31.0 TO 31.9 IN ADULT: ICD-10-CM

## 2023-02-03 DIAGNOSIS — I10 ESSENTIAL HYPERTENSION: ICD-10-CM

## 2023-02-03 DIAGNOSIS — F41.9 ANXIETY: Primary | ICD-10-CM

## 2023-02-03 RX ORDER — BUPROPION HYDROCHLORIDE 100 MG/1
TABLET, EXTENDED RELEASE ORAL
COMMUNITY
Start: 2023-01-25

## 2023-02-03 RX ORDER — HYDROXYZINE HYDROCHLORIDE 25 MG/1
25 TABLET, FILM COATED ORAL EVERY 6 HOURS PRN
Qty: 90 TABLET | Refills: 1 | Status: SHIPPED | OUTPATIENT
Start: 2023-02-03

## 2023-02-03 NOTE — PATIENT INSTRUCTIONS
Start taking Hydroxyzine as needed for anxiety and numbness, continue Wellbutrin as directed  If the numbness does not stop or continues, then stop Wellbutrin     Continue Gabapentin and Valsartan as directed

## 2023-02-03 NOTE — PROGRESS NOTES
[address] [facility]        NAME: Aida Valenzuela is a 65 y o  male  : 1960    MRN: [id number]  DATE: February 3, 2023  TIME: 10:45 AM    Assessment and Plan   Anxiety [F41 9]  1  Anxiety  hydrOXYzine HCL (ATARAX) 25 mg tablet      2  Class 1 obesity due to excess calories with serious comorbidity and body mass index (BMI) of 31 0 to 31 9 in adult        3  Essential hypertension              Patient Instructions     Patient Instructions   Start taking Hydroxyzine as needed for anxiety and numbness, continue Wellbutrin as directed  If the numbness does not stop or continues, then stop Wellbutrin  Continue Gabapentin and Valsartan as directed          Chief Complaint     Chief Complaint   Patient presents with   • Follow-up         History of Present Illness       Started with numbness in his head on , then on/off all week  He has had this numerous times over the past couple of years- he typically thinks that this was related to side effects of medications he was prescribed  Has been on/off numerous medications over the past few years for depression, diabetes, HTN, high cholesterol  He took each one of his pills this morning (Wellbutrin, Gabapentin, and Valsartan) this morning  He is happy he did not smoke any cigarettes on the drive here  Was smoking "a couple packs a day" but now has no idea how many he is still smoking "Way too many" when asked how many  He reports when he takes Tylenol this helps the "numbness"  "The numbness always comes after I've had a few good days"  Reports mouth burns when he has this  Denies weakness in his extremities when he has this numb facial feeling      Review of Systems   Review of Systems   Constitutional: Negative for activity change, diaphoresis, fatigue and fever  HENT: Negative for congestion, facial swelling, hearing loss, rhinorrhea, sinus pressure, sinus pain, sneezing, sore throat and voice change      Eyes: Negative for discharge and visual disturbance  Respiratory: Negative for cough, choking, chest tightness, shortness of breath, wheezing and stridor  Cardiovascular: Negative for chest pain, palpitations and leg swelling  Gastrointestinal: Negative for abdominal distention, abdominal pain, constipation, diarrhea, nausea and vomiting  Endocrine: Negative for polydipsia, polyphagia and polyuria  Genitourinary: Negative for difficulty urinating, dysuria, frequency and urgency  Musculoskeletal: Negative for arthralgias, back pain, gait problem, joint swelling, myalgias, neck pain and neck stiffness  Skin: Negative for color change, rash and wound  Neurological: Positive for numbness  Negative for dizziness, syncope, speech difficulty, weakness, light-headedness and headaches  Hematological: Negative for adenopathy  Does not bruise/bleed easily  Psychiatric/Behavioral: Negative for agitation, behavioral problems, confusion, hallucinations, sleep disturbance and suicidal ideas  The patient is nervous/anxious  Current Medications       Current Outpatient Medications:   •  hydrOXYzine HCL (ATARAX) 25 mg tablet, Take 1 tablet (25 mg total) by mouth every 6 (six) hours as needed for anxiety, Disp: 90 tablet, Rfl: 1  •  buPROPion (WELLBUTRIN SR) 100 mg 12 hr tablet, , Disp: , Rfl:   •  gabapentin (Neurontin) 100 mg capsule, Take 1 capsule (100 mg total) by mouth 2 (two) times a day, Disp: 60 capsule, Rfl: 1  •  valsartan (DIOVAN) 160 mg tablet, Take 1 tablet (160 mg total) by mouth 2 (two) times a day, Disp: 180 tablet, Rfl: 1    Current Allergies     Allergies as of 02/03/2023   • (No Known Allergies)            The following portions of the patient's history were reviewed and updated as appropriate: allergies, current medications, past family history, past medical history, past social history, past surgical history and problem list      Past Medical History:   Diagnosis Date   • Gout        History reviewed   No pertinent surgical history  Family History   Problem Relation Age of Onset   • Arthritis Mother    • Coronary artery disease Father          Medications have been verified  Objective   /80   Pulse 86   Temp 98 °F (36 7 °C) (Tympanic)   Resp 20   Ht 5' 10" (1 778 m)   Wt 104 kg (230 lb)   SpO2 98%   BMI 33 00 kg/m²        Physical Exam     Physical Exam  Vitals and nursing note reviewed  Constitutional:       General: He is not in acute distress  Appearance: He is well-developed  He is not diaphoretic  Eyes:      General:         Right eye: No discharge  Left eye: No discharge  Conjunctiva/sclera: Conjunctivae normal       Pupils: Pupils are equal, round, and reactive to light  Cardiovascular:      Rate and Rhythm: Normal rate and regular rhythm  Heart sounds: Normal heart sounds  No murmur heard  Pulmonary:      Effort: Pulmonary effort is normal  No respiratory distress  Breath sounds: Normal breath sounds  No wheezing  Musculoskeletal:         General: No tenderness or deformity  Normal range of motion  Skin:     General: Skin is warm and dry  Neurological:      Mental Status: He is alert and oriented to person, place, and time  Psychiatric:         Attention and Perception: Attention normal          Mood and Affect: Affect normal  Mood is anxious  Speech: Speech normal          Behavior: Behavior normal          Thought Content: Thought content normal          Cognition and Memory: Cognition and memory normal          Judgment: Judgment normal       Comments: While in the office showed me pictures on his phone of sausage he is making, freezing, etc       BMI Counseling: Body mass index is 33 kg/m²   The BMI is above normal  Nutrition recommendations include decreasing portion sizes, encouraging healthy choices of fruits and vegetables, decreasing fast food intake, consuming healthier snacks, limiting drinks that contain sugar, moderation in carbohydrate intake and increasing intake of lean protein  Exercise recommendations include exercising 3-5 times per week  Rationale for BMI follow-up plan is due to patient being overweight or obese

## 2023-02-27 DIAGNOSIS — Z72.0 TOBACCO ABUSE: Primary | ICD-10-CM

## 2023-02-27 RX ORDER — VARENICLINE TARTRATE 25 MG
KIT ORAL
Qty: 53 EACH | Refills: 0 | Status: SHIPPED | OUTPATIENT
Start: 2023-02-27 | End: 2023-06-04

## 2023-02-27 NOTE — TELEPHONE ENCOUNTER
Patient called today  He is out of his bupropion that he was using for smoking cessation  He is wondering if we can try chantix  Please advise

## 2023-03-21 ENCOUNTER — OFFICE VISIT (OUTPATIENT)
Dept: FAMILY MEDICINE CLINIC | Facility: CLINIC | Age: 63
End: 2023-03-21

## 2023-03-21 VITALS
RESPIRATION RATE: 20 BRPM | TEMPERATURE: 98 F | HEIGHT: 70 IN | BODY MASS INDEX: 33.21 KG/M2 | HEART RATE: 84 BPM | SYSTOLIC BLOOD PRESSURE: 128 MMHG | OXYGEN SATURATION: 99 % | DIASTOLIC BLOOD PRESSURE: 68 MMHG | WEIGHT: 232 LBS

## 2023-03-21 DIAGNOSIS — E66.09 CLASS 1 OBESITY DUE TO EXCESS CALORIES WITH SERIOUS COMORBIDITY AND BODY MASS INDEX (BMI) OF 31.0 TO 31.9 IN ADULT: ICD-10-CM

## 2023-03-21 DIAGNOSIS — I10 ESSENTIAL HYPERTENSION: Primary | ICD-10-CM

## 2023-03-21 DIAGNOSIS — M1A.09X0 CHRONIC GOUT OF MULTIPLE SITES, UNSPECIFIED CAUSE: ICD-10-CM

## 2023-03-21 DIAGNOSIS — Z72.0 TOBACCO ABUSE: ICD-10-CM

## 2023-03-21 DIAGNOSIS — R20.0 NUMBNESS: ICD-10-CM

## 2023-03-21 DIAGNOSIS — E78.00 HYPERCHOLESTEREMIA: ICD-10-CM

## 2023-03-21 DIAGNOSIS — Z12.11 COLON CANCER SCREENING: ICD-10-CM

## 2023-03-21 NOTE — PATIENT INSTRUCTIONS
Continue same medications  Get bloodwork done when convenient  6 month medcheck or call sooner for problems/concerns  Get cologuard completed before next visit

## 2023-03-21 NOTE — PROGRESS NOTES
[address] [facility]        NAME: Cristi Atkins is a 65 y o  male  : 1960    MRN: [id number]  DATE: 2023  TIME: 9:40 AM    Assessment and Plan   Essential hypertension [I10]  1  Essential hypertension        2  Colon cancer screening  Cologuard      3  Hypercholesteremia        4  Tobacco abuse        5  Chronic gout of multiple sites, unspecified cause        6  Class 1 obesity due to excess calories with serious comorbidity and body mass index (BMI) of 31 0 to 31 9 in adult        7  Numbness              Patient Instructions     Patient Instructions   Continue same medications  Get bloodwork done when convenient  6 month medcheck or call sooner for problems/concerns  Get cologuard completed before next visit            Chief Complaint     Chief Complaint   Patient presents with   • Follow-up         History of Present Illness       Here for 6 month medcheck-  Did not get bloodwork done  He had soup at 3am and iced tea this morning  He reports he does not like water- he only drinks iced tea and soda  Is taking Valsartan and Gabapentin twice daily as directed  Stopped taking Chantix because "it was making me sick"  Review of Systems   Review of Systems   Constitutional: Negative for activity change, diaphoresis, fatigue and fever  HENT: Negative for congestion, facial swelling, hearing loss, rhinorrhea, sinus pressure, sinus pain, sneezing, sore throat and voice change  Eyes: Negative for discharge and visual disturbance  Respiratory: Negative for cough, choking, chest tightness, shortness of breath, wheezing and stridor  Cardiovascular: Negative for chest pain, palpitations and leg swelling  Gastrointestinal: Negative for abdominal distention, abdominal pain, constipation, diarrhea, nausea and vomiting  Endocrine: Negative for polydipsia, polyphagia and polyuria  Genitourinary: Negative for difficulty urinating, dysuria, frequency and urgency  Musculoskeletal: Negative for arthralgias, back pain, gait problem, joint swelling, myalgias, neck pain and neck stiffness  Skin: Negative for color change, rash and wound  Neurological: Negative for dizziness, syncope, speech difficulty, weakness, light-headedness and headaches  Hematological: Negative for adenopathy  Does not bruise/bleed easily  Psychiatric/Behavioral: Negative for agitation, behavioral problems, confusion, hallucinations, sleep disturbance and suicidal ideas  The patient is not nervous/anxious  Current Medications       Current Outpatient Medications:   •  gabapentin (Neurontin) 100 mg capsule, Take 1 capsule (100 mg total) by mouth 2 (two) times a day, Disp: 60 capsule, Rfl: 1  •  hydrOXYzine HCL (ATARAX) 25 mg tablet, Take 1 tablet (25 mg total) by mouth every 6 (six) hours as needed for anxiety, Disp: 90 tablet, Rfl: 1  •  valsartan (DIOVAN) 160 mg tablet, Take 1 tablet (160 mg total) by mouth 2 (two) times a day, Disp: 180 tablet, Rfl: 1    Current Allergies     Allergies as of 03/21/2023   • (No Known Allergies)            The following portions of the patient's history were reviewed and updated as appropriate: allergies, current medications, past family history, past medical history, past social history, past surgical history and problem list      Past Medical History:   Diagnosis Date   • Gout        History reviewed  No pertinent surgical history  Family History   Problem Relation Age of Onset   • Arthritis Mother    • Coronary artery disease Father          Medications have been verified  Objective   /68   Pulse 84   Temp 98 °F (36 7 °C) (Tympanic)   Resp 20   Ht 5' 10" (1 778 m)   Wt 105 kg (232 lb)   SpO2 99%   BMI 33 29 kg/m²        Physical Exam     Physical Exam  Vitals and nursing note reviewed  Constitutional:       General: He is not in acute distress  Appearance: He is well-developed  He is not diaphoretic     Neck:      Thyroid: No thyromegaly  Trachea: No tracheal deviation  Cardiovascular:      Rate and Rhythm: Normal rate and regular rhythm  Heart sounds: Normal heart sounds  No murmur heard  Pulmonary:      Effort: Pulmonary effort is normal  No respiratory distress  Breath sounds: Normal breath sounds  No wheezing  Musculoskeletal:         General: No tenderness or deformity  Normal range of motion  Cervical back: Normal range of motion and neck supple  Skin:     General: Skin is warm and dry  Neurological:      Mental Status: He is alert and oriented to person, place, and time  Psychiatric:         Speech: Speech normal          Behavior: Behavior normal          Thought Content:  Thought content normal          Judgment: Judgment normal

## 2023-03-28 ENCOUNTER — HOSPITAL ENCOUNTER (EMERGENCY)
Facility: HOSPITAL | Age: 63
Discharge: HOME | End: 2023-03-28
Attending: EMERGENCY MEDICINE

## 2023-03-28 ENCOUNTER — APPOINTMENT (EMERGENCY)
Dept: RADIOLOGY | Facility: HOSPITAL | Age: 63
End: 2023-03-28

## 2023-03-28 VITALS
HEIGHT: 70 IN | HEART RATE: 96 BPM | TEMPERATURE: 99 F | RESPIRATION RATE: 16 BRPM | SYSTOLIC BLOOD PRESSURE: 130 MMHG | BODY MASS INDEX: 33.21 KG/M2 | DIASTOLIC BLOOD PRESSURE: 84 MMHG | WEIGHT: 232 LBS | OXYGEN SATURATION: 98 %

## 2023-03-28 DIAGNOSIS — M10.9 GOUT FLARE: Primary | ICD-10-CM

## 2023-03-28 DIAGNOSIS — M25.531 RIGHT WRIST PAIN: ICD-10-CM

## 2023-03-28 RX ORDER — PREDNISONE 20 MG/1
40 TABLET ORAL DAILY
Qty: 10 TABLET | Refills: 0 | Status: SHIPPED | OUTPATIENT
Start: 2023-03-28 | End: 2023-04-02

## 2023-03-28 RX ORDER — INDOMETHACIN 50 MG/1
50 CAPSULE ORAL 2 TIMES DAILY WITH MEALS
Qty: 14 CAPSULE | Refills: 0 | Status: SHIPPED | OUTPATIENT
Start: 2023-03-28 | End: 2023-04-05

## 2023-03-28 RX ORDER — PREDNISONE 20 MG/1
40 TABLET ORAL ONCE
Status: COMPLETED | OUTPATIENT
Start: 2023-03-28 | End: 2023-03-28

## 2023-03-28 RX ORDER — KETOROLAC TROMETHAMINE 30 MG/ML
15 INJECTION, SOLUTION INTRAMUSCULAR; INTRAVENOUS ONCE
Status: COMPLETED | OUTPATIENT
Start: 2023-03-28 | End: 2023-03-28

## 2023-03-28 RX ORDER — OXYCODONE HYDROCHLORIDE 5 MG/1
5 TABLET ORAL ONCE
Status: COMPLETED | OUTPATIENT
Start: 2023-03-28 | End: 2023-03-28

## 2023-03-28 RX ADMIN — PREDNISONE 40 MG: 20 TABLET ORAL at 07:49

## 2023-03-28 RX ADMIN — OXYCODONE HYDROCHLORIDE 5 MG: 5 TABLET ORAL at 07:32

## 2023-03-28 RX ADMIN — KETOROLAC TROMETHAMINE 15 MG: 30 INJECTION, SOLUTION INTRAMUSCULAR; INTRAVENOUS at 07:30

## 2023-03-28 NOTE — DISCHARGE INSTRUCTIONS
It is important that you take your medications with food as taking on an empty stomach can lead to stomach irritation  It is important that you follow-up with your orthopedic surgeon  Return to the ED if symptoms acutely worsen, if you develop fevers, chills, nausea, vomiting or any other concerning symptoms

## 2023-03-28 NOTE — ED PROVIDER NOTES
"History  Chief Complaint   Patient presents with   • Wrist Pain     Right wrist pain, no injury, possible gout  Patient is a 81-year-old male with history of arthritis, gout, who presents for evaluation of right wrist pain/swelling  Patient says that the symptoms started l yesterday ans says that the pain has been getting worse  He denies any significant redness to the wrist   Denies any fevers, chills, nausea, vomiting  Patient says that he has had swelling and pain in his joints like this in the past   He says he is \"had it his knees, ankle, foot  \"  He says that the symptoms are similar to previous attacks that he has had  He says that he was diagnosed with gout in the past   Patient says he took some Tylenol around 3 AM with little relief  Prior to Admission Medications   Prescriptions Last Dose Informant Patient Reported? Taking?   gabapentin (Neurontin) 100 mg capsule   No No   Sig: Take 1 capsule (100 mg total) by mouth 2 (two) times a day   hydrOXYzine HCL (ATARAX) 25 mg tablet   No No   Sig: Take 1 tablet (25 mg total) by mouth every 6 (six) hours as needed for anxiety   valsartan (DIOVAN) 160 mg tablet   No No   Sig: Take 1 tablet (160 mg total) by mouth 2 (two) times a day      Facility-Administered Medications: None       Past Medical History:   Diagnosis Date   • Gout        History reviewed  No pertinent surgical history  Family History   Problem Relation Age of Onset   • Arthritis Mother    • Coronary artery disease Father      I have reviewed and agree with the history as documented      E-Cigarette/Vaping   • E-Cigarette Use Never User      E-Cigarette/Vaping Substances   • Nicotine No    • THC No    • CBD No    • Flavoring No    • Other No    • Unknown No      Social History     Tobacco Use   • Smoking status: Every Day     Packs/day: 3 00     Years: 15 00     Pack years: 45 00     Types: Cigarettes   • Smokeless tobacco: Never   • Tobacco comments:     Flucuates more and less, " Vaping Use   • Vaping Use: Never used   Substance Use Topics   • Alcohol use: Not Currently     Comment: occasionally   • Drug use: No       Review of Systems   Constitutional: Negative for chills, fever and unexpected weight change  HENT: Negative for congestion, sore throat and trouble swallowing  Eyes: Negative for pain, discharge and itching  Respiratory: Negative for cough, chest tightness, shortness of breath and wheezing  Cardiovascular: Negative for chest pain, palpitations and leg swelling  Gastrointestinal: Negative for abdominal pain, blood in stool, diarrhea, nausea and vomiting  Endocrine: Negative for polyuria  Genitourinary: Negative for difficulty urinating, dysuria, frequency and hematuria  Musculoskeletal: Positive for arthralgias (R wrist) and joint swelling (R wrist)  Negative for back pain  Skin: Negative for color change and rash  Neurological: Negative for dizziness, syncope, weakness, light-headedness and headaches  Physical Exam  Physical Exam  Vitals and nursing note reviewed  Constitutional:       General: He is not in acute distress  Appearance: He is well-developed  HENT:      Head: Normocephalic and atraumatic  Right Ear: External ear normal       Left Ear: External ear normal    Eyes:      Conjunctiva/sclera: Conjunctivae normal       Pupils: Pupils are equal, round, and reactive to light  Cardiovascular:      Rate and Rhythm: Normal rate and regular rhythm  Heart sounds: Normal heart sounds  No murmur heard  No friction rub  No gallop  Pulmonary:      Effort: Pulmonary effort is normal  No respiratory distress  Breath sounds: Normal breath sounds  No wheezing or rales  Abdominal:      General: Bowel sounds are normal  There is no distension  Palpations: Abdomen is soft  Tenderness: There is no abdominal tenderness  There is no guarding     Musculoskeletal:         General: Swelling (R wrist) and tenderness (R wrist) present  No deformity  Right wrist: Swelling (mild) and tenderness present  Decreased range of motion  Cervical back: Normal range of motion  Comments: Decreased ROM 2/2 to pain  No significant redness to the wrist  Swelling is dorsal in nature  RUE neurovascularly intact    Lymphadenopathy:      Cervical: No cervical adenopathy  Skin:     General: Skin is warm and dry  Neurological:      General: No focal deficit present  Mental Status: He is alert and oriented to person, place, and time  Mental status is at baseline  Cranial Nerves: No cranial nerve deficit  Sensory: No sensory deficit  Motor: No weakness or abnormal muscle tone  Psychiatric:         Behavior: Behavior normal          Vital Signs  ED Triage Vitals   Temperature Pulse Respirations Blood Pressure SpO2   03/28/23 0636 03/28/23 0634 03/28/23 0634 03/28/23 0634 03/28/23 0634   99 °F (37 2 °C) 96 16 130/84 98 %      Temp Source Heart Rate Source Patient Position - Orthostatic VS BP Location FiO2 (%)   03/28/23 0636 03/28/23 0634 03/28/23 0634 03/28/23 0634 --   Tympanic Monitor Lying Left arm       Pain Score       03/28/23 0634       10 - Worst Possible Pain           Vitals:    03/28/23 0634   BP: 130/84   Pulse: 96   Patient Position - Orthostatic VS: Lying         Visual Acuity      ED Medications  Medications   ketorolac (TORADOL) injection 15 mg (15 mg Intramuscular Given 3/28/23 0730)   oxyCODONE (ROXICODONE) IR tablet 5 mg (5 mg Oral Given 3/28/23 0732)   predniSONE tablet 40 mg (40 mg Oral Given 3/28/23 0749)       Diagnostic Studies  Results Reviewed     None                 XR wrist 3+ views RIGHT   ED Interpretation by Rey Dunne DO (03/28 1473)   No acute osseous abnormality    Small joint effusion                  Procedures  Procedures         ED Course                               SBIRT 22yo+    Flowsheet Row Most Recent Value   SBIRT (25 yo +)    In order to provide better care to our patients, we are screening all of our patients for alcohol and drug use  Would it be okay to ask you these screening questions? Yes Filed at: 03/28/2023 8267   Initial Alcohol Screen: US AUDIT-C     1  How often do you have a drink containing alcohol? 0 Filed at: 03/28/2023 0733   2  How many drinks containing alcohol do you have on a typical day you are drinking? 0 Filed at: 03/28/2023 0733   3a  Male UNDER 65: How often do you have five or more drinks on one occasion? 0 Filed at: 03/28/2023 0733   3b  FEMALE Any Age, or MALE 65+: How often do you have 4 or more drinks on one occassion? 0 Filed at: 03/28/2023 5482   Audit-C Score 0 Filed at: 03/28/2023 4422   PHILIP: How many times in the past year have you    Used an illegal drug or used a prescription medication for non-medical reasons? Never Filed at: 03/28/2023 0123                    Medical Decision Making  46-year-old male with history of gout, arthritis presenting for evaluation of right wrist pain/swelling  Symptoms since yesterday have been progressing  Took Tylenol at 3 AM with little relief  Denies fevers, chills, nausea or vomiting  Has had similar episodes like this in the past in his knee, ankle, foot  Says that this feels similar  On exam, has mild dorsal swelling to the right wrist   No significant redness  Has decreased range of motion secondary to pain  Vitals within normal limits  Believe symptoms are likely secondary to gout given normal vitals, no significant redness, previous history  Have low suspicion for septic joint at this time  Will Obtain x-ray of right wrist to rule out bony abnormality  Will treat with Toradol, will give one-time dose of oxycodone  Offered patient arthrocentesis to rule out underlying infection  He would prefer to start treatment for Gout and follow up with Dr Vicente Owusu as an outpatient/return to the ED if symptoms worsen       Told patient is important that he follows up with his orthopedic doctor for follow-up and reevaluation  Told to return to the ED if the redness/swelling worsens  He develops fevers, chills, n/v, or any other concerning symptoms     Amount and/or Complexity of Data Reviewed  Radiology: ordered  Risk  Prescription drug management  Disposition  Final diagnoses:   Gout flare   Right wrist pain     Time reflects when diagnosis was documented in both MDM as applicable and the Disposition within this note     Time User Action Codes Description Comment    3/28/2023  7:46 AM Dorma Aliment Add [M10 9] Gout flare     3/28/2023  7:46 AM Dorma Aliment Add [M25 531] Right wrist pain       ED Disposition     ED Disposition   Discharge    Condition   Stable    Date/Time   Tue Mar 28, 2023  7:45 AM    Comment   [address] discharge to home/self care                 Follow-up Information     Follow up With Specialties Details Why Contact Info Additional Information    Yayn Pruitt DO Orthopedic Surgery Schedule an appointment as soon as possible for a visit  For follow up of R wrist pain/swelling Nisha Larson [phone]  [address] [facility] Emergency Department Emergency Medicine Go to  If symptoms worsen [address] [place] [id number] [phone] [facility], [address] [facility] [address], [address] [facility]          Discharge Medication List as of 3/28/2023  7:54 AM      START taking these medications    Details   indomethacin (INDOCIN) 50 mg capsule Take 1 capsule (50 mg total) by mouth 2 (two) times a day with meals for 7 days, Starting Tue 3/28/2023, Until Tue 4/4/2023, Normal      predniSONE 20 mg tablet Take 2 tablets (40 mg total) by mouth daily for 5 days, Starting Tue 3/28/2023, Until Sun 4/2/2023, Normal         CONTINUE these medications which have NOT CHANGED    Details   gabapentin (Neurontin) 100 mg capsule Take 1 capsule (100 mg total) by mouth 2 (two) times a day, Starting Fri 1/27/2023, Normal      hydrOXYzine HCL (ATARAX) 25 mg tablet Take 1 tablet (25 mg total) by mouth every 6 (six) hours as needed for anxiety, Starting Fri 2/3/2023, Normal      valsartan (DIOVAN) 160 mg tablet Take 1 tablet (160 mg total) by mouth 2 (two) times a day, Starting Thu 1/12/2023, Normal                 PDMP Review     None          ED Provider  Electronically Signed by           Connie Sims DO  03/28/23 0810

## 2023-03-28 NOTE — Clinical Note
Jaun Avitia was seen and treated in our emergency department on 3/28/2023  Diagnosis: Gout flare    Marianela Dotson  may return to work on return date  He may return on this date: 03/30/2023         If you have any questions or concerns, please don't hesitate to call        Sandra Bunch,     ______________________________           _______________          _______________  Hospital Representative                              Date                                Time

## 2023-03-29 DIAGNOSIS — R20.0 NUMBNESS: ICD-10-CM

## 2023-03-29 NOTE — TELEPHONE ENCOUNTER
Patient requesting refill(s) of: gabapentin 100 mg BID    Last filled: 1/27/2023 #60 x 1  Last appt: 3/21/2023  Next appt: 9/29/2023  Pharmacy: [facility] right lower quadrant/periumbilical/+ttp diffuse lower abdomen and periumbilical region/left lower quadrant

## 2023-03-30 RX ORDER — GABAPENTIN 100 MG/1
100 CAPSULE ORAL 2 TIMES DAILY
Qty: 60 CAPSULE | Refills: 1 | Status: SHIPPED | OUTPATIENT
Start: 2023-03-30

## 2023-03-31 ENCOUNTER — OFFICE VISIT (OUTPATIENT)
Dept: OBGYN CLINIC | Facility: CLINIC | Age: 63
End: 2023-03-31

## 2023-03-31 VITALS
SYSTOLIC BLOOD PRESSURE: 114 MMHG | HEIGHT: 70 IN | BODY MASS INDEX: 34.07 KG/M2 | HEART RATE: 97 BPM | DIASTOLIC BLOOD PRESSURE: 78 MMHG | WEIGHT: 238 LBS

## 2023-03-31 DIAGNOSIS — M25.531 RIGHT WRIST PAIN: ICD-10-CM

## 2023-03-31 DIAGNOSIS — M10.9 GOUT FLARE: ICD-10-CM

## 2023-03-31 NOTE — PROGRESS NOTES
Assessment/Plan:   Diagnoses and all orders for this visit:    Gout flare  -     Ambulatory Referral to Orthopedic Surgery    Right wrist pain  -     Ambulatory Referral to Orthopedic Surgery       Reviewed physical exam with patient on today's visit  His symptoms are consistent with Gout in his right wrist  Discussed appropriate medication management with patient on today's visit  He will follow up, as needed, if his symptoms persist or worsen  The patient expressed understanding and was in agreement with today's treatment plan  Patient had a gouty attack in his right wrist   The pain and swelling is decreased since he has been on formal logical therapy from the emergency room  Continue home exercise program   Continue stretching  Follow-up on an as-needed basis  If his condition changes, he would not hesitate to let us know    Subjective:   Patient ID: Coty Granger  1960     HPI  Patient is a 65 y o  male who presents for an initial evaluation of his right wrist  The patient was treated in the ED on 3/28/23 for gout in his right wrist  He has a longstanding history of gout in multiple joints  The patient states that his diet does not impact his gout flare ups  He denies any numbness and tingling in his hand  Moderate swelling reported  He was prescribed and is taking gabapentin, prednisone, and indomethacin for his symptoms  He was advised to take the prednisone first, once that prescription is complete, he should begin the indomethacin to avoid any stomach complications  The following portions of the patient's history were reviewed and updated as appropriate:  Past medical history, past surgical history, Family history, social history, current medications and allergies    Past Medical History:   Diagnosis Date   • Gout        History reviewed  No pertinent surgical history      Family History   Problem Relation Age of Onset   • Arthritis Mother    • Coronary artery disease Father        Social History     Socioeconomic History   • Marital status: Single     Spouse name: None   • Number of children: None   • Years of education: None   • Highest education level: None   Occupational History   • None   Tobacco Use   • Smoking status: Every Day     Packs/day: 3 00     Years: 15 00     Pack years: 45 00     Types: Cigarettes   • Smokeless tobacco: Never   • Tobacco comments:     Flucuates more and less,   Vaping Use   • Vaping Use: Never used   Substance and Sexual Activity   • Alcohol use: Not Currently     Comment: occasionally   • Drug use: No   • Sexual activity: None   Other Topics Concern   • None   Social History Narrative   • None     Social Determinants of Health     Financial Resource Strain: Not on file   Food Insecurity: Not on file   Transportation Needs: Not on file   Physical Activity: Not on file   Stress: Not on file   Social Connections: Not on file   Intimate Partner Violence: Not on file   Housing Stability: Not on file         Current Outpatient Medications:   •  gabapentin (Neurontin) 100 mg capsule, Take 1 capsule (100 mg total) by mouth 2 (two) times a day, Disp: 60 capsule, Rfl: 1  •  indomethacin (INDOCIN) 50 mg capsule, Take 1 capsule (50 mg total) by mouth 2 (two) times a day with meals for 7 days, Disp: 14 capsule, Rfl: 0  •  predniSONE 20 mg tablet, Take 2 tablets (40 mg total) by mouth daily for 5 days, Disp: 10 tablet, Rfl: 0  •  valsartan (DIOVAN) 160 mg tablet, Take 1 tablet (160 mg total) by mouth 2 (two) times a day, Disp: 180 tablet, Rfl: 1  •  hydrOXYzine HCL (ATARAX) 25 mg tablet, Take 1 tablet (25 mg total) by mouth every 6 (six) hours as needed for anxiety (Patient not taking: Reported on 3/31/2023), Disp: 90 tablet, Rfl: 1    No Known Allergies    Review of Systems   Constitutional: Negative for chills, fever and unexpected weight change  HENT: Negative for hearing loss, nosebleeds and sore throat  Eyes: Negative for pain, redness and visual disturbance  "  Respiratory: Negative for cough, shortness of breath and wheezing  Cardiovascular: Negative for chest pain, palpitations and leg swelling  Gastrointestinal: Negative for abdominal pain, nausea and vomiting  Endocrine: Negative for polydipsia and polyuria  Musculoskeletal: Positive for joint swelling  As noted in HPI   Skin: Negative for rash and wound  Neurological: Negative for dizziness, numbness and headaches  Psychiatric/Behavioral: Negative for decreased concentration and suicidal ideas  The patient is not nervous/anxious  All other systems reviewed and are negative  Objective:  /78 (BP Location: Left arm, Patient Position: Sitting, Cuff Size: Large)   Pulse 97   Ht 5' 10\" (1 778 m)   Wt 108 kg (238 lb)   BMI 34 15 kg/m²     Ortho Exam  right wrist -  Patient presents with no obvious anatomical deformity  Skin is warm and dry to touch with no signs of erythema, ecchymosis, infection  ROM limited due to swelling  TTP over 5th metacarpal  MMT: 5/5 throughout  Moderate soft tissue swelling or effusion noted  Full FDS, FDP, extensor mechanisms are intact  Demonstrates normal elbow and shoulder motion  Forearm compartments are soft and supple  2+ Distal radial pulse with brisk capillary refill to the fingers  Radial, median, ulnar motor and sensory distribution intact  Sensation to light touch intact distally      Physical Exam  Musculoskeletal:         General: Swelling and tenderness present  Diagnostic Test Review:  None performed  Procedures   None performed  Scribe Attestation    I,:  Hermelindo Ortega am acting as a scribe while in the presence of the attending physician :       I,:  Lynette Parrish, DO personally performed the services described in this documentation    as scribed in my presence  :            "

## 2023-04-04 ENCOUNTER — TELEPHONE (OUTPATIENT)
Dept: OBGYN CLINIC | Facility: MEDICAL CENTER | Age: 63
End: 2023-04-04

## 2023-04-04 NOTE — TELEPHONE ENCOUNTER
Called and spoke w/pt and relayed Dr Watters Standing  He said that he feels that his PCP doesn't listen to him  And if she puts him on the long term medication again, he is going to [facility]  Recommended that pt discuss his concerns with his PCP  He said he may end up in ED where they will do something for him  Feels like no one is listening to him  Once again, recommended he discuss his concerns with his PCP

## 2023-04-04 NOTE — TELEPHONE ENCOUNTER
Caller: Patient    Doctor:Nahum    Reason for call:     Patient is calling about pain due to his gout, could the doctor prescribe medication for him  He uses [address] in [place]  Please let him know if something was called in      Call back#: [phone]

## 2023-04-05 ENCOUNTER — OFFICE VISIT (OUTPATIENT)
Dept: URGENT CARE | Facility: CLINIC | Age: 63
End: 2023-04-05

## 2023-04-05 VITALS
WEIGHT: 238 LBS | DIASTOLIC BLOOD PRESSURE: 72 MMHG | SYSTOLIC BLOOD PRESSURE: 134 MMHG | OXYGEN SATURATION: 99 % | HEIGHT: 70 IN | RESPIRATION RATE: 18 BRPM | HEART RATE: 82 BPM | TEMPERATURE: 98.9 F | BODY MASS INDEX: 34.07 KG/M2

## 2023-04-05 DIAGNOSIS — M10.9 ACUTE GOUT OF RIGHT WRIST, UNSPECIFIED CAUSE: Primary | ICD-10-CM

## 2023-04-05 RX ORDER — PREDNISONE 10 MG/1
TABLET ORAL
Qty: 26 TABLET | Refills: 0 | Status: SHIPPED | OUTPATIENT
Start: 2023-04-05

## 2023-04-05 NOTE — PROGRESS NOTES
[address]      NAME: Adan Menendez is a 65 y o  male  : 1960    MRN: [id number]  DATE: 2023  TIME: 11:28 AM    Assessment and Plan   Acute gout of right wrist, unspecified cause [M10 9]  1  Acute gout of right wrist, unspecified cause  predniSONE 10 mg tablet          Patient Instructions   Will start new taper of steroid  Discussed diet changes with patient as well  Follow up with PCP in 3-5 days      Gout   AMBULATORY CARE:   Gout  is a form of arthritis that causes severe joint pain and stiffness  Acute gout pain starts suddenly, gets worse quickly, and stops on its own  Acute gout can become chronic and cause permanent damage to your joints  Seek care immediately if:   • You have severe pain in one or more of your joints that you cannot tolerate      • You have a fever or redness that spreads beyond the joint area      Call your doctor if:   • You have new symptoms, such as a rash, after you start gout treatment      • Your joint pain and swelling do not go away, even after treatment      • You are not urinating as much or as often as you usually do      • You have trouble taking your gout medicines      • You have questions or concerns about your condition or care      Stages of gout:   • Hyperuricemia  starts with high levels of uric acid  Hyperuricemia is not gout, but it increases your risk for gout  You may have no symptoms at this stage, and it usually does not need treatment      • Acute gouty arthritis  starts with a sudden attack of pain and swelling, usually in 1 joint  The attack may last from a few days to 2 weeks      • Intercritical gout  is the time between attacks  You may go months or years without another attack  You will not have joint pain or stiffness, but this does not mean your gout is cured  You will still need treatment to prevent chronic gout      • Chronic tophaceous gout  develops if gout is not treated   Large amounts of uric acid crystals, called tophi, collect around your joints  The crystals can destroy or deform the joints  Gout attacks occur more often, and last hours to weeks  More than 1 joint may be painful and swollen  At this stage, gout symptoms do not go away on their own      Medicines: You may need any of the following:  • Prescription pain medicine  may be given  Ask your healthcare provider how to take this medicine safely  Some prescription pain medicines contain acetaminophen  Do not take other medicines that contain acetaminophen without talking to your healthcare provider  Too much acetaminophen may cause liver damage  Prescription pain medicine may cause constipation  Ask your healthcare provider how to prevent or treat constipation       • NSAIDs , such as ibuprofen, help decrease swelling, pain, and fever  This medicine is available with or without a doctor's order  NSAIDs can cause stomach bleeding or kidney problems in certain people  If you take blood thinner medicine, always ask your healthcare provider if NSAIDs are safe for you  Always read the medicine label and follow directions      • Gout medicine  decreases joint pain and swelling  It may also be given to prevent new gout attacks      • Steroids  reduce inflammation and can help your joint stiffness and pain during gout attacks      • Uric acid medicine  may be given to reduce the amount of uric acid your body makes  Some medicines may help you pass more uric acid when you urinate      • Take your medicine as directed  Contact your healthcare provider if you think your medicine is not helping or if you have side effects  Tell your provider if you are allergic to any medicine  Keep a list of the medicines, vitamins, and herbs you take  Include the amounts, and when and why you take them  Bring the list or the pill bottles to follow-up visits   Carry your medicine list with you in case of an emergency      Manage your symptoms:     •   Rest your painful joint so it can heal   Your healthcare provider may recommend crutches or a walker if the affected joint is in a leg      • Apply ice to your joint  Ice decreases pain and swelling  Use an ice pack, or put crushed ice in a plastic bag  Cover the ice pack or bag with a towel before you apply it to your painful joint  Apply ice for 15 to 20 minutes every hour, or as directed      • Elevate your joint  Elevation helps reduce swelling and pain  Raise your joint above the level of your heart as often as you can  Prop your painful joint on pillows to keep it above your heart comfortably      • Go to physical therapy if directed  A physical therapist can teach you exercises to improve flexibility and range of motion  Help prevent gout attacks:   • Do not eat high-purine foods  These foods include meats, seafood, asparagus, spinach, cauliflower, and some types of beans  Healthcare providers may tell you to eat more low-fat milk products, such as yogurt  Milk products may decrease your risk for gout attacks  Vitamin C and coffee may also help  Your healthcare provider or dietitian can help you create a meal plan      • Drink liquids as directed  Liquids such as water help remove uric acid from your body  Ask how much liquid to drink each day and which liquids are best for you      • Maintain a healthy weight  Weight loss may decrease the amount of uric acid in your body  Ask your healthcare provider what a healthy weight is for you  Ask him or her to help you create a weight loss plan if you are overweight      • Control your blood sugar level if you have diabetes  Keep your blood sugar level in a normal range  This can help prevent gout attacks      • Limit or do not drink alcohol as directed  Alcohol can trigger a gout attack  Alcohol also increases your risk for dehydration  Ask your healthcare provider if alcohol is safe for you      Follow up with your doctor as directed:   You may be referred to a rheumatologist or podiatrist  Write down your questions so you remember to ask them during your visits  © Copyright [employer] 2022 Information is for End User's use only and may not be sold, redistributed or otherwise used for commercial purposes  The above information is an  only  It is not intended as medical advice for individual conditions or treatments  Talk to your doctor, nurse or pharmacist before following any medical regimen to see if it is safe and effective for you           To present to the ER if symptoms worsen  Chief Complaint     Chief Complaint   Patient presents with   • Hand Pain     Patient presents with right hand pain and swelling that started a week ago  Patient completed Prednisone and is currently taking Indocin with no relief  History of Present Illness   Antonia Campos presents to the clinic c/o    Hand Pain   The incident occurred more than 1 week ago (seen at ER 3/28 for acute gout flare, placed on prednisone burst and indomethacin; then say ortho and they recommended d/c inoomethacin and just finishing steroid)  There was no injury mechanism  The pain is present in the right wrist and right hand  The quality of the pain is described as aching  The pain does not radiate  The pain is moderate  Pertinent negatives include no chest pain, numbness or tingling  Nothing aggravates the symptoms  He has tried NSAIDs and acetaminophen (steroid) for the symptoms  The treatment provided moderate (per patient was working well when he took both steroid and nsaid, once stopped nsaid swelling returned) relief  Patient is right handed  Review of Systems   Review of Systems   Constitutional: Negative for chills, diaphoresis, fatigue and fever  HENT: Negative for congestion, ear discharge, ear pain and facial swelling  Eyes: Negative for photophobia, pain, discharge, redness, itching and visual disturbance  Respiratory: Negative for apnea, cough, chest tightness, shortness of breath and wheezing  Cardiovascular: Negative for chest pain and palpitations  Gastrointestinal: Negative for abdominal pain  Musculoskeletal: Positive for arthralgias and joint swelling  Skin: Negative for color change, rash and wound  Neurological: Negative for dizziness, tingling, numbness and headaches  Hematological: Negative for adenopathy  Current Medications     Long-Term Medications   Medication Sig Dispense Refill   • gabapentin (Neurontin) 100 mg capsule Take 1 capsule (100 mg total) by mouth 2 (two) times a day 60 capsule 1   • indomethacin (INDOCIN) 50 mg capsule Take 1 capsule (50 mg total) by mouth 2 (two) times a day with meals for 7 days 14 capsule 0   • valsartan (DIOVAN) 160 mg tablet Take 1 tablet (160 mg total) by mouth 2 (two) times a day 180 tablet 1   • hydrOXYzine HCL (ATARAX) 25 mg tablet Take 1 tablet (25 mg total) by mouth every 6 (six) hours as needed for anxiety (Patient not taking: Reported on 3/31/2023) 90 tablet 1       Current Allergies     Allergies as of 04/05/2023   • (No Known Allergies)            The following portions of the patient's history were reviewed and updated as appropriate: allergies, current medications, past family history, past medical history, past social history, past surgical history and problem list   Past Medical History:   Diagnosis Date   • Gout      History reviewed  No pertinent surgical history    Social History     Socioeconomic History   • Marital status: Single     Spouse name: Not on file   • Number of children: Not on file   • Years of education: Not on file   • Highest education level: Not on file   Occupational History   • Not on file   Tobacco Use   • Smoking status: Every Day     Packs/day: 3 00     Years: 15 00     Pack years: 45 00     Types: Cigarettes   • Smokeless tobacco: Never   • Tobacco comments:     Flucuates more and less,   Vaping Use   • Vaping Use: Never used   Substance and Sexual Activity   • Alcohol use: Not Currently "Comment: occasionally   • Drug use: No   • Sexual activity: Not on file   Other Topics Concern   • Not on file   Social History Narrative   • Not on file     Social Determinants of Health     Financial Resource Strain: Not on file   Food Insecurity: Not on file   Transportation Needs: Not on file   Physical Activity: Not on file   Stress: Not on file   Social Connections: Not on file   Intimate Partner Violence: Not on file   Housing Stability: Not on file       Objective   /72   Pulse 82   Temp 98 9 °F (37 2 °C) (Temporal)   Resp 18   Ht 5' 10\" (1 778 m)   Wt 108 kg (238 lb)   SpO2 99%   BMI 34 15 kg/m²      Physical Exam     Physical Exam  Vitals and nursing note reviewed  Constitutional:       General: He is not in acute distress  Appearance: He is well-developed  He is not diaphoretic  HENT:      Head: Normocephalic and atraumatic  Right Ear: Tympanic membrane and external ear normal       Left Ear: Tympanic membrane and external ear normal       Nose: Nose normal       Mouth/Throat:      Mouth: Mucous membranes are moist       Pharynx: No oropharyngeal exudate or posterior oropharyngeal erythema  Eyes:      General: No scleral icterus  Right eye: No discharge  Left eye: No discharge  Conjunctiva/sclera: Conjunctivae normal    Cardiovascular:      Rate and Rhythm: Normal rate and regular rhythm  Heart sounds: Normal heart sounds  No murmur heard  No friction rub  No gallop  Pulmonary:      Effort: Pulmonary effort is normal  No respiratory distress  Breath sounds: Normal breath sounds  No decreased breath sounds, wheezing, rhonchi or rales  Musculoskeletal:      Right wrist: Swelling and tenderness present  Decreased range of motion (due to swelling)  Normal pulse (radial pulse 2+; nv intact)  Right hand: Swelling (dorsal aspect of hand) and tenderness present  Decreased range of motion (due to swelling)     Skin:     General: Skin is warm and " dry       Coloration: Skin is not pale  Findings: No erythema or rash  Neurological:      Mental Status: He is alert and oriented to person, place, and time  Psychiatric:         Behavior: Behavior normal          Thought Content:  Thought content normal          Judgment: Judgment normal          Mirtha Bucio PA-C